# Patient Record
Sex: MALE | Race: WHITE | Employment: UNEMPLOYED | RURAL
[De-identification: names, ages, dates, MRNs, and addresses within clinical notes are randomized per-mention and may not be internally consistent; named-entity substitution may affect disease eponyms.]

---

## 2017-01-30 DIAGNOSIS — F32.A DEPRESSION DUE TO HEAD INJURY: ICD-10-CM

## 2017-01-30 DIAGNOSIS — S09.90XA DEPRESSION DUE TO HEAD INJURY: ICD-10-CM

## 2017-02-01 RX ORDER — FLUOXETINE 10 MG/1
10 CAPSULE ORAL DAILY
Qty: 30 CAP | Refills: 6 | Status: SHIPPED | OUTPATIENT
Start: 2017-02-01 | End: 2017-08-26 | Stop reason: SDUPTHER

## 2017-02-03 ENCOUNTER — OFFICE VISIT (OUTPATIENT)
Dept: FAMILY MEDICINE CLINIC | Age: 62
End: 2017-02-03

## 2017-02-03 VITALS
HEIGHT: 73 IN | SYSTOLIC BLOOD PRESSURE: 148 MMHG | OXYGEN SATURATION: 96 % | DIASTOLIC BLOOD PRESSURE: 96 MMHG | HEART RATE: 74 BPM | WEIGHT: 208 LBS | BODY MASS INDEX: 27.57 KG/M2 | RESPIRATION RATE: 20 BRPM | TEMPERATURE: 98 F
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
## 2017-02-03 DIAGNOSIS — Z87.820 HISTORY OF CLOSED HEAD INJURY: ICD-10-CM

## 2017-02-03 DIAGNOSIS — Z72.0 TOBACCO ABUSE: ICD-10-CM

## 2017-02-03 DIAGNOSIS — I10 ESSENTIAL HYPERTENSION: ICD-10-CM

## 2017-02-03 DIAGNOSIS — H93.13 TINNITUS, BILATERAL: ICD-10-CM

## 2017-02-03 DIAGNOSIS — T07.XXXA LACERATIONS OF MULTIPLE SITES WITHOUT COMPLICATION: ICD-10-CM

## 2017-02-03 DIAGNOSIS — R42 DIZZINESS: ICD-10-CM

## 2017-02-03 NOTE — PROGRESS NOTES
Progress Note    Patient: Bobbi . MRN: [id number]  SSN: [id number]    YOB: 1955  Age: 63 y.o. Sex: male        Chief Complaint   Patient presents with    Fall    Ringing in Ear         Subjective:   Fall:  Per the patient, he tripped and fell at the grocery store. States that he has hurt his hands from landing on the asphalt. Relates that he did not hit his head and did not lose conscious. States that he got dizzy and then fell. States that he is able to move all of his fingers and does not have any pain in his wrist.  Patient was found lying on the ground outside of [facility]. Syncope  Patient complains of near syncope. Onset was 10 years ago, with unchanged course since that time. Patient describes the episode as never actually lost consciousness, had precursor Sx only, including severe lightheadedness. Patient also has associated symptoms of ringing in his ears. The patient denies visual aura, headache, tachycardia/palpitations, melena, nausea, diarrhea, abdominal pain, excessive thirst. Taking culprit meds?: No suspect medications. Patient has a history of chronic dizziness with a hx of a traumatic head injury in the past.  Patient also has a history of ringing in his ears. Encounter Diagnoses   Name Primary?  Fall (on)(from) sidewalk curb, initial encounter Yes    Tinnitus, bilateral     Essential hypertension     Lacerations of multiple sites without complication     Tobacco abuse     History of closed head injury     Dizziness      Current and past medical information:    Current Medications after this visit[de-identified]     Current Outpatient Prescriptions   Medication Sig    FLUoxetine (PROZAC) 10 mg capsule Take 1 Cap by mouth daily.     carBAMazepine (TEGRETOL) 100 mg chewable tablet CHEW & SWALLOW 1 TABLET BY MOUTH 3 TIMES A DAY    metoprolol tartrate (LOPRESSOR) 25 mg tablet TAKE 1/2 TABLET BY MOUTH TWICE DAILY    pravastatin (PRAVACHOL) 10 mg tablet Take 1 Tab by mouth nightly.  losartan (COZAAR) 100 mg tablet TAKE ONE TABLET BY MOUTH DAILY    amitriptyline (ELAVIL) 25 mg tablet TAKE ONE TABLET BY MOUTH NIGHTLY    aspirin delayed-release 81 mg tablet Take  by mouth daily.  cetirizine (ZYRTEC) 10 mg tablet Take 1 Tab by mouth daily. No current facility-administered medications for this visit. Patient Active Problem List    Diagnosis Date Noted    Skull fracture ([facility] [address] 06/11/2014    Depression due to head injury 06/11/2014    Allergic arthritis involving hand 06/11/2014    Neurogenic pain 04/04/2014    Hearing loss of both ears 09/23/2013    Hyperlipidemia 02/19/2013    Back pain 07/19/2012    Chronic SI joint pain 07/19/2012    Chest pain, unspecified 04/17/2012    Hand pain 02/27/2012    Tobacco abuse 02/27/2012    HTN (hypertension) 02/27/2012    Tinnitus 02/27/2012       Past Medical History   Diagnosis Date    H/O seasonal allergies     Hypertension        No Known Allergies    No past surgical history on file. Social History     Social History    Marital status: SINGLE     Spouse name: N/A    Number of children: N/A    Years of education: N/A     Social History Main Topics    Smoking status: Current Every Day Smoker     Packs/day: 1.00     Types: Cigarettes    Smokeless tobacco: Never Used    Alcohol use No    Drug use: No    Sexual activity: Not Asked     Other Topics Concern    None     Social History Narrative       Review of Systems   Constitutional: Negative. Negative for chills, diaphoresis, fever, malaise/fatigue and weight loss. HENT: Positive for tinnitus (Chronic). Negative for congestion and sore throat. Eyes: Negative. Negative for blurred vision and double vision. Respiratory: Negative. Negative for cough, hemoptysis, sputum production, shortness of breath and wheezing. Cardiovascular: Negative. Negative for chest pain, palpitations, orthopnea, claudication, leg swelling and PND. Gastrointestinal: Negative. Negative for abdominal pain, blood in stool, constipation, diarrhea, heartburn, melena, nausea and vomiting. Genitourinary: Negative. Negative for dysuria, flank pain, frequency, hematuria and urgency. Musculoskeletal: Positive for falls. Negative for back pain, joint pain, myalgias and neck pain. Skin: Negative for itching and rash. Multiple skin tears on the bilateral hands. Neurological: Positive for dizziness (Chronic) and headaches (Chronic). Negative for tingling, tremors, sensory change, speech change, focal weakness, seizures, loss of consciousness and weakness. Endo/Heme/Allergies: Negative. Negative for environmental allergies and polydipsia. Does not bruise/bleed easily. Psychiatric/Behavioral: Negative. Negative for depression, hallucinations, memory loss, substance abuse and suicidal ideas. The patient is not nervous/anxious and does not have insomnia. Objective:     Vitals:    02/03/17 1234   BP: (!) 148/96   Pulse: 74   Resp: 20   Temp: 98 °F (36.7 °C)   TempSrc: Oral   SpO2: 96%   Weight: 208 lb (94.3 kg)   Height: 6' 1\" (1.854 m)      Body mass index is 27.44 kg/(m^2). Physical Exam   Constitutional: He is oriented to person, place, and time and well-developed, well-nourished, and in no distress. No distress. HENT:   Head: Normocephalic and atraumatic. Right Ear: External ear normal.   Left Ear: External ear normal.   Nose: Nose normal.   Mouth/Throat: Oropharynx is clear and moist. No oropharyngeal exudate. Eyes: Conjunctivae and EOM are normal. Pupils are equal, round, and reactive to light. Right eye exhibits no discharge. Left eye exhibits no discharge. No scleral icterus. Neck: Trachea normal, normal range of motion and full passive range of motion without pain. Neck supple. No JVD present. Carotid bruit is not present. No tracheal deviation present. No thyroid mass and no thyromegaly present.    Cardiovascular: Normal rate, regular rhythm, normal heart sounds and intact distal pulses. Exam reveals no gallop and no friction rub. No murmur heard. Pulmonary/Chest: Effort normal and breath sounds normal. No stridor. No respiratory distress. He has no wheezes. He has no rales. He exhibits no tenderness. Abdominal: Soft. Bowel sounds are normal. He exhibits no distension and no mass. There is no tenderness. There is no rebound and no guarding. Musculoskeletal: He exhibits no edema, tenderness or deformity. Right shoulder: Normal.        Left shoulder: Normal.        Right elbow: Normal.       Left elbow: Normal.        Right wrist: Normal.        Left wrist: Normal.        Right hip: Normal.        Left hip: Normal.        Right knee: Normal.        Left knee: Normal.        Right ankle: Normal.        Left ankle: Normal.        Cervical back: Normal.        Thoracic back: Normal.        Lumbar back: Normal.        Right forearm: Normal.        Left forearm: Normal.        Right hand: He exhibits decreased range of motion and laceration. He exhibits normal capillary refill. Normal sensation noted. Decreased sensation is not present in the ulnar distribution, is not present in the medial distribution and is not present in the radial distribution. Normal strength noted. Left hand: He exhibits decreased range of motion and laceration. He exhibits normal capillary refill, no deformity and no swelling. Normal sensation noted. Normal strength noted. Right upper leg: Normal.        Left upper leg: Normal.        Right lower leg: Normal.        Left lower leg: Normal.        Right foot: Normal.        Left foot: Normal.   Patient has multiple lacerations on the bilateral hands. On the palm there is noted to be a slightly deeper lac that dermabond was used to close. All lacerations were cleaned with 1/2 H2O2 and saline. Triple antibiotic ointment applied along with clean dressings.   Patient also needed a tetanus shot and that was given. Information on care and treatment given to the patient for review. Lymphadenopathy:     He has no cervical adenopathy. Neurological: He is alert and oriented to person, place, and time. He has normal reflexes. He displays normal reflexes. No cranial nerve deficit. He exhibits normal muscle tone. Gait normal. Coordination normal. GCS score is 15. Skin: Skin is warm and dry. No rash noted. He is not diaphoretic. No erythema. No pallor. Psychiatric: Memory and judgment normal. His mood appears anxious. He is agitated. He does not exhibit a depressed mood. He expresses no homicidal and no suicidal ideation. He expresses no suicidal plans and no homicidal plans. Patient is a very poor historian. Nursing note and vitals reviewed. Health Maintenance Due   Topic Date Due    Pneumococcal 19-64 Medium Risk (1 of 1 - PPSV23) 09/12/1976    FOBT Q 1 YEAR AGE 50-75  12/10/2015       Assessment and orders:       ICD-10-CM ICD-9-CM    1. Fall (on)(from) sidewalk curb, initial encounter W10. 1XXA E880.1 TETANUS, DIPHTHERIA TOXOIDS AND ACELLULAR PERTUSSIS VACCINE (TDAP), IN INDIVIDS. >=7, IM      CT IMMUNIZ ADMIN,1 SINGLE/COMB VAC/TOXOID   2. Tinnitus, bilateral H93.13 388.30 This is a chronic issue. Patient has had his hearing checked. Has been evaluated in the past. Mandy Joseph. tinnitus in the both ears is related to the hearing loss as documented on the audiogram.  The patient understands that there is no specific medical therapy for tinnitus, and that the best treatment is masking. Masking can be accomplished by environmental background noise such as a fan or radio. There are specific masking devices that can be prescribed and worn by the patient much like a hearing aid. Finally, a hearing aid itself can be quite effective in reducing the tinnitus and improving the associated loss of hearing. Tried to explain to the patient.   Unsure of how much that he is able to understand. 3. Essential hypertension I10 401.9 Self management goals of living with hypertension include:   A. Taking medicine as prescribed,  B. Monitoring blood pressure response to therapy  C. Adopting lifestyle recommendations--increasing exercise, decreasing salt intake, and increasing fruits and vegetable consumption. Our goal is to normalize the blood pressure to decrease the risks of strokes and heart attacks. The patient is in agreement with the plan. Information printed and given to the patient for review. 4. Lacerations of multiple sites without complication F54 110.3    5. Tobacco abuse Z72.0 305.1 Discussion with patient about the global effects of smoking on their health. Is not ready to stop at this visit. Information printed for patient to review. Urged strongly to quit. Verbalized understanding. 6. History of closed head injury Z87.820 V15.52 REFERRAL TO NEUROLOGY   7. Dizziness R42 780.4 AMB POC EKG ROUTINE W/ 12 LEADS, INTER & REP      REFERRAL TO NEUROLOGY      REFERRAL TO CARDIOLOGY         Plan of care:  Discussed diagnoses in detail with patient. Medication risks/benefits/side effects discussed with patient. All of the patient's questions were addressed. The patient understands and agrees with our plan of care. The patient knows to call back if they are unsure of or forget any changes we discussed today or if the symptoms change. The patient received an After-Visit Summary which contains VS, orders, medication list and allergy list. This can be used as a \"mini-medical record\" should they have to seek medical care while out of town.     Patient Care Team:  Holli Mobley MD as PCP - General (Family Practice)  Jules Mullins MD (Rheumatology)  Simona Gann MD (Otolaryngology)  Radha Rivers MD (Cardiology)  Darian Patino MD (Neurology)    Follow-up Disposition: Not on File    Future Appointments  Date Time Provider Vanessa Gutierrez   4/11/2017 9:00 AM Aftab Rainey MD [facility] SCHED       Signed By: Georgette Lozano NP     February 15, 2017

## 2017-02-08 ENCOUNTER — OFFICE VISIT (OUTPATIENT)
Dept: CARDIOLOGY CLINIC | Age: 62
End: 2017-02-08

## 2017-02-08 VITALS
RESPIRATION RATE: 20 BRPM | SYSTOLIC BLOOD PRESSURE: 116 MMHG | OXYGEN SATURATION: 96 % | HEART RATE: 62 BPM | TEMPERATURE: 97.9 F | HEIGHT: 73 IN | BODY MASS INDEX: 27.17 KG/M2 | WEIGHT: 205 LBS | DIASTOLIC BLOOD PRESSURE: 79 MMHG

## 2017-02-08 DIAGNOSIS — S02.91XS CLOSED FRACTURE OF SKULL, UNSPECIFIED BONE, SEQUELA (HCC): ICD-10-CM

## 2017-02-08 DIAGNOSIS — E78.5 HYPERLIPIDEMIA, UNSPECIFIED HYPERLIPIDEMIA TYPE: ICD-10-CM

## 2017-02-08 DIAGNOSIS — R55 SYNCOPE AND COLLAPSE: Primary | ICD-10-CM

## 2017-02-08 DIAGNOSIS — I10 ESSENTIAL HYPERTENSION: ICD-10-CM

## 2017-02-08 NOTE — PROGRESS NOTES
Twin Arora.      1955   Richie Dodd MD  Date of Visit-2/8/2017        [facility],  PCP=Keyon Tello MD     [facility] [address] [place] [address] [facility] [place]. HPI:   Merlin Lineman. is a 63 y.o. male   Subjective:  Mr. Vicenta Clark says that he suffered an injury in some sort of mechanical accident in 28 and since then has had occasional falls and dizziness, getting worse of late. He said he saw a neurologist and had an extensive workup, including a CT scan and was told there was nothing acute found. He says the last episode happened Friday, he fell while walking on a curb, lost his balance and fell. He also had an episode where he fell a month ago behind a building and does not recall. He feels dizzy kind of chronically for many, many years. He has no known cardiac disease. His history includes just essential hypertension and seasonal allergies along with his traumatic injury. He had an echocardiogram done in 2012 which was normal at 60%. He had a stress test 04/18/12 with no ischemia and EF of 65%. He had an EKG on 02/03/17. It was ordered, but I do not see an actual EKG report. Imaging wise he's had CT of the head 09/17/14, which showed no acute abnormality with a healed left temporal bone fracture and focal right temporal lobe encephalomalacia. He does not recall the name of the neurologist that he saw. He generally runs a normal blood pressure. Has kept a blood pressure diary at home, 111-138, with a pulse in the 50s. His blood pressure was elevated at his last visit after the fall, but generally runs pretty well. He was hospitalized in 2012 with chest pain. He has had abnormal troponins and  Metoprolol. Physical Examination:  He has a normal cardiac exam.      Assessment/Plans:  1. Syncope and collapse    2. Essential hypertension    3. Hyperlipidemia, unspecified hyperlipidemia type    4.  Closed fracture of skull, unspecified bone, sequela (HCC)         Impression/Plan:  I am not sure his dizziness relates to any cardiac arrhythmia or change in LV function. The question is whether this relates to his primary neurological injury or perhaps blood pressure. His blood pressure actually runs pretty normal to low. What I would do is cut back or even stop the Cozaar. Told him to cut it in half. I do not know that an echo or stress test is going to be helpful here or even a monitor. Let's have him reduce the Cozaar and see how he does with this. He has follow up as below. We can arrange for the follow up if there is no scheduledfollow up. If he's not improved, however, then pursue workup with echo, etc.  Future Appointments  Date Time Provider Vanessa Gutierrez   4/11/2017 9:00 AM Bren Berman MD [facility]       ROS:Cardiac complete  as above. Respiratory as above with no wheezing or hemoptysis. He denies  symptoms of unusual weight loss , fevers,  BRBPR, hematuria,  or recent stroke    Past Medical History   Diagnosis Date    H/O seasonal allergies     Hypertension       Exam and Labs:  Visit Vitals    /79 (BP 1 Location: Left arm, BP Patient Position: Sitting)    Pulse 62    Temp 97.9 °F (36.6 °C) (Oral)    Resp 20    Ht 6' 1\" (1.854 m)    Wt 205 lb (93 kg)    SpO2 96%    BMI 27.05 kg/m2     Constitutional:  NAD, comfortable , moist mucous membranesHENT: Head: NC,ATEyes: No scleral icterus. Neck:  Neck supple. No JVD present. No tracheal deviation,mass  Chest: Effort normal & normal respiratory excursion     Lungs:breath sounds normal. No stridor. distress, wheezes or  Rales. Heart:normal rate, regular rhythm, normal S1, S2, no murmurs, rubs, clicks or gallops , PMI non displaced. Edema: Edema is none. Extremities:  no clubbing or cyanosis. Abdominal:  no abnormal distension. Neurological: alert, conversant and oriented . Skin: Skin is not cold. No obvious systemic rash noted.  Not diaphoretic. No erythema. Psychiatric:  Grossly normal mood and affect. Behavior appears normal.     Lab Results   Component Value Date/Time    Cholesterol, total 180 08/25/2016 10:20 AM    HDL Cholesterol 56 08/25/2016 10:20 AM    LDL, calculated 102 08/25/2016 10:20 AM    Triglyceride 111 08/25/2016 10:20 AM     Lab Results   Component Value Date/Time    Sodium 140 08/25/2016 10:20 AM    Potassium 5.6 08/25/2016 10:20 AM    Chloride 100 08/25/2016 10:20 AM    CO2 24 08/25/2016 10:20 AM    Anion gap 6 04/18/2012 01:00 AM    Glucose 91 08/25/2016 10:20 AM    BUN 20 08/25/2016 10:20 AM    Creatinine 1.13 08/25/2016 10:20 AM    BUN/Creatinine ratio 18 08/25/2016 10:20 AM    GFR est AA 82 08/25/2016 10:20 AM    GFR est non-AA 71 08/25/2016 10:20 AM    Calcium 9.0 08/25/2016 10:20 AM      Wt Readings from Last 3 Encounters:   02/08/17 205 lb (93 kg)   02/03/17 208 lb (94.3 kg)   11/28/16 208 lb (94.3 kg)    BP Readings from Last 3 Encounters:   02/08/17 116/79   02/03/17 (!) 148/96   11/28/16 139/89        Current Outpatient Prescriptions   Medication Sig    FLUoxetine (PROZAC) 10 mg capsule Take 1 Cap by mouth daily.  carBAMazepine (TEGRETOL) 100 mg chewable tablet CHEW & SWALLOW 1 TABLET BY MOUTH 3 TIMES A DAY    metoprolol tartrate (LOPRESSOR) 25 mg tablet TAKE 1/2 TABLET BY MOUTH TWICE DAILY    pravastatin (PRAVACHOL) 10 mg tablet Take 1 Tab by mouth nightly.  losartan (COZAAR) 100 mg tablet TAKE ONE TABLET BY MOUTH DAILY    aspirin delayed-release 81 mg tablet Take  by mouth daily.  cetirizine (ZYRTEC) 10 mg tablet Take 1 Tab by mouth daily.  amitriptyline (ELAVIL) 25 mg tablet TAKE ONE TABLET BY MOUTH NIGHTLY     No current facility-administered medications for this visit. History reviewed. No pertinent past surgical history. Social Hx=  reports that he has been smoking Cigarettes. He has been smoking about 1.00 pack per day.  He has never used smokeless tobacco. He reports that he does not drink alcohol or use illicit drugs. Family Hx= Family history is unknown by patient. Impression see above.

## 2017-02-08 NOTE — PROGRESS NOTES
Reviewed record in preparation for visit and have necessary documentation  Pt did not bring medication to office visit for review  Information was given to pt on Advanced Directives, Living Will  opportunity was given for questions  Goals that were addressed and/or need to be completed during or after this appointment include   Health Maintenance Due   Topic Date Due    Pneumococcal 19-64 Medium Risk (1 of 1 - PPSV23) 09/12/1976    FOBT Q 1 YEAR AGE 50-75  12/10/2015

## 2017-02-08 NOTE — MR AVS SNAPSHOT
Visit Information Date & Time Provider Department Dept. Phone Encounter #  
 2/8/2017 10:40 AM Giovanni Desai MD [facility] [phone] [id number] Your Appointments 4/11/2017  9:00 AM  
New Patient with Eun Islas MD  
Neurology [address], [id number] [facility]) Appt Note: dizziness [address] [address]  
[phone]  
  
   
 [address] [address] Upcoming Health Maintenance Date Due Pneumococcal 19-64 Medium Risk (1 of 1 - PPSV23) 9/12/1976 FOBT Q 1 YEAR AGE 50-75 12/10/2015 DTaP/Tdap/Td series (2 - Td) 2/3/2027 Allergies as of 2/8/2017  Review Complete On: 2/8/2017 By: Renae Tao LPN No Known Allergies Current Immunizations  Reviewed on 7/19/2012 Name Date Influenza Vaccine 9/6/2013 Influenza Vaccine (Quad) PF 11/28/2016, 11/17/2015, 11/25/2014 Tdap 2/3/2017 Not reviewed this visit Vitals BP Pulse Temp Resp Height(growth percentile) Weight(growth percentile) 116/79 (BP 1 Location: Left arm, BP Patient Position: Sitting) 62 97.9 °F (36.6 °C) (Oral) 20 6' 1\" (1.854 m) 205 lb (93 kg) SpO2 BMI Smoking Status 96% 27.05 kg/m2 Current Every Day Smoker Vitals History BMI and BSA Data Body Mass Index Body Surface Area  
 27.05 kg/m 2 2.19 m 2 Preferred Pharmacy Pharm[facility] Name Phone [address] [address] [phone] Your Updated Medication List  
  
   
This list is accurate as of: 2/8/17 11:50 AM.  Always use your most recent med list.  
  
  
  
  
 amitriptyline 25 mg tablet Commonly known as:  ELAVIL TAKE ONE TABLET BY MOUTH NIGHTLY  
  
 aspirin delayed-release 81 mg tablet Take  by mouth daily. carBAMazepine 100 mg chewable tablet Commonly known as:  TEGretol CHEW & SWALLOW 1 TABLET BY MOUTH 3 TIMES A DAY  
  
 cetirizine 10 mg tablet Commonly known as:  ZYRTEC Take 1 Tab by mouth daily. FLUoxetine 10 mg capsule Commonly known as:  PROzac Take 1 Cap by mouth daily. losartan 100 mg tablet Commonly known as:  COZAAR  
TAKE ONE TABLET BY MOUTH DAILY  
  
 metoprolol tartrate 25 mg tablet Commonly known as:  LOPRESSOR  
TAKE 1/2 TABLET BY MOUTH TWICE DAILY pravastatin 10 mg tablet Commonly known as:  PRAVACHOL Take 1 Tab by mouth nightly. Patient Instructions Reduce your Cozaar to a half tablet daily Please provide this summary of care documentation to your next provider. Your primary care clinician is listed as Orpha Lines. If you have any questions after today's visit, please call [phone].

## 2017-02-15 NOTE — PATIENT INSTRUCTIONS
Scrapes (Abrasions) in Teens: Care Instructions  Your Care Instructions  Scrapes (abrasions) are wounds where your skin has been rubbed or torn off. Most scrapes do not go deep into the skin, but some may remove several layers of skin. Scrapes usually don't bleed much, but they may ooze pinkish fluid. Scrapes on the head or face may appear worse than they are. They may bleed a lot because of the good blood supply to this area. Most scrapes heal well and may not need a bandage. They usually heal within 3 to 7 days. A large, deep scrape may take 1 to 2 weeks or longer to heal. A scab may form on some scrapes. Follow-up care is a key part of your treatment and safety. Be sure to make and go to all appointments, and call your doctor if you are having problems. It's also a good idea to know your test results and keep a list of the medicines you take. How can you care for yourself at home? · If your doctor told you how to care for your wound, follow your doctor's instructions. If you did not get instructions, follow this general advice:  ¨ Wash the scrape with clean water 2 times a day. Don't use hydrogen peroxide or alcohol, which can slow healing. ¨ You may cover the scrape with a thin layer of petroleum jelly, such as Vaseline, and a nonstick bandage. ¨ Apply more petroleum jelly and replace the bandage as needed. · Prop up the injured area on a pillow anytime you sit or lie down during the next 3 days. Try to keep it above the level of your heart. This will help reduce swelling. · Be safe with medicines. Take pain medicines exactly as directed. ¨ If the doctor gave you a prescription medicine for pain, take it as prescribed. ¨ If you are not taking a prescription pain medicine, ask your doctor if you can take an over-the-counter medicine. When should you call for help?   Call your doctor now or seek immediate medical care if:  · You have signs of infection, such as:  ¨ Increased pain, swelling, warmth, or redness around the scrape. ¨ Red streaks leading from the scrape. ¨ Pus draining from the scrape. ¨ A fever. · The scrape starts to bleed, and blood soaks through the bandage. Oozing small amounts of blood is normal.  Watch closely for changes in your health, and be sure to contact your doctor if the scrape is not getting better each day. Where can you learn more? Go to http://jonatan-donna.info/. Enter U944 in the search box to learn more about \"Scrapes (Abrasions) in Teens: Care Instructions. \"  Current as of: May 27, 2016  Content Version: 11.1  © 1914-2550 [employer]. Care instructions adapted under license by [email or web address] (which disclaims liability or warranty for this information). If you have questions about a medical condition or this instruction, always ask your healthcare professional. [place] [address] any warranty or liability for your use of this information. Dizziness: Care Instructions  Your Care Instructions  Dizziness is the feeling of unsteadiness or fuzziness in your head. It is different than having vertigo, which is a feeling that the room is spinning or that you are moving or falling. It is also different from lightheadedness, which is the feeling that you are about to faint. It can be hard to know what causes dizziness. Some people feel dizzy when they have migraine headaches. Sometimes bouts of flu can make you feel dizzy. Some medical conditions, such as heart problems or high blood pressure, can make you feel dizzy. Many medicines can cause dizziness, including medicines for high blood pressure, pain, or anxiety. If a medicine causes your symptoms, your doctor may recommend that you stop or change the medicine. If it is a problem with your heart, you may need medicine to help your heart work better.  If there is no clear reason for your symptoms, your doctor may suggest watching and waiting for a while to see if the dizziness goes away on its own. Follow-up care is a key part of your treatment and safety. Be sure to make and go to all appointments, and call your doctor if you are having problems. It's also a good idea to know your test results and keep a list of the medicines you take. How can you care for yourself at home? · If your doctor recommends or prescribes medicine, take it exactly as directed. Call your doctor if you think you are having a problem with your medicine. · Do not drive while you feel dizzy. · Try to prevent falls. Steps you can take include:  ¨ Using nonskid mats, adding grab bars near the tub, and using night-lights. ¨ Clearing your home so that walkways are free of anything you might trip on. ¨ Letting family and friends know that you have been feeling dizzy. This will help them know how to help you. When should you call for help? Call 911 anytime you think you may need emergency care. For example, call if:  · You passed out (lost consciousness). · You have dizziness along with symptoms of a heart attack. These may include:  ¨ Chest pain or pressure, or a strange feeling in the chest.  ¨ Sweating. ¨ Shortness of breath. ¨ Nausea or vomiting. ¨ Pain, pressure, or a strange feeling in the back, neck, jaw, or upper belly or in one or both shoulders or arms. ¨ Lightheadedness or sudden weakness. ¨ A fast or irregular heartbeat. · You have symptoms of a stroke. These may include:  ¨ Sudden numbness, tingling, weakness, or loss of movement in your face, arm, or leg, especially on only one side of your body. ¨ Sudden vision changes. ¨ Sudden trouble speaking. ¨ Sudden confusion or trouble understanding simple statements. ¨ Sudden problems with walking or balance. ¨ A sudden, severe headache that is different from past headaches. Call your doctor now or seek immediate medical care if:  · You feel dizzy and have a fever, headache, or ringing in your ears.   · You have new or increased nausea and vomiting. · Your dizziness does not go away or comes back. Watch closely for changes in your health, and be sure to contact your doctor if:  · You do not get better as expected. Where can you learn more? Go to http://jonatan-donna.info/. Enter V205 in the search box to learn more about \"Dizziness: Care Instructions. \"  Current as of: May 27, 2016  Content Version: 11.1  © 5101-2751 [employer] Care instructions adapted under license by [employer] (which disclaims liability or warranty for this information). If you have questions about a medical condition or this instruction, always ask your healthcare professional. [address] any warranty or liability for your use of this information. Lightheadedness or Faintness: Care Instructions  Your Care Instructions  Lightheadedness is a feeling that you are about to faint or \"pass out. \" You do not feel as if you or your surroundings are moving. It is different from vertigo, which is the feeling that you or things around you are spinning or tilting. Lightheadedness usually goes away or gets better when you lie down. If lightheadedness gets worse, it can lead to a fainting spell. It is common to feel lightheaded from time to time. Lightheadedness usually is not caused by a serious problem. It often is caused by a short-lasting drop in blood pressure and blood flow to your head that occurs when you get up too quickly from a seated or lying position. Follow-up care is a key part of your treatment and safety. Be sure to make and go to all appointments, and call your doctor if you are having problems. It's also a good idea to know your test results and keep a list of the medicines you take. How can you care for yourself at home? · Lie down for 1 or 2 minutes when you feel lightheaded. After lying down, sit up slowly and remain sitting for 1 to 2 minutes before slowly standing up.   · Avoid movements, positions, or activities that have made you lightheaded in the past.  · Get plenty of rest, especially if you have a cold or flu, which can cause lightheadedness. · Make sure you drink plenty of fluids, especially if you have a fever or have been sweating. · Do not drive or put yourself and others in danger while you feel lightheaded. When should you call for help? Call 911 anytime you think you may need emergency care. For example, call if:  · You have symptoms of a stroke. These may include:  ¨ Sudden numbness, tingling, weakness, or loss of movement in your face, arm, or leg, especially on only one side of your body. ¨ Sudden vision changes. ¨ Sudden trouble speaking. ¨ Sudden confusion or trouble understanding simple statements. ¨ Sudden problems with walking or balance. ¨ A sudden, severe headache that is different from past headaches. · You have symptoms of a heart attack. These may include:  ¨ Chest pain or pressure, or a strange feeling in the chest.  ¨ Sweating. ¨ Shortness of breath. ¨ Nausea or vomiting. ¨ Pain, pressure, or a strange feeling in the back, neck, jaw, or upper belly or in one or both shoulders or arms. ¨ Lightheadedness or sudden weakness. ¨ A fast or irregular heartbeat. After you call 911, the  may tell you to chew 1 adult-strength or 2 to 4 low-dose aspirin. Wait for an ambulance. Do not try to drive yourself. Watch closely for changes in your health, and be sure to contact your doctor if:  · Your lightheadedness gets worse or does not get better with home care. Where can you learn more? Go to http://jonatan-donna.info/. Enter R873 in the search box to learn more about \"Lightheadedness or Faintness: Care Instructions. \"  Current as of: May 27, 2016  Content Version: 11.1  © 2361-3579 [employer].  Care instructions adapted under license by [employer] (which disclaims liability or warranty for this information). If you have questions about a medical condition or this instruction, always ask your healthcare professional. [address] any warranty or liability for your use of this information. Cuts Closed With Adhesives: Care Instructions  Your Care Instructions  A cut can happen anywhere on your body. The doctor used an adhesive to close the cut. When the adhesive dries, it forms a film that holds the edges of the cut together. Skin adhesives are sometimes called liquid stitches. If the cut went deep and through the skin, the doctor may have put in a layer of stitches below the adhesive. The deeper layer of stitches brings the deep part of the cut together. These stitches will dissolve and don't need to be removed. You don't see the stitches, only the adhesive. You may have a bandage. The doctor has checked you carefully, but problems can develop later. If you notice any problems or new symptoms, get medical treatment right away. Follow-up care is a key part of your treatment and safety. Be sure to make and go to all appointments, and call your doctor if you are having problems. It's also a good idea to know your test results and keep a list of the medicines you take. How can you care for yourself at home? · Keep the cut dry for the first 24 to 48 hours. After this, you can shower if your doctor okays it. Pat the cut dry. · Don't soak the cut, such as in a bathtub. Your doctor will tell you when it's safe to get the cut wet. · If your doctor told you how to care for your cut, follow your doctor's instructions. If you did not get instructions, follow this general advice:  ¨ Do not put any kind of ointment, cream, or lotion over the area. This can make the adhesive fall off too soon. ¨ After the first 24 to 48 hours, wash around the cut with clean water 2 times a day. Do not use hydrogen peroxide or alcohol, which can slow healing.   ¨ If the doctor told you to use a bandage, put on a new bandage after cleaning the cut or if the bandage gets wet or dirty. · Prop up the sore area on a pillow anytime you sit or lie down during the next 3 days. Try to keep it above the level of your heart. This will help reduce swelling. · Leave the skin adhesive on your skin until it falls off on its own. This may take 5 to 10 days. · Do not scratch, rub, or pick at the adhesive. · Do not put the sticky part of a bandage directly on the adhesive. · Avoid any activity that could cause your cut to reopen. · Be safe with medicines. Read and follow all instructions on the label. ¨ If the doctor gave you a prescription medicine for pain, take it as prescribed. ¨ If you are not taking a prescription pain medicine, ask your doctor if you can take an over-the-counter medicine. When should you call for help? Call your doctor now or seek immediate medical care if:  · You have new pain, or your pain gets worse. · The skin near the cut is cold or pale or changes color. · You have tingling, weakness, or numbness near the cut. · The cut starts to bleed. · You have trouble moving the area near the cut. · You have symptoms of infection, such as:  ¨ Increased pain, swelling, warmth, or redness around the cut. ¨ Red streaks leading from the cut. ¨ Pus draining from the cut. ¨ A fever. Watch closely for changes in your health, and be sure to contact your doctor if:  · The cut reopens. · You do not get better as expected. Where can you learn more? Go to http://jonatan-donna.info/. Enter P174 in the search box to learn more about \"Cuts Closed With Adhesives: Care Instructions. \"  Current as of: May 27, 2016  Content Version: 11.1  © 5108-6852 [employer]. Care instructions adapted under license by [employer] (which disclaims liability or warranty for this information).  If you have questions about a medical condition or this instruction, always ask your healthcare professional. [address] any warranty or liability for your use of this information. Wound Care: After Your Visit  Your Care Instructions  Taking good care of your wound at home will help it heal quickly and reduce your chance of infection. The doctor has checked you carefully, but problems can develop later. If you notice any problems or new symptoms, get medical treatment right away. Follow-up care is a key part of your treatment and safety. Be sure to make and go to all appointments, and call your doctor if you are having problems. It's also a good idea to know your test results and keep a list of the medicines you take. How can you care for yourself at home? · Clean the area with soap and water 2 times a day unless your doctor gives you different instructions. Don't use hydrogen peroxide or alcohol, which can slow healing. ¨ You may cover the wound with a thin layer of antibiotic ointment, such as bacitracin, and a nonstick bandage. ¨ Apply more ointment and replace the bandage as needed. · Take pain medicines exactly as directed. Some pain is normal with a wound, but do not ignore pain that is getting worse instead of better. You could have an infection. ¨ If the doctor gave you a prescription medicine for pain, take it as prescribed. ¨ If you are not taking a prescription pain medicine, ask your doctor if you can take an over-the-counter medicine. · Your doctor may have closed your wound with stitches (sutures), staples, or skin glue. ¨ If you have stitches, your doctor may remove them after several days to 2 weeks. Or you may have stitches that dissolve on their own. ¨ If you have staples, your doctor may remove them after 7 to 10 days. ¨ If your wound was closed with skin glue, the glue will wear off in a few days to 2 weeks. When should you call for help?   Call your doctor now or seek immediate medical care if:  · You have signs of infection, such as:  ¨ Increased pain, swelling, warmth, or redness near the wound. ¨ Red streaks leading from the wound. ¨ Pus draining from the wound. ¨ A fever. · You bleed so much from your incision that you soak one or more bandages over 2 to 4 hours. Watch closely for changes in your health, and be sure to contact your doctor if:  · The wound is not getting better each day. Where can you learn more? Go to [employer]  Enter M973 in the search box to learn more about \"Wound Care: After Your Visit. \"   © 4100-6247 [employer]. Care instructions adapted under license by [employer] (which disclaims liability or warranty for this information). This care instruction is for use with your licensed healthcare professional. If you have questions about a medical condition or this instruction, always ask your healthcare professional. [address] any warranty or liability for your use of this information. Content Version: 53.5.878356;  Last Revised: April 23, 2012

## 2017-04-10 DIAGNOSIS — R56.9 CONVULSIONS, UNSPECIFIED CONVULSION TYPE (HCC): ICD-10-CM

## 2017-04-11 ENCOUNTER — OFFICE VISIT (OUTPATIENT)
Dept: NEUROLOGY | Age: 62
End: 2017-04-11

## 2017-04-11 VITALS
BODY MASS INDEX: 27.17 KG/M2 | SYSTOLIC BLOOD PRESSURE: 128 MMHG | DIASTOLIC BLOOD PRESSURE: 88 MMHG | WEIGHT: 205 LBS | HEIGHT: 73 IN | HEART RATE: 55 BPM | OXYGEN SATURATION: 99 %

## 2017-04-11 DIAGNOSIS — G40.219 PARTIAL SYMPTOMATIC EPILEPSY WITH COMPLEX PARTIAL SEIZURES, INTRACTABLE, WITHOUT STATUS EPILEPTICUS (HCC): ICD-10-CM

## 2017-04-11 DIAGNOSIS — S06.9X9S TBI (TRAUMATIC BRAIN INJURY), WITH LOC OF UNSPECIFIED DURATION, SEQUELA: Primary | ICD-10-CM

## 2017-04-11 NOTE — MR AVS SNAPSHOT
Visit Information Date & Time Provider Department Dept. Phone Encounter #  
 4/11/2017  9:00 AM Ivan Chong MD Neurology [facility],  [id number] [phone] [id number] Follow-up Instructions Return in about 3 months (around 7/11/2017). Upcoming Health Maintenance Date Due Pneumococcal 19-64 Medium Risk (1 of 1 - PPSV23) 9/12/1976 FOBT Q 1 YEAR AGE 50-75 12/10/2015 DTaP/Tdap/Td series (2 - Td) 2/3/2027 Allergies as of 4/11/2017  Review Complete On: 4/11/2017 By: Ivan Chong MD  
 No Known Allergies Current Immunizations  Reviewed on 7/19/2012 Name Date Influenza Vaccine 9/6/2013 Influenza Vaccine (Quad) PF 11/28/2016, 11/17/2015, 11/25/2014 Tdap 2/3/2017 Not reviewed this visit You Were Diagnosed With   
  
 Codes Comments TBI (traumatic brain injury), with LOC of unspecified duration, sequela    -  Primary ICD-10-CM: Q51.4Q6O 
ICD-9-CM: 907.0 Partial symptomatic epilepsy with complex partial seizures, intractable, without status epilepticus ([facility]     ICD-10-CM: O26.987 ICD-9-CM: 345.41 Vitals BP Pulse Height(growth percentile) Weight(growth percentile) SpO2 BMI  
 128/88 (BP 1 Location: Left arm, BP Patient Position: Sitting) (!) 55 6' 1\" (1.854 m) 205 lb (93 kg) 99% 27.05 kg/m2 Smoking Status Current Every Day Smoker Vitals History BMI and BSA Data Body Mass Index Body Surface Area  
 27.05 kg/m 2 2.19 m 2 Preferred Pharmacy Pharmacy Name Phone [address] [place], [place] - [phone] Your Updated Medication List  
  
   
This list is accurate as of: 4/11/17 10:23 AM.  Always use your most recent med list.  
  
  
  
  
 aspirin delayed-release 81 mg tablet Take  by mouth daily. carBAMazepine 100 mg chewable tablet Commonly known as:  TEGretol CHEW & SWALLOW 1 TABLET BY MOUTH 3 TIMES A DAY  
  
 cetirizine 10 mg tablet Commonly known as:  ZYRTEC Take 1 Tab by mouth daily. FLUoxetine 10 mg capsule Commonly known as:  PROzac Take 1 Cap by mouth daily. losartan 100 mg tablet Commonly known as:  COZAAR  
TAKE ONE TABLET BY MOUTH DAILY  
  
 metoprolol tartrate 25 mg tablet Commonly known as:  LOPRESSOR  
TAKE 1/2 TABLET BY MOUTH TWICE DAILY pravastatin 10 mg tablet Commonly known as:  PRAVACHOL  
TAKE ONE TABLET BY MOUTH NIGHTLY We Performed the Following REFERRAL TO PHYSICAL THERAPY [EDQ55 Custom] Comments:  
 L sided heimplegia; TBI; Gait and balance training; L sided strengthening Follow-up Instructions Return in about 3 months (around 7/11/2017). Referral Information Referral ID Referred By Referred To  
  
 [id number] MJ Schwartz Not Available Visits Status Start Date End Date 1 New Request 4/11/17 4/11/18 If your referral has a status of pending review or denied, additional information will be sent to support the outcome of this decision. Patient Instructions Traumatic Brain Injury, Long-Term Healing: Care Instructions Your Care Instructions A traumatic brain injury (TBI) means the brain has been bruised, swollen, or torn. This can be caused by a blow to the head or body, a fall, or another injury that jars or shakes the brain. It will take time for you to get better. You may worry about how you are feeling. This is normal. TBIs often have long-term effects. These include: · Not thinking clearly, or having trouble remembering new information. · Having headaches, vision problems, or dizziness. · Feeling sad or nervous. · Getting angry easily. · Sleeping more or less than usual. 
No one will be able to tell you for sure how long the symptoms will last. But there are things you can do to help yourself get better.  
You may need another person to watch you closely to make sure that your symptoms aren't getting worse. Follow your doctor's instructions about how long you need someone to stay with you. Follow-up care is a key part of your treatment and safety. Be sure to make and go to all appointments, and call your doctor if you are having problems. It's also a good idea to know your test results and keep a list of the medicines you take. How can you care for yourself at home? What you and your doctors can do Different types of therapy and support may used to help you recover from a TBI. Follow the plan your doctor suggests. This may include: · Physical and occupational therapy. These help you return to daily activities and live as independently as possible. · Speech and language therapy. You may need help understanding and producing language. Speech and language therapists also help you organize daily tasks and develop problem-solving methods. · Counseling. This can help you understand your thoughts and learn ways to cope with your feelings. Counseling can help you feel more in control. It can help get you back to your life's activities. · Social support and support groups. It's important that you get the chance to talk with people who are going through the same things you are. Your family or friends may be able to help you get treatment and deal with your symptoms. · Medicines. These may help relieve symptoms like sleep problems, chronic pain, and headaches. Medicine can also help if you have anxiety, depression, or memory problems. Talk with your doctor about what medicines might be best for you. Also ask which medicines you should not take. What you can do Here are some ways you can help yourself: · Get plenty of sleep, and take it easy during the day. Rest is the best way to recover. · Don't drink alcohol or use illegal drugs. · Don't drive a car, ride a bike, or operate machinery until your doctor says it's okay. · Avoid activities that are physically or mentally demanding. These include housework, exercise, schoolwork, video games, text messaging, or using the computer. You may need to change your school or work schedule for a while. · If you feel grumpy or irritable, get away from whatever is bothering you. When should you call for help? Watch closely for changes in your health, and be sure to contact your doctor if: 
· Your symptoms get worse. These include headaches, trouble concentrating, or changes in your mood. · You have been feeling sad, depressed, or hopeless, or have lost interest in things you usually enjoy. · You do not get better as expected. Where can you learn more? Go to http://jonatan-donna.info/. Enter P943 in the search box to learn more about \"Traumatic Brain Injury, Long-Term Healing: Care Instructions. \" Current as of: October 14, 2016 Content Version: 11.2 © 2839-4793 [employer]aker. Care instructions adapted under license by [employer] (which disclaims liability or warranty for this information). If you have questions about a medical condition or this instruction, always ask your healthcare professional. [place] [address] any warranty or liability for your use of this information. Epilepsy: Care Instructions Your Care Instructions Epilepsy is a common condition that causes repeated seizures. The seizures are caused by bursts of electrical activity in the brain that aren't normal. Seizures may cause problems with muscle control, movement, speech, vision, or awareness. They can be scary. Epilepsy affects each person differently. Some people have only a few seizures. Others get them more often. If you know what triggers a seizure, you may be able to avoid having one. You can take medicines to control and reduce seizures. You and your doctor will need to find the right combination, schedule, and dose of medicine. This may take time and careful changes. Seizures may get worse and happen more often over time. Follow-up care is a key part of your treatment and safety. Be sure to make and go to all appointments, and call your doctor if you are having problems. It's also a good idea to know your test results and keep a list of the medicines you take. How can you care for yourself at home? · Be safe with medicines. Take your medicines exactly as prescribed. Call your doctor if you think you are having a problem with your medicine. · Make a treatment plan with your doctor. Be sure to follow your plan. · Try to identify and avoid things that may make you more likely to have a seizure. These may include: ¨ Not getting enough sleep. ¨ Using drugs or alcohol. ¨ Being emotionally stressed. ¨ Skipping meals. · Keep a record of any seizures you have. Note the date, time of day, and any details about the seizure that you can remember. Your doctor can use this information to plan or adjust your medicine or other treatment. · Be sure that any doctor treating you for another condition knows that you have epilepsy. Each doctor should know what medicines you are taking, if any. · Wear a medical ID bracelet. You can buy this at most [employer]. If you have a seizure that leaves you unconscious or unable to speak for yourself, this bracelet will let those who are treating you know that you have epilepsy. · Talk to your doctor about whether it is safe for you to do certain activities, such as drive or swim. When should you call for help? Call 911 anytime you think you may need emergency care. For example, call if: · A seizure does not stop as it normally does. · You have new symptoms such as: 
¨ Numbness, tingling, or weakness on one side of your body or face. ¨ Vision changes. ¨ Trouble speaking or thinking clearly. Call your doctor now or seek immediate medical care if: 
· You have a fever. · You have a severe headache. Watch closely for changes in your health, and be sure to contact your doctor if: · The normal pattern or features of your seizures change. Where can you learn more? Go to http://jonatan-donan.info/. Bridgett Velasquez in the search box to learn more about \"Epilepsy: Care Instructions. \" Current as of: October 14, 2016 Content Version: 11.2 © 3425-7702 [employer]ing[employer]est. Care instructions adapted under license by [employer] (which disclaims liability or warranty for this information). If you have questions about a medical condition or this instruction, always ask your healthcare professional. [address] any warranty or liability for your use of this information. Please provide this summary of care documentation to your next provider. Your primary care clinician is listed as Ozzie Dumont. If you have any questions after today's visit, please call [phone].

## 2017-04-11 NOTE — LETTER
4/11/2017 12:48 PM 
 
Patient:  Ever Kimball. YOB: 1955 Date of Visit: 4/11/2017 Dear Lydia Junior MD 
[address] [place] [id number] VIA In Basket 
 : Thank you for referring Mr. Vini Decker to me for evaluation/treatment. Below are the relevant portions of my assessment and plan of care. If you have questions, please do not hesitate to call me. I look forward to following Mr. Alphonse Vivar along with you. Sincerely, Tu Culver MD

## 2017-04-11 NOTE — PROGRESS NOTES
NEUROLOGY NEW PATIENT OFFICE CONSULTATION      4/11/2017    RE: Corby Mcnulty.         1955      REFERRED BY:  Tashia Puri MD        CHIEF COMPLAINT:  This is Corby Mcnulty. is a 63 y.o. male right handed on disability for lower back who had concerns including Memory Loss; Fall; and Dizziness. 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 (-) helmet (+) confused. Patient was brought to [facility] and found to have a skull fracture with note of weakness of the L arm. Patient was discharged on unknown seizure med. 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 No witnesses, (-) seat belt (-) alcohol. V suspended his license. Since then, patient will have episodes of \"blacking out\" every 2-3 months, described as confused with blank stares and mumbling (+) tongue bite   (-) incontinence. 3 yrs ago, patient was placed on Tegretol for presumed seizures with reduced events. Baseline dizziness all the time with gait disturbance and falls. Review of Systems   Constitutional: Negative for chills, fever and weight loss. All other systems reviewed and are negative. Past Medical Hx  Past Medical History:   Diagnosis Date    H/O seasonal allergies     Hypertension        Social Hx  Social History     Social History    Marital status: SINGLE     Spouse name: N/A    Number of children: N/A    Years of education: N/A     Social History Main Topics    Smoking status: Current Every Day Smoker     Packs/day: 1.00     Types: Cigarettes    Smokeless tobacco: Never Used    Alcohol use No    Drug use: No    Sexual activity: Not Asked     Other Topics Concern    None     Social History Narrative   quit alcohol 30 yrs ago     Family Hx  Family History   Problem Relation Age of Onset    Family history unknown:  Yes       ALLERGIES  No Known Allergies    CURRENT MED'S  Current Outpatient Prescriptions Medication Sig Dispense Refill    pravastatin (PRAVACHOL) 10 mg tablet TAKE ONE TABLET BY MOUTH NIGHTLY 30 Tab 5    FLUoxetine (PROZAC) 10 mg capsule Take 1 Cap by mouth daily. 30 Cap 6    carBAMazepine (TEGRETOL) 100 mg chewable tablet CHEW & SWALLOW 1 TABLET BY MOUTH 3 TIMES A DAY 90 Tab 3    metoprolol tartrate (LOPRESSOR) 25 mg tablet TAKE 1/2 TABLET BY MOUTH TWICE DAILY 30 Tab 5    losartan (COZAAR) 100 mg tablet TAKE ONE TABLET BY MOUTH DAILY 30 Tab 3    aspirin delayed-release 81 mg tablet Take  by mouth daily.  cetirizine (ZYRTEC) 10 mg tablet Take 1 Tab by mouth daily. 90 Tab 3           PREVIOUS WORKUP: (reviewed)  IMAGING:    EEG: (2013): IMPRESSION: Normal awake and drowsy study. No epileptiform discharges or any other paroxysmal activities or focal abnormalities seen. Clinical correlation is recommended. CT Results (recent):    Results from Hospital Encounter encounter on 09/17/14   CT HEAD W WO CONT   Narrative **Final Report**      ICD Codes / Adm. Diagnosis: 803.00  477.9 / Other closed skull fracture wi    Examination:  CT HEAD W AND WO CON  - [id number] - Sep 17 2014  1:54PM  Accession No:  [id number]  Reason:  sequelae from skull fracture      REPORT:  INDICATION:   sequelae from skull fracture    EXAM:  HEAD CT WITH AND WITHOUT CONTRAST    COMPARISON:  March 22, 2013    TECHNIQUE:  Axial head CT was performed both before and after the uneventful   administration of 100 CC Optiray 320. FINDINGS:    The ventricles are midline without hydrocephalus. There is no acute intra   or extra-axial fluid hemorrhage. There is a focal area of encephalomalacia   in the lateral aspect of the right temporal lobe. The basal cisterns are   patent. The paranasal sinuses are clear. There is no abnormal parenchymal or   meningeal enhancement. No acute skull fracture identified. There is a   corticated lucency in the left temporal bone anterior to the mastoid which   could represent healed fracture. Mastoid air cells and paranasal sinuses are   clear. There is no significant soft tissue swelling. IMPRESSION:    No acute abnormality. Suspect healed left temporal bone fracture and focal   right temporal lobe encephalomalacia. No abnormal enhancement. Signing/Reading Doctor: Ced Rebolledo ([id number])    Approved: Ced Rebolledo ([id number])  Sep 17 2014  2:55PM                                     MRI Results (recent):  No results found for this or any previous visit. IR Results (recent):  No results found for this or any previous visit. VAS/US Results (recent):  No results found for this or any previous visit. LABS (reviewed)  Results for orders placed or performed in visit on 08/25/16   CBC W/O DIFF   Result Value Ref Range    WBC 5.6 3.4 - 10.8 x10E3/uL    RBC 4.84 4.14 - 5.80 x10E6/uL    HGB 15.4 12.6 - 17.7 g/dL    HCT 46.1 37.5 - 51.0 %    MCV 95 79 - 97 fL    MCH 31.8 26.6 - 33.0 pg    MCHC 33.4 31.5 - 35.7 g/dL    RDW 13.2 12.3 - 15.4 %    PLATELET 512 012 - 896 O17L6/ID   METABOLIC PANEL, COMPREHENSIVE   Result Value Ref Range    Glucose 91 65 - 99 mg/dL    BUN 20 6 - 24 mg/dL    Creatinine 1.13 0.76 - 1.27 mg/dL    GFR est non-AA 71 >59 mL/min/1.73    GFR est AA 82 >59 mL/min/1.73    BUN/Creatinine ratio 18 9 - 20    Sodium 140 134 - 144 mmol/L    Potassium 5.6 (H) 3.5 - 5.2 mmol/L    Chloride 100 97 - 108 mmol/L    CO2 24 18 - 29 mmol/L    Calcium 9.0 8.7 - 10.2 mg/dL    Protein, total 6.6 6.0 - 8.5 g/dL    Albumin 4.6 3.5 - 5.5 g/dL    GLOBULIN, TOTAL 2.0 1.5 - 4.5 g/dL    A-G Ratio 2.3 1.1 - 2.5    Bilirubin, total 0.4 0.0 - 1.2 mg/dL    Alk.  phosphatase 105 39 - 117 IU/L    AST (SGOT) 11 0 - 40 IU/L    ALT (SGPT) 13 0 - 44 IU/L   LIPID PANEL   Result Value Ref Range    Cholesterol, total 180 100 - 199 mg/dL    Triglyceride 111 0 - 149 mg/dL    HDL Cholesterol 56 >39 mg/dL    VLDL, calculated 22 5 - 40 mg/dL    LDL, calculated 102 (H) 0 - 99 mg/dL   TSH 3RD GENERATION   Result Value Ref Range    TSH 1.170 0.450 - 4.500 uIU/mL       Physical Exam:     Visit Vitals    /88 (BP 1 Location: Left arm, BP Patient Position: Sitting)    Pulse (!) 55    Ht 6' 1\" (1.854 m)    Wt 93 kg (205 lb)    SpO2 99%    BMI 27.05 kg/m2     General:  Alert, cooperative, no distress. Head:  Normocephalic, without obvious abnormality, atraumatic. Eyes:  Conjunctivae/corneas clear. Lungs:  Heart:   Non labored breathing  Regular rate and rhythm, no carotid bruits   Abdomen:   Soft, non-distended   Extremities: Extremities normal, atraumatic, no cyanosis or edema. Pulses: 2+ and symmetric all extremities. Skin: Skin color, texture, turgor normal. No rashes or lesions. Neurologic Exam     Gen: Attention normal             Language: naming, repetition, fluency normal             Memory: intact recent and remote memory  Cranial Nerves:  I: smell Not tested   II: visual fields Full to confrontation   II: pupils Equal, round, reactive to light   II: optic disc No papilledema   III,VII: ptosis none   III,IV,VI: extraocular muscles  Full ROM   V: mastication normal   V: facial light touch sensation  normal   VII: facial muscle function   Mild L facial droop   VIII: hearing symmetric   IX: soft palate elevation  normal   XI: trapezius strength  5/5   XI: sternocleidomastoid strength 5/5   XI: neck flexion strength  5/5   XII: tongue  midline     Motor: (+) dec L finger and L foot tapping  Sensory: intact to LT, PP, vibration, and JPS  Reflexes: 2+ throughout; Down going toes  Coordination: Good FTN and HTS, Romberg negative  Gait: Drags his L side with spatic gait         Impression:     Fiordaliza Sky. is a 63 y.o. male who  has a past medical history of H/O seasonal allergies and Hypertension. who in [address] [place] [address],[facility], patient was at work running a [employer] and fell 12 feet into a concrete floor. (-) helmet (+) confused.   Patient was brought to [facility] and found to have a skull fracture with note of weakness of the L arm consistent with traumatic brain injury (TBI). CT head in 2014 did show healed left temporal bone fracture and focal   right temporal lobe encephalomalacia. Patient was discharged on unknown seizure meds. 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 Since then, patient will have episodes of \"blacking out\" every 2-3 months, described as confused with blank stares and mumbling concerning for complex partial seizure with secondary generalization. Neurologic exam revealed a spatic L gait which can explain his gait and balance issues. RECOMMENDATIONS  1. I had a long discussion with patient that he had reached his maximum medical improvement from the TBI in 1995. Certainly his current cognitive and gait/balance issue are now his baseline from it. 2. Since patient is doing well on Tegretol with no more seizure episode, patient can continue Tegretol. 3. Will refer to physical therapy for gait and balance training with leg strengthening. Patient will think about it. Mr. Sharmin Sanders has a reminder for a \"due or due soon\" health maintenance. I have asked that he contact his primary care provider for follow-up on this health maintenance. Follow-up Disposition:  Return in about 3 months (around 7/11/2017).         Thank you for the consultation      Divya Almonte MD  Diplomate, American Board of Psychiatry and Neurology  Diplomate, Neuromuscular Medicine  Diplomate, American Board of Electrodiagnostic Medicine    Greater than 50% of time spent counseling patient      CC: Monserrat He MD  Fax: [phone]

## 2017-04-11 NOTE — PATIENT INSTRUCTIONS
Traumatic Brain Injury, Long-Term Healing: Care Instructions  Your Care Instructions    A traumatic brain injury (TBI) means the brain has been bruised, swollen, or torn. This can be caused by a blow to the head or body, a fall, or another injury that jars or shakes the brain. It will take time for you to get better. You may worry about how you are feeling. This is normal. TBIs often have long-term effects. These include:  · Not thinking clearly, or having trouble remembering new information. · Having headaches, vision problems, or dizziness. · Feeling sad or nervous. · Getting angry easily. · Sleeping more or less than usual.  No one will be able to tell you for sure how long the symptoms will last. But there are things you can do to help yourself get better. You may need another person to watch you closely to make sure that your symptoms aren't getting worse. Follow your doctor's instructions about how long you need someone to stay with you. Follow-up care is a key part of your treatment and safety. Be sure to make and go to all appointments, and call your doctor if you are having problems. It's also a good idea to know your test results and keep a list of the medicines you take. How can you care for yourself at home? What you and your doctors can do  Different types of therapy and support may used to help you recover from a TBI. Follow the plan your doctor suggests. This may include:  · Physical and occupational therapy. These help you return to daily activities and live as independently as possible. · Speech and language therapy. You may need help understanding and producing language. Speech and language therapists also help you organize daily tasks and develop problem-solving methods. · Counseling. This can help you understand your thoughts and learn ways to cope with your feelings. Counseling can help you feel more in control. It can help get you back to your life's activities.   · Social support and support groups. It's important that you get the chance to talk with people who are going through the same things you are. Your family or friends may be able to help you get treatment and deal with your symptoms. · Medicines. These may help relieve symptoms like sleep problems, chronic pain, and headaches. Medicine can also help if you have anxiety, depression, or memory problems. Talk with your doctor about what medicines might be best for you. Also ask which medicines you should not take. What you can do  Here are some ways you can help yourself:  · Get plenty of sleep, and take it easy during the day. Rest is the best way to recover. · Don't drink alcohol or use illegal drugs. · Don't drive a car, ride a bike, or operate machinery until your doctor says it's okay. · Avoid activities that are physically or mentally demanding. These include housework, exercise, schoolwork, video games, text messaging, or using the computer. You may need to change your school or work schedule for a while. · If you feel grumpy or irritable, get away from whatever is bothering you. When should you call for help? Watch closely for changes in your health, and be sure to contact your doctor if:  · Your symptoms get worse. These include headaches, trouble concentrating, or changes in your mood. · You have been feeling sad, depressed, or hopeless, or have lost interest in things you usually enjoy. · You do not get better as expected. Where can you learn more? Go to http://jonatan-donna.info/. Enter P943 in the search box to learn more about \"Traumatic Brain Injury, Long-Term Healing: Care Instructions. \"  Current as of: October 14, 2016  Content Version: 11.2  © 9156-8159 [employer]. Care instructions adapted under license by [employer] (which disclaims liability or warranty for this information).  If you have questions about a medical condition or this instruction, always ask your healthcare professional. [place] [address] any warranty or liability for your use of this information. Epilepsy: Care Instructions  Your Care Instructions  Epilepsy is a common condition that causes repeated seizures. The seizures are caused by bursts of electrical activity in the brain that aren't normal. Seizures may cause problems with muscle control, movement, speech, vision, or awareness. They can be scary. Epilepsy affects each person differently. Some people have only a few seizures. Others get them more often. If you know what triggers a seizure, you may be able to avoid having one. You can take medicines to control and reduce seizures. You and your doctor will need to find the right combination, schedule, and dose of medicine. This may take time and careful changes. Seizures may get worse and happen more often over time. Follow-up care is a key part of your treatment and safety. Be sure to make and go to all appointments, and call your doctor if you are having problems. It's also a good idea to know your test results and keep a list of the medicines you take. How can you care for yourself at home? · Be safe with medicines. Take your medicines exactly as prescribed. Call your doctor if you think you are having a problem with your medicine. · Make a treatment plan with your doctor. Be sure to follow your plan. · Try to identify and avoid things that may make you more likely to have a seizure. These may include:  ¨ Not getting enough sleep. ¨ Using drugs or alcohol. ¨ Being emotionally stressed. ¨ Skipping meals. · Keep a record of any seizures you have. Note the date, time of day, and any details about the seizure that you can remember. Your doctor can use this information to plan or adjust your medicine or other treatment. · Be sure that any doctor treating you for another condition knows that you have epilepsy.  Each doctor should know what medicines you are taking, if any. · Wear a medical ID bracelet. You can buy this at most [employer]. If you have a seizure that leaves you unconscious or unable to speak for yourself, this bracelet will let those who are treating you know that you have epilepsy. · Talk to your doctor about whether it is safe for you to do certain activities, such as drive or swim. When should you call for help? Call 911 anytime you think you may need emergency care. For example, call if:  · A seizure does not stop as it normally does. · You have new symptoms such as:  ¨ Numbness, tingling, or weakness on one side of your body or face. ¨ Vision changes. ¨ Trouble speaking or thinking clearly. Call your doctor now or seek immediate medical care if:  · You have a fever. · You have a severe headache. Watch closely for changes in your health, and be sure to contact your doctor if:  · The normal pattern or features of your seizures change. Where can you learn more? Go to http://jonatan-donna.info/. Marco Antonio Ko in the search box to learn more about \"Epilepsy: Care Instructions. \"  Current as of: October 14, 2016  Content Version: 11.2  © 8691-9507 [employer]. Care instructions adapted under license by [employer] (which disclaims liability or warranty for this information). If you have questions about a medical condition or this instruction, always ask your healthcare professional. [place] [address] any warranty or liability for your use of this information.

## 2017-04-13 RX ORDER — CARBAMAZEPINE 100 MG/1
TABLET, CHEWABLE ORAL
Qty: 90 TAB | Refills: 3 | Status: SHIPPED | OUTPATIENT
Start: 2017-04-13 | End: 2017-08-15 | Stop reason: SDUPTHER

## 2017-07-11 ENCOUNTER — OFFICE VISIT (OUTPATIENT)
Dept: NEUROLOGY | Age: 62
End: 2017-07-11

## 2017-07-11 VITALS
SYSTOLIC BLOOD PRESSURE: 141 MMHG | HEART RATE: 66 BPM | RESPIRATION RATE: 20 BRPM | OXYGEN SATURATION: 96 % | WEIGHT: 209.8 LBS | TEMPERATURE: 98.9 F | BODY MASS INDEX: 27.8 KG/M2 | DIASTOLIC BLOOD PRESSURE: 95 MMHG | HEIGHT: 73 IN

## 2017-07-11 DIAGNOSIS — R56.9 SEIZURES (HCC): ICD-10-CM

## 2017-07-11 DIAGNOSIS — S06.9X9S TBI (TRAUMATIC BRAIN INJURY), WITH LOC OF UNSPECIFIED DURATION, SEQUELA: Primary | ICD-10-CM

## 2017-07-11 NOTE — PROGRESS NOTES
Neurology Consult      Subjective:      Yamilka Serrato is a 63 y.o. male who returns in revisit since seeing my colleague 3 months ago. Has previous documentation of remote traumatic brain injury 1995 with care at [facility]. Discharged home on seizure medicine and later would have loss of consciousness spells and placed on Tegretol and had better outcomes? Currently on 100 mg 3 times daily of the chewable and manages brief blank stares faulty memory episodes once in a while. Was described as having baseline dizziness and gait dysfunction occasional falls. Physical therapy option exercised on last visit but said he could not afford this. Today mentions his left hand and a finger injury in the context of his remote injury. What I see is a Deputyren's contracture of the left hand and could not positively link that with his remote 1995 injury or care. Complains about his memory speed of information processing that goes back to his remote injury. I offered him formal neuro-psychological testing but cannot get transportation to the test site. If this changes I would be more than glad to get it accomplished. 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 Could intervene with a neuralgic agent but is already on Tegretol and not committal to any medication changes today. Current Outpatient Prescriptions   Medication Sig Dispense Refill    metoprolol tartrate (LOPRESSOR) 25 mg tablet TAKE 1/2 TABLET BY MOUTH TWICE DAILY 30 Tab 5    carBAMazepine (TEGRETOL) 100 mg chewable tablet CHEW & SWALLOW 1 TABLET BY MOUTH 3 TIMES A DAY 90 Tab 3    pravastatin (PRAVACHOL) 10 mg tablet TAKE ONE TABLET BY MOUTH NIGHTLY 30 Tab 5    FLUoxetine (PROZAC) 10 mg capsule Take 1 Cap by mouth daily.  30 Cap 6    losartan (COZAAR) 100 mg tablet TAKE ONE TABLET BY MOUTH DAILY 30 Tab 3    aspirin delayed-release 81 mg tablet Take  by mouth daily.      cetirizine (ZYRTEC) 10 mg tablet Take 1 Tab by mouth daily. 80 Tab 3      No Known Allergies  Past Medical History:   Diagnosis Date    H/O seasonal allergies     Hypertension       No past surgical history on file. Social History     Social History    Marital status: SINGLE     Spouse name: N/A    Number of children: N/A    Years of education: N/A     Occupational History    Not on file. Social History Main Topics    Smoking status: Current Every Day Smoker     Packs/day: 1.00     Types: Cigarettes    Smokeless tobacco: Never Used    Alcohol use No    Drug use: No    Sexual activity: Not on file     Other Topics Concern    Not on file     Social History Narrative      Family History   Problem Relation Age of Onset    Family history unknown: Yes      Visit Vitals    BP (!) 141/95    Pulse 66    Temp 98.9 °F (37.2 °C) (Oral)    Resp 20    Ht 6' 1\" (1.854 m)    Wt 209 lb 12.8 oz (95.2 kg)    SpO2 96%    BMI 27.68 kg/m2        Review of Systems:   A comprehensive review of systems was negative except for that written in the HPI. Neuro Exam:     Appearance: The patient is well developed, well nourished, provides a coherent history and is in no acute distress. Mental Status: Oriented to time, place and person. Mood and affect appropriate. Cranial Nerves:   Intact visual fields. Fundi are benign. SANTIAGO, EOM's full, no nystagmus, no ptosis. Facial sensation is normal. Corneal reflexes are intact. Facial movement is symmetric. Hearing is normal bilaterally. Palate is midline with normal sternocleidomastoid and trapezius muscles are normal. Tongue is midline. Motor:  5/5 strength in upper and lower proximal and distal muscles of the right. On the left has left upper extremity strength 4 plus to 5- with a left fracture. In the left leg he is 4 to 4+. normal bulk and tone. No fasciculations. Reflexes:   Deep tendon reflexes 2+/4 and symmetrical except trace ankle jerks. Sensory:   Normal to touch, pinprick and vibration. Gait:   Has a left spastic hemiparetic gait    Tremor:   No tremor noted. Cerebellar:  No cerebellar signs present. Neurovascular:  Normal heart sounds and regular rhythm, peripheral pulses intact, and no carotid bruits. Assessment:   History of remote traumatic brain injury 1995 previously stipulated. Previously offered physical therapy cannot afford it. Has issues with permission processing and memory and offered psychological testing but cannot get transportation to the [facility] office. If this gets worked out would be more than glad implement these suggestions. Does appear to have a left Dupuytren's contracture which I cannot absolutely link to his TBI. Has point tenderness over the scalp and what appears to be a previous sharon hole and could represent scalp sensitivity. Plan:   Revisit 6 months.   Signed by :  Hugo Lucio MD

## 2017-07-11 NOTE — PATIENT INSTRUCTIONS
[facility]   Statement to Patients  April 1, 2014      In an effort to ensure the large volume of patient prescription refills is processed in the most efficient and expeditious manner, we are asking our patients to assist us by calling your Pharmacy for all prescription refills, this will include also your  Mail Order Pharmacy. The pharmacy will contact our office electronically to continue the refill process. Please do not wait until the last minute to call your pharmacy. We need at least 48 hours (2days) to fill prescriptions. We also encourage you to call your pharmacy before going to  your prescription to make sure it is ready. With regard to controlled substance prescription refill requests (narcotic refills) that need to be picked up at our office, we ask your cooperation by providing us with at least 72 hours (3days) notice that you will need a refill. We will not refill narcotic prescription refill requests after 4:00pm on any weekday, Monday through Thursday, or after 2:00pm on Fridays, or on the weekends. We encourage everyone to explore another way of getting your prescription refill request processed using [employer], our patient web portal through our electronic medical record system. [employer] is an efficient and effective way to communicate your medication request directly to the office and  downloadable as an pam on your smart phone . [employer] also features a review functionality that allows you to view your medication list as well as leave messages for your physician. Are you ready to get connected? If so please review the attatched instructions or speak to any of our staff to get you set up right away! Thank you so much for your cooperation. Should you have any questions please contact our Practice Administrator.     The Physicians and Staff,  [facility] [address]  What is a living will?  A living will is a legal form you use to write down the kind of care you want at the end of your life. It is used by the health professionals who will treat you if you aren't able to decide for yourself. If you put your wishes in writing, your loved ones and others will know what kind of care you want. They won't need to guess. This can ease your mind and be helpful to others. A living will is not the same as an estate or property will. An estate will explains what you want to happen with your money and property after you die. Is a living will a legal document? A living will is a legal document. Each state has its own laws about living oleary. If you move to another state, make sure that your living will is legal in the state where you now live. Or you might use a universal form that has been approved by many states. This kind of form can sometimes be completed and stored online. Your electronic copy will then be available wherever you have a connection to the Internet. In most cases, doctors will respect your wishes even if you have a form from a different state. · You don't need an  to complete a living will. But legal advice can be helpful if your state's laws are unclear, your health history is complicated, or your family can't agree on what should be in your living will. · You can change your living will at any time. Some people find that their wishes about end-of-life care change as their health changes. · In addition to making a living will, think about completing a medical power of  form. This form lets you name the person you want to make end-of-life treatment decisions for you (your \"health care agent\") if you're not able to. Many hospitals and nursing homes will give you the forms you need to complete a living will and a medical power of . · Your living will is used only if you can't make or communicate decisions for yourself anymore.  If you become able to make decisions again, you can accept or refuse any treatment, no matter what you wrote in your living will. · Your state may offer an online registry. This is a place where you can store your living will online so the doctors and nurses who need to treat you can find it right away. What should you think about when creating a living will? Talk about your end-of-life wishes with your family members and your doctor. Let them know what you want. That way the people making decisions for you won't be surprised by your choices. Think about these questions as you make your living will:  · Do you know enough about life support methods that might be used? If not, talk to your doctor so you know what might be done if you can't breathe on your own, your heart stops, or you're unable to swallow. · What things would you still want to be able to do after you receive life-support methods? Would you want to be able to walk? To speak? To eat on your own? To live without the help of machines? · If you have a choice, where do you want to be cared for? In your home? At a hospital or nursing home? · Do you want certain Scientology practices performed if you become very ill? · If you have a choice at the end of your life, where would you prefer to die? At home? In a hospital or nursing home? Somewhere else? · Would you prefer to be buried or cremated? · Do you want your organs to be donated after you die? What should you do with your living will? · Make sure that your family members and your health care agent have copies of your living will. · Give your doctor a copy of your living will to keep in your medical record. If you have more than one doctor, make sure that each one has a copy. · You may want to put a copy of your living will where it can be easily found. Where can you learn more? Go to http://jonatan-donna.info/. Enter D841 in the search box to learn more about \"Learning About Living Perdelio. \"  Current as of: August 8, 2016  Content Version: 11.3  © 7203-0180 [employer]. Care instructions adapted under license by [employer] (which disclaims liability or warranty for this information). If you have questions about a medical condition or this instruction, always ask your healthcare professional. [address] any warranty or liability for your use of this information. Could suggest that rehab through physical therapy and formal memory testing might help advanced case here . I cannot think of anything else to advance his care at this time. Continue carbamazepine as previously ordered. And revisit in 6 months.

## 2017-07-11 NOTE — MR AVS SNAPSHOT
Visit Information Date & Time Provider Department Dept. Phone Encounter #  
 7/11/2017  9:00 AM Farheen Han MD Neurology [facility], [id number] [phone] [id number] Follow-up Instructions Return in about 6 months (around 1/11/2018). Your Appointments 12/12/2017  9:00 AM  
Follow Up with Shwetha Hugo MD  
Neurology [address], [id number]) Appt Note: follow up TBI/head pain  $0CP  noel  7/11/17 [address] [address] [phone]  
  
   
 [address] [address] Upcoming Health Maintenance Date Due Pneumococcal 19-64 Medium Risk (1 of 1 - PPSV23) 9/12/1976 FOBT Q 1 YEAR AGE 50-75 12/10/2015 INFLUENZA AGE 9 TO ADULT 8/1/2017 DTaP/Tdap/Td series (2 - Td) 2/3/2027 Allergies as of 7/11/2017  Review Complete On: 7/11/2017 By: Farheen Han MD  
 No Known Allergies Current Immunizations  Reviewed on 7/19/2012 Name Date Influenza Vaccine 9/6/2013 Influenza Vaccine (Quad) PF 11/28/2016, 11/17/2015, 11/25/2014 Tdap 2/3/2017 Not reviewed this visit You Were Diagnosed With   
  
 Codes Comments TBI (traumatic brain injury), with LOC of unspecified duration, sequela    -  Primary ICD-10-CM: H98.1I3A 
ICD-9-CM: 907.0 Seizures ([facility] [address]     ICD-10-CM: R56.9 ICD-9-CM: 780.39 Vitals BP Pulse Temp Resp Height(growth percentile) Weight(growth percentile) (!) 141/95 66 98.9 °F (37.2 °C) (Oral) 20 6' 1\" (1.854 m) 209 lb 12.8 oz (95.2 kg) SpO2 BMI Smoking Status 96% 27.68 kg/m2 Current Every Day Smoker Vitals History BMI and BSA Data Body Mass Index Body Surface Area  
 27.68 kg/m 2 2.21 m 2 Preferred Pharmacy Pharmacy Name Phone [address] [place], [place] - [address] [facility] [phone] Your Updated Medication List  
  
   
 This list is accurate as of: 7/11/17  9:28 AM.  Always use your most recent med list.  
  
  
  
  
 aspirin delayed-release 81 mg tablet Take  by mouth daily. carBAMazepine 100 mg chewable tablet Commonly known as:  TEGretol CHEW & SWALLOW 1 TABLET BY MOUTH 3 TIMES A DAY  
  
 cetirizine 10 mg tablet Commonly known as:  ZYRTEC Take 1 Tab by mouth daily. FLUoxetine 10 mg capsule Commonly known as:  PROzac Take 1 Cap by mouth daily. losartan 100 mg tablet Commonly known as:  COZAAR  
TAKE ONE TABLET BY MOUTH DAILY  
  
 metoprolol tartrate 25 mg tablet Commonly known as:  LOPRESSOR  
TAKE 1/2 TABLET BY MOUTH TWICE DAILY pravastatin 10 mg tablet Commonly known as:  PRAVACHOL  
TAKE ONE TABLET BY MOUTH NIGHTLY Follow-up Instructions Return in about 6 months (around 1/11/2018). Patient Instructions PRESCRIPTION REFILL POLICY [facility] Statement to Patients April 1, 2014 In an effort to ensure the large volume of patient prescription refills is processed in the most efficient and expeditious manner, we are asking our patients to assist us by calling your Pharmacy for all prescription refills, this will include also your  Mail Order Pharmacy. The pharmacy will contact our office electronically to continue the refill process. Please do not wait until the last minute to call your pharmacy. We need at least 48 hours (2days) to fill prescriptions. We also encourage you to call your pharmacy before going to  your prescription to make sure it is ready. With regard to controlled substance prescription refill requests (narcotic refills) that need to be picked up at our office, we ask your cooperation by providing us with at least 72 hours (3days) notice that you will need a refill.  
 
We will not refill narcotic prescription refill requests after 4:00pm on any weekday, Monday through Thursday, or after 2:00pm on Fridays, or on the weekends. We encourage everyone to explore another way of getting your prescription refill request processed using [employer], our patient web portal through our electronic medical record system. [employer] is an efficient and effective way to communicate your medication request directly to the office and  downloadable as an pam on your smart phone . [employer] also features a review functionality that allows you to view your medication list as well as leave messages for your physician. Are you ready to get connected? If so please review the attatched instructions or speak to any of our staff to get you set up right away! Thank you so much for your cooperation. Should you have any questions please contact our Practice Administrator. The Physicians and Staff,  [facility] Tigre Castillo [address] What is a living will? A living will is a legal form you use to write down the kind of care you want at the end of your life. It is used by the health professionals who will treat you if you aren't able to decide for yourself. If you put your wishes in writing, your loved ones and others will know what kind of care you want. They won't need to guess. This can ease your mind and be helpful to others. A living will is not the same as an estate or property will. An estate will explains what you want to happen with your money and property after you die. Is a living will a legal document? A living will is a legal document. Each state has its own laws about living oleayr. If you move to another state, make sure that your living will is legal in the state where you now live. Or you might use a universal form that has been approved by many states. This kind of form can sometimes be completed and stored online. Your electronic copy will then be available wherever you have a connection to the Internet.  In most cases, doctors will respect your wishes even if you have a form from a different state. · You don't need an  to complete a living will. But legal advice can be helpful if your state's laws are unclear, your health history is complicated, or your family can't agree on what should be in your living will. · You can change your living will at any time. Some people find that their wishes about end-of-life care change as their health changes. · In addition to making a living will, think about completing a medical power of  form. This form lets you name the person you want to make end-of-life treatment decisions for you (your \"health care agent\") if you're not able to. Many hospitals and nursing homes will give you the forms you need to complete a living will and a medical power of . · Your living will is used only if you can't make or communicate decisions for yourself anymore. If you become able to make decisions again, you can accept or refuse any treatment, no matter what you wrote in your living will. · Your state may offer an online registry. This is a place where you can store your living will online so the doctors and nurses who need to treat you can find it right away. What should you think about when creating a living will? Talk about your end-of-life wishes with your family members and your doctor. Let them know what you want. That way the people making decisions for you won't be surprised by your choices. Think about these questions as you make your living will: · Do you know enough about life support methods that might be used? If not, talk to your doctor so you know what might be done if you can't breathe on your own, your heart stops, or you're unable to swallow. · What things would you still want to be able to do after you receive life-support methods? Would you want to be able to walk? To speak? To eat on your own? To live without the help of machines? · If you have a choice, where do you want to be cared for? In your home? At a hospital or nursing home? · Do you want certain Adventism practices performed if you become very ill? · If you have a choice at the end of your life, where would you prefer to die? At home? In a hospital or nursing home? Somewhere else? · Would you prefer to be buried or cremated? · Do you want your organs to be donated after you die? What should you do with your living will? · Make sure that your family members and your health care agent have copies of your living will. · Give your doctor a copy of your living will to keep in your medical record. If you have more than one doctor, make sure that each one has a copy. · You may want to put a copy of your living will where it can be easily found. Where can you learn more? Go to http://jonatan-donna.info/. Enter R340 in the search box to learn more about \"Learning About Living Callie. \" Current as of: August 8, 2016 Content Version: 11.3 © 4737-7425 [employer]. Care instructions adapted under license by [employer] (which disclaims liability or warranty for this information). If you have questions about a medical condition or this instruction, always ask your healthcare professional. [address] any warranty or liability for your use of this information. Could suggest that rehab through physical therapy and formal memory testing might help advanced case here . I cannot think of anything else to advance his care at this time. Continue carbamazepine as previously ordered. And revisit in 6 months. Please provide this summary of care documentation to your next provider. Your primary care clinician is listed as Paty Sapp. If you have any questions after today's visit, please call [phone].

## 2017-07-26 ENCOUNTER — OFFICE VISIT (OUTPATIENT)
Dept: FAMILY MEDICINE CLINIC | Age: 62
End: 2017-07-26

## 2017-07-26 VITALS
RESPIRATION RATE: 20 BRPM | SYSTOLIC BLOOD PRESSURE: 130 MMHG | HEART RATE: 69 BPM | TEMPERATURE: 97.5 F | OXYGEN SATURATION: 96 % | DIASTOLIC BLOOD PRESSURE: 79 MMHG

## 2017-07-26 DIAGNOSIS — S46.912A SHOULDER STRAIN, LEFT, INITIAL ENCOUNTER: ICD-10-CM

## 2017-07-26 DIAGNOSIS — S40.812A ABRASION OF LEFT ARM, INITIAL ENCOUNTER: Primary | ICD-10-CM
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
## 2017-07-26 RX ORDER — MUPIROCIN 20 MG/G
OINTMENT TOPICAL DAILY
Qty: 22 G | Refills: 0 | Status: SHIPPED | OUTPATIENT
Start: 2017-07-26 | End: 2018-02-26

## 2017-07-26 RX ORDER — DICLOFENAC SODIUM 75 MG/1
75 TABLET, DELAYED RELEASE ORAL 2 TIMES DAILY
Qty: 30 TAB | Refills: 0 | Status: SHIPPED | OUTPATIENT
Start: 2017-07-26 | End: 2018-02-26

## 2017-07-26 NOTE — PROGRESS NOTES
Reviewed record in preparation for visit and have necessary documentation  Pt did not bring medication to office visit for review  Information was given to pt on Advanced Directives, Living Will  Information was given on Shingles Vaccine  Opportunity was given for questions  Goals that were addressed and/or need to be completed after this appointment include   Health Maintenance Due   Topic Date Due    Pneumococcal 19-64 Medium Risk (1 of 1 - PPSV23) 09/12/1976    FOBT Q 1 YEAR AGE 50-75  12/10/2015    ZOSTER VACCINE AGE 60>  07/12/2017

## 2017-07-26 NOTE — PATIENT INSTRUCTIONS

## 2017-07-26 NOTE — MR AVS SNAPSHOT
Visit Information Date & Time Provider Department Dept. Phone Encounter #  
 7/26/2017  2:40 PM Paty Sapp MD [address] [place] [address] [id number] Your Appointments 12/12/2017  9:00 AM  
Follow Up with Shanice Castillo MD  
Neurology [address], [id number] [place] [address]) Appt Note: follow up TBI/head pain  $0CP  noel  7/11/17 [address] [address]  
[phone]  
  
   
 [address] [address] Upcoming Health Maintenance Date Due Pneumococcal 19-64 Medium Risk (1 of 1 - PPSV23) 9/12/1976 FOBT Q 1 YEAR AGE 50-75 12/10/2015 ZOSTER VACCINE AGE 60> 7/12/2017 INFLUENZA AGE 9 TO ADULT 8/1/2017 DTaP/Tdap/Td series (2 - Td) 2/3/2027 Allergies as of 7/26/2017  Review Complete On: 7/26/2017 By: Deb Murphy LPN No Known Allergies Current Immunizations  Reviewed on 7/19/2012 Name Date Influenza Vaccine 9/6/2013 Influenza Vaccine (Quad) PF 11/28/2016, 11/17/2015, 11/25/2014 Tdap 2/3/2017 Not reviewed this visit You Were Diagnosed With   
  
 Codes Comments Abrasion of left arm, initial encounter    -  Primary ICD-10-CM: Z80.231L ICD-9-CM: 913.0 Shoulder strain, left, initial encounter     ICD-10-CM: L99.594R ICD-9-CM: 840.9 Fall, initial encounter     ICD-10-CM: W19. Chun Barragan ICD-9-CM: E888.9 Vitals BP Pulse Temp Resp SpO2 Smoking Status 130/79 69 97.5 °F (36.4 °C) (Oral) 20 96% Current Every Day Smoker Preferred Pharmacy Pharmacy Name Phone [address] [address] [facility] [phone] Your Updated Medication List  
  
   
This list is accurate as of: 7/26/17  2:47 PM.  Always use your most recent med list.  
  
  
  
  
 aspirin delayed-release 81 mg tablet Take  by mouth daily. carBAMazepine 100 mg chewable tablet Commonly known as:  TEGretol CHEW & SWALLOW 1 TABLET BY MOUTH 3 TIMES A DAY  
  
 cetirizine 10 mg tablet Commonly known as:  ZYRTEC Take 1 Tab by mouth daily. diclofenac EC 75 mg EC tablet Commonly known as:  VOLTAREN Take 1 Tab by mouth two (2) times a day. FLUoxetine 10 mg capsule Commonly known as:  PROzac Take 1 Cap by mouth daily. losartan 100 mg tablet Commonly known as:  COZAAR  
TAKE ONE TABLET BY MOUTH DAILY  
  
 metoprolol tartrate 25 mg tablet Commonly known as:  LOPRESSOR  
TAKE 1/2 TABLET BY MOUTH TWICE DAILY  
  
 mupirocin 2 % ointment Commonly known as:  [employer] Apply  to affected area daily. pravastatin 10 mg tablet Commonly known as:  PRAVACHOL  
TAKE ONE TABLET BY MOUTH NIGHTLY Prescriptions Sent to Pharmacy Refills  
 mupirocin (BACTROBAN) 2 % ointment 0 Sig: Apply  to affected area daily. Class: Normal  
 Pharmacy: [address] [place] [address] #: [phone] Route: Topical  
 diclofenac EC (VOLTAREN) 75 mg EC tablet 0 Sig: Take 1 Tab by mouth two (2) times a day. Class: Normal  
 Pharmacy: [address] [place] [address] #: [phone] Route: Oral  
  
Patient Instructions Preventing Falls: Care Instructions Your Care Instructions Getting around your home safely can be a challenge if you have injuries or health problems that make it easy for you to fall. Loose rugs and furniture in walkways are among the dangers for many older people who have problems walking or who have poor eyesight. People who have conditions such as arthritis, osteoporosis, or dementia also have to be careful not to fall. You can make your home safer with a few simple measures. Follow-up care is a key part of your treatment and safety. Be sure to make and go to all appointments, and call your doctor if you are having problems.  It's also a good idea to know your test results and keep a list of the medicines you take. How can you care for yourself at home? Taking care of yourself · You may get dizzy if you do not drink enough water. To prevent dehydration, drink plenty of fluids, enough so that your urine is light yellow or clear like water. Choose water and other caffeine-free clear liquids. If you have kidney, heart, or liver disease and have to limit fluids, talk with your doctor before you increase the amount of fluids you drink. · Exercise regularly to improve your strength, muscle tone, and balance. Walk if you can. Swimming may be a good choice if you cannot walk easily. · Have your vision and hearing checked each year or any time you notice a change. If you have trouble seeing and hearing, you might not be able to avoid objects and could lose your balance. · Know the side effects of the medicines you take. Ask your doctor or pharmacist whether the medicines you take can affect your balance. Sleeping pills or sedatives can affect your balance. · Limit the amount of alcohol you drink. Alcohol can impair your balance and other senses. · Ask your doctor whether calluses or corns on your feet need to be removed. If you wear loose-fitting shoes because of calluses or corns, you can lose your balance and fall. · Talk to your doctor if you have numbness in your feet. Preventing falls at home · Remove raised doorway thresholds, throw rugs, and clutter. Repair loose carpet or raised areas in the floor. · Move furniture and electrical cords to keep them out of walking paths. · Use nonskid floor wax, and wipe up spills right away, especially on ceramic tile floors. · If you use a walker or cane, put rubber tips on it. If you use crutches, clean the bottoms of them regularly with an abrasive pad, such as steel wool. · Keep your house well lit, especially Karyna Tilly, and outside walkways. Use night-lights in areas such as hallways and bathrooms.  Add extra light switches or use remote switches (such as switches that go on or off when you clap your hands) to make it easier to turn lights on if you have to get up during the night. · Install sturdy handrails on stairways. · Move items in your cabinets so that the things you use a lot are on the lower shelves (about waist level). · Keep a cordless phone and a flashlight with new batteries by your bed. If possible, put a phone in each of the main rooms of your house, or carry a cell phone in case you fall and cannot reach a phone. Or, you can wear a device around your neck or wrist. You push a button that sends a signal for help. · Wear low-heeled shoes that fit well and give your feet good support. Use footwear with nonskid soles. Check the heels and soles of your shoes for wear. Repair or replace worn heels or soles. · Do not wear socks without shoes on wood floors. · Walk on the grass when the sidewalks are slippery. If you live in an area that gets snow and ice in the winter, sprinkle salt on slippery steps and sidewalks. Preventing falls in the bath · Install grab bars and nonskid mats inside and outside your shower or tub and near the toilet and sinks. · Use shower chairs and bath benches. · Use a hand-held shower head that will allow you to sit while showering. · Get into a tub or shower by putting the weaker leg in first. Get out of a tub or shower with your strong side first. 
· Repair loose toilet seats and consider installing a raised toilet seat to make getting on and off the toilet easier. · Keep your bathroom door unlocked while you are in the shower. Where can you learn more? Go to http://jonatan-donna.info/. Enter [phone] [id number] [phone] in the search box to learn more about \"Preventing Falls: Care Instructions. \" Current as of: August 4, 2016 Content Version: 11.3 © 0660-6855 [employer].  Care instructions adapted under license by [address] (which disclaims liability or warranty for this information). If you have questions about a medical condition or this instruction, always ask your healthcare professional. [address] any warranty or liability for your use of this information. Please provide this summary of care documentation to your next provider. Your primary care clinician is listed as Esau Oneal. If you have any questions after today's visit, please call [phone].

## 2017-09-13 DIAGNOSIS — R56.9 CONVULSIONS, UNSPECIFIED CONVULSION TYPE (HCC): ICD-10-CM

## 2017-09-13 NOTE — TELEPHONE ENCOUNTER
Patient Requesting a refill only has a few days left of this medication    Thanks    Alliosn Daniels

## 2017-09-14 RX ORDER — CARBAMAZEPINE 100 MG/1
TABLET, CHEWABLE ORAL
Qty: 90 TAB | Refills: 3 | Status: SHIPPED | OUTPATIENT
Start: 2017-09-14 | End: 2018-04-17 | Stop reason: SDUPTHER

## 2017-09-18 ENCOUNTER — OFFICE VISIT (OUTPATIENT)
Dept: FAMILY MEDICINE CLINIC | Age: 62
End: 2017-09-18

## 2017-09-18 VITALS
TEMPERATURE: 98.4 F | WEIGHT: 213.2 LBS | DIASTOLIC BLOOD PRESSURE: 90 MMHG | HEART RATE: 64 BPM | RESPIRATION RATE: 20 BRPM | SYSTOLIC BLOOD PRESSURE: 150 MMHG | OXYGEN SATURATION: 96 % | BODY MASS INDEX: 28.26 KG/M2 | HEIGHT: 73 IN

## 2017-09-18 DIAGNOSIS — H93.13 TINNITUS, BILATERAL: ICD-10-CM

## 2017-09-18 DIAGNOSIS — E78.5 HYPERLIPIDEMIA, UNSPECIFIED HYPERLIPIDEMIA TYPE: ICD-10-CM

## 2017-09-18 DIAGNOSIS — Z72.0 TOBACCO ABUSE: ICD-10-CM

## 2017-09-18 DIAGNOSIS — Z12.11 SCREEN FOR COLON CANCER: ICD-10-CM

## 2017-09-18 DIAGNOSIS — M25.512 LEFT SHOULDER PAIN, UNSPECIFIED CHRONICITY: ICD-10-CM

## 2017-09-18 DIAGNOSIS — I10 ESSENTIAL HYPERTENSION: Primary | ICD-10-CM

## 2017-09-18 DIAGNOSIS — S09.90XA DEPRESSION DUE TO HEAD INJURY: ICD-10-CM

## 2017-09-18 DIAGNOSIS — Z23 ENCOUNTER FOR IMMUNIZATION: ICD-10-CM

## 2017-09-18 DIAGNOSIS — Z13.39 SCREENING FOR ALCOHOLISM: ICD-10-CM

## 2017-09-18 DIAGNOSIS — Z12.5 SCREENING FOR PROSTATE CANCER: ICD-10-CM

## 2017-09-18 DIAGNOSIS — F32.A DEPRESSION DUE TO HEAD INJURY: ICD-10-CM

## 2017-09-18 DIAGNOSIS — Z00.00 INITIAL MEDICARE ANNUAL WELLNESS VISIT: ICD-10-CM

## 2017-09-18 NOTE — MR AVS SNAPSHOT
Visit Information Date & Time Provider Department Dept. Phone Encounter #  
 9/18/2017  9:20 AM Jossie Lambert MD [address] [place], [place] [address]arlene Laporte [id number] Follow-up Instructions Return in about 6 months (around 3/18/2018), or if symptoms worsen or fail to improve. Your Appointments 12/12/2017  9:00 AM  
Follow Up with Elda Mendes MD  
Neurology [address], [id number] [facility] Appt Note: follow up TBI/head pain  $0CP  noel  7/11/17 [address] [address] [phone]  
  
   
 [address] [address] Upcoming Health Maintenance Date Due FOBT Q 1 YEAR AGE 50-75 12/10/2015 ZOSTER VACCINE AGE 60> 7/12/2017 INFLUENZA AGE 9 TO ADULT 8/1/2017 Pneumococcal 19-64 Medium Risk (1 of 1 - PPSV23) 1/18/2018* DTaP/Tdap/Td series (2 - Td) 2/3/2027 *Topic was postponed. The date shown is not the original due date. Allergies as of 9/18/2017  Review Complete On: 9/18/2017 By: Jossie Lambert MD  
 No Known Allergies Current Immunizations  Reviewed on 7/19/2012 Name Date Influenza Vaccine 9/6/2013 Influenza Vaccine (Quad) PF 11/28/2016, 11/17/2015, 11/25/2014 Tdap 2/3/2017 Not reviewed this visit You Were Diagnosed With   
  
 Codes Comments Essential hypertension    -  Primary ICD-10-CM: I10 
ICD-9-CM: 401.9 Hyperlipidemia, unspecified hyperlipidemia type     ICD-10-CM: E78.5 ICD-9-CM: 272.4 Left shoulder pain, unspecified chronicity     ICD-10-CM: M25.512 ICD-9-CM: 719.41 Tinnitus, bilateral     ICD-10-CM: H93.13 
ICD-9-CM: 388.30 Depression due to head injury     ICD-10-CM: F32.9, S09.90XA ICD-9-CM: 691, 959.01 Tobacco abuse     ICD-10-CM: Z72.0 ICD-9-CM: 305.1 Initial Medicare annual wellness visit     ICD-10-CM: Z00.00 ICD-9-CM: V70.0 Screening for alcoholism     ICD-10-CM: Z13.89 ICD-9-CM: V79.1 Screening for prostate cancer     ICD-10-CM: Z12.5 ICD-9-CM: V76.44 Screen for colon cancer     ICD-10-CM: Z12.11 ICD-9-CM: V76.51 Vitals BP Pulse Temp Resp Height(growth percentile) Weight(growth percentile) 150/90 64 98.4 °F (36.9 °C) (Oral) 20 6' 1\" (1.854 m) 213 lb 3.2 oz (96.7 kg) SpO2 BMI Smoking Status 96% 28.13 kg/m2 Current Every Day Smoker Vitals History BMI and BSA Data Body Mass Index Body Surface Area  
 28.13 kg/m 2 2.23 m 2 Preferred Pharmacy Pharmacy Name Phone [address] [place], [place] - [address] [phone] Your Updated Medication List  
  
   
This list is accurate as of: 9/18/17 11:10 AM.  Always use your most recent med list.  
  
  
  
  
 aspirin delayed-release 81 mg tablet Take  by mouth daily. carBAMazepine 100 mg chewable tablet Commonly known as:  TEGretol CHEW & SWALLOW 1 TABLET BY MOUTH 3 TIMES A DAY  
  
 cetirizine 10 mg tablet Commonly known as:  ZYRTEC Take 1 Tab by mouth daily. diclofenac EC 75 mg EC tablet Commonly known as:  VOLTAREN Take 1 Tab by mouth two (2) times a day. FLUoxetine 10 mg capsule Commonly known as:  PROzac TAKE ONE CAPSULE BY MOUTH EVERY DAY  
  
 losartan 100 mg tablet Commonly known as:  COZAAR  
TAKE ONE TABLET BY MOUTH DAILY  
  
 metoprolol tartrate 25 mg tablet Commonly known as:  LOPRESSOR  
TAKE 1/2 TABLET BY MOUTH TWICE DAILY  
  
 mupirocin 2 % ointment Commonly known as:  [facility] Apply  to affected area daily. pravastatin 10 mg tablet Commonly known as:  PRAVACHOL  
TAKE ONE TABLET BY MOUTH NIGHTLY  
  
 varicella zoster vacine live 19,400 unit/0.65 mL Susr injection Commonly known as:  varicella-zoster vacine live 1 Vial by SubCUTAneous route once for 1 dose. Prescriptions Printed  Refills  
 varicella zoster vacine live (VARICELLA-ZOSTER VACINE LIVE) 19,400 unit/0.65 mL susr injection 0 Si Vial by SubCUTAneous route once for 1 dose. Class: Print Route: SubCUTAneous We Performed the Following CBC W/O DIFF [30695 CPT(R)] LIPID PANEL [43130 CPT(R)] METABOLIC PANEL, COMPREHENSIVE [45960 CPT(R)] OCCULT BLOOD, IMMUNOASSAY (FIT) [id number] CPT(R)] SC ANNUAL ALCOHOL SCREEN 15 MIN [id number] [facility]] PSA SCREENING (SCREENING) [ [facility]] REFERRAL TO AUDIOLOGY [REF7 Custom] Comments:  
 Please evaluate patient with hx of head injury and chronic tinnitus. Jose Rafael Verdugo TSH 3RD GENERATION [98597 CPT(R)] Follow-up Instructions Return in about 6 months (around 3/18/2018), or if symptoms worsen or fail to improve. To-Do List   
 2017 Imaging:  XR SHOULDER LT AP/LAT MIN 2 V Referral Information Referral ID Referred By Referred To  
  
 [id number] [facility], [address] [place] [address],[address], AUD   
   [address] [address] Phone: [phone] Fax: [phone] Visits Status Start Date End Date 1 New Request 17 If your referral has a status of pending review or denied, additional information will be sent to support the outcome of this decision. Patient Instructions Medicare Wellness Visit, Male The best way to live healthy is to have a healthy lifestyle by eating a well-balanced diet, exercising regularly, limiting alcohol and stopping smoking. Regular physical exams and screening tests are another way to keep healthy. Preventive exams provided by your health care provider can find health problems before they become diseases or illnesses. Preventive services including immunizations, screening tests, monitoring and exams can help you take care of your own health. All people over age 72 should have a pneumovax  and and a prevnar shot to prevent pneumonia. These are once in a lifetime unless you and your provider decide differently. All people over 65 should have a yearly flu shot and a tetanus vaccine every 10 years. Screening for diabetes mellitus with a blood sugar test should be done every year. Glaucoma is a disease of the eye due to increased ocular pressure that can lead to blindness and it should be done every year by an eye professional. 
 
Cardiovascular screening tests that check for elevated lipids (fatty part of blood) which can lead to heart disease and strokes should be done every 5 years. Colorectal screening that evaluates for blood or polyps in your colon should be done yearly as a stool test or every five years as a flexible sigmoidoscope or every 10 years as a colonoscopy up to age 76. Men up to age 76 may need a screening blood test for prostate cancer at certain intervals, depending on their personal and family history. This decision is between the patient and his provider. If you have been a smoker or had family history of abdominal aortic aneurysms, you and your provider may decide to schedule an ultrasound test of your aorta. Hepatitis C screening is also recommended for anyone born between [birth year] through [address]. A shingles vaccine is also recommended once in a lifetime after age 61. Your Medicare Wellness Exam is recommended annually. Here is a list of your current Health Maintenance items with a due date: 
 
Health Maintenance Due Topic Date Due  Stool testing for trace blood  12/10/2015  Shingles Vaccine  07/12/2017  Flu Vaccine  08/01/2017 Advance Directives: Care Instructions Your Care Instructions An advance directive is a legal way to state your wishes at the end of your life. It tells your family and your doctor what to do if you can no longer say what you want. There are two main types of advance directives. You can change them any time that your wishes change. · A living will tells your family and your doctor your wishes about life support and other treatment. · A durable power of  for health care lets you name a person to make treatment decisions for you when you can't speak for yourself. This person is called a health care agent. If you do not have an advance directive, decisions about your medical care may be made by a doctor or a  who doesn't know you. It may help to think of an advance directive as a gift to the people who care for you. If you have one, they won't have to make tough decisions by themselves. Follow-up care is a key part of your treatment and safety. Be sure to make and go to all appointments, and call your doctor if you are having problems. It's also a good idea to know your test results and keep a list of the medicines you take. How can you care for yourself at home? · Discuss your wishes with your loved ones and your doctor. This way, there are no surprises. · Many states have a unique form. Or you might use a universal form that has been approved by many states. This kind of form can sometimes be completed and stored online. Your electronic copy will then be available wherever you have a connection to the Internet. In most cases, doctors will respect your wishes even if you have a form from a different state. · You don't need a  to do an advance directive. But you may want to get legal advice. · Think about these questions when you prepare an advance directive: ¨ Who do you want to make decisions about your medical care if you are not able to? Many people choose a family member or close friend. ¨ Do you know enough about life support methods that might be used? If not, talk to your doctor so you understand. ¨ What are you most afraid of that might happen? You might be afraid of having pain, losing your independence, or being kept alive by machines. ¨ Where would you prefer to die? Choices include your home, a hospital, or a nursing home.  
¨ Would you like to have information about hospice care to support you and your family? ¨ Do you want to donate organs when you die? ¨ Do you want certain Mosque practices performed before you die? If so, put your wishes in the advance directive. · Read your advance directive every year, and make changes as needed. When should you call for help? Be sure to contact your doctor if you have any questions. Where can you learn more? Go to http://jonatan-donna.info/. Enter R264 in the search box to learn more about \"Advance Directives: Care Instructions. \" Current as of: November 17, 2016 Content Version: 11.3 © 2247-3962 [employer]. Care instructions adapted under license by [employer] (which disclaims liability or warranty for this information). If you have questions about a medical condition or this instruction, always ask your healthcare professional. [address] any warranty or liability for your use of this information. Introducing \[facility] [place] [facility]\"" & HEALTH SERVICES! [employer]: 
Thank you for requesting a [employer] account. Our records indicate that you have previously registered for a [employer] account but its currently inactive. Please call our [employer] support line at [phone]. Additional Information If you have questions, please visit the Frequently Asked Questions section of the [employer] website at [email or web address] Remember, [employer] is NOT to be used for urgent needs. For medical emergencies, dial 911. Now available from your iPhone and Android! Please provide this summary of care documentation to your next provider. Your primary care clinician is listed as Nish Ramírez. If you have any questions after today's visit, please call [phone].

## 2017-09-18 NOTE — PROGRESS NOTES
Health Maintenance Due   Topic Date Due    Pneumococcal 19-64 Medium Risk (1 of 1 - PPSV23) 09/12/1976    FOBT Q 1 YEAR AGE 50-75  12/10/2015    ZOSTER VACCINE AGE 60>  07/12/2017    INFLUENZA AGE 9 TO ADULT  08/01/2017

## 2017-09-18 NOTE — PROGRESS NOTES
Progress Note    Patient: Cyndi Henao. MRN: [id number]  SSN: [id number]    YOB: 1955  Age: 63 y.o. Sex: male        Subjective:     Chief Complaint   Patient presents with    Annual Wellness Visit    Immunization/Injection    Shoulder Pain       BP Readings from Last 3 Encounters:   09/18/17 150/90   07/26/17 130/79   07/11/17 (!) 141/95       Wt Readings from Last 3 Encounters:   09/18/17 213 lb 3.2 oz (96.7 kg)   07/11/17 209 lb 12.8 oz (95.2 kg)   04/11/17 205 lb (93 kg)     Body mass index is 28.13 kg/(m^2). HPI: he is a 63y.o. year old male who presents for follow up. Patient with dx of HTN and HLD. Patient with hx of skull fracture with resultant depression, neurogenic pain and hearing loss. He continues to smoke. Patient now with Medicare. He complains of continued left shoulder pain and decreased ROM s/p fall 2 months ago. Encounter Diagnoses   Name Primary?  Essential hypertension Yes    Hyperlipidemia, unspecified hyperlipidemia type     Left shoulder pain, unspecified chronicity     Tinnitus, bilateral     Depression due to head injury     Tobacco abuse      BP Readings from Last 3 Encounters:   09/18/17 150/90   07/26/17 130/79   07/11/17 (!) 141/95     Wt Readings from Last 3 Encounters:   09/18/17 213 lb 3.2 oz (96.7 kg)   07/11/17 209 lb 12.8 oz (95.2 kg)   04/11/17 205 lb (93 kg)     Body mass index is 28.13 kg/(m^2). Current and past medical information:    Current Medications after this visit[de-identified]     Current Outpatient Prescriptions   Medication Sig    pneumococcal 23-valent (PNEUMOVAX 23) 25 mcg/0.5 mL injection 0.5 mL by IntraMUSCular route once for 1 dose.  varicella zoster vacine live (VARICELLA-ZOSTER VACINE LIVE) 19,400 unit/0.65 mL susr injection 1 Vial by SubCUTAneous route once for 1 dose.     carBAMazepine (TEGRETOL) 100 mg chewable tablet CHEW & SWALLOW 1 TABLET BY MOUTH 3 TIMES A DAY    FLUoxetine (PROZAC) 10 mg capsule TAKE ONE CAPSULE BY MOUTH EVERY DAY    losartan (COZAAR) 100 mg tablet TAKE ONE TABLET BY MOUTH DAILY    mupirocin (BACTROBAN) 2 % ointment Apply  to affected area daily.  diclofenac EC (VOLTAREN) 75 mg EC tablet Take 1 Tab by mouth two (2) times a day.  metoprolol tartrate (LOPRESSOR) 25 mg tablet TAKE 1/2 TABLET BY MOUTH TWICE DAILY    pravastatin (PRAVACHOL) 10 mg tablet TAKE ONE TABLET BY MOUTH NIGHTLY    aspirin delayed-release 81 mg tablet Take  by mouth daily.  cetirizine (ZYRTEC) 10 mg tablet Take 1 Tab by mouth daily. No current facility-administered medications for this visit. Patient Active Problem List    Diagnosis Date Noted    Skull fracture ([facility] [address] 06/11/2014    Depression due to head injury 06/11/2014    Allergic arthritis involving hand 06/11/2014    Neurogenic pain 04/04/2014    Hearing loss of both ears 09/23/2013    Hyperlipidemia 02/19/2013    Back pain 07/19/2012    Chronic SI joint pain 07/19/2012    Chest pain, unspecified 04/17/2012    Hand pain 02/27/2012    Tobacco abuse 02/27/2012    HTN (hypertension) 02/27/2012    Tinnitus 02/27/2012       Past Medical History:   Diagnosis Date    H/O seasonal allergies     Hypertension        No Known Allergies    History reviewed. No pertinent surgical history.     Social History     Social History    Marital status: SINGLE     Spouse name: N/A    Number of children: N/A    Years of education: N/A     Social History Main Topics    Smoking status: Current Every Day Smoker     Packs/day: 1.00     Types: Cigarettes    Smokeless tobacco: Never Used    Alcohol use No    Drug use: No    Sexual activity: Not Asked     Other Topics Concern    None     Social History Narrative         Objective:     Review of Systems:  Constitutional: Negative for fatigue or malaise  Derm: Negative for rash or lesion  HEENT: Negative for acute hearing or vision changes  Cardiovascular: Negative for dizziness, chest pain or palpitations  Respiratory: Negative for cough, wheezing or SOB  Gastreintestinal: Negative for N/V/D/C  Genital/urinary: Negative for dysuria or voiding dysfunction  Muscoloskeletal: Negative for myalgias or arthralgias   Neurological: Negative for headache, weakness or paresthesia  Psychological: Negative for depression or anxiety      Vitals:    09/18/17 0919   BP: 150/90   Pulse: 64   Resp: 20   Temp: 98.4 °F (36.9 °C)   TempSrc: Oral   SpO2: 96%   Weight: 213 lb 3.2 oz (96.7 kg)   Height: 6' 1\" (1.854 m)      Body mass index is 28.13 kg/(m^2). Physical Exam:  Constitutional: well developed, well nourished, in no acute distress  Skin: warm and dry, normal tone and turgor  Head: normocephalic, atraumatic  Eyes: sclera clear, EOMI, PERRL  Neck: normal range of motion  CV: normal S1, S2, regular rate and rhythm  Respiratory: clear to auscultation bilaterally with symmetrical effort  Abdomen: soft, BS normal, no hernia appreciated  Extremities: full range of motion  Neurology: normal strength and sensation  Psych: active, alert and oriented, affect appropriate     Health Maintenance Due   Topic Date Due    FOBT Q 1 YEAR AGE 50-75  12/10/2015    ZOSTER VACCINE AGE 60>  07/12/2017    INFLUENZA AGE 9 TO ADULT  08/01/2017       Risk, benefits and potential costs of recommended health maintenance discussed. Patient expressed understanding and deferred at this time. Assessment and orders:       ICD-10-CM ICD-9-CM    1. Essential hypertension K77 174.8 METABOLIC PANEL, COMPREHENSIVE      CBC W/O DIFF      TSH 3RD GENERATION   2. Hyperlipidemia, unspecified hyperlipidemia type E78.5 272.4 LIPID PANEL      METABOLIC PANEL, COMPREHENSIVE   3. Left shoulder pain, unspecified chronicity M25.512 719.41 XR SHOULDER LT AP/LAT MIN 2 V   4. Tinnitus, bilateral H93.13 388.30 REFERRAL TO AUDIOLOGY   5. Depression due to head injury F32.9 311     S09.90XA 959.01    6.  Tobacco abuse Z72.0 305.1          Plan of care:  Discussed diagnoses in detail with patient. Strongly advised patient to stop all use of tobacco products. Medication risks/benefits/side effects discussed with patient. All of the patient's questions were addressed. The patient understands and agrees with our plan of care. The patient knows to call back if they are unsure of or forget any changes we discussed today or if the symptoms change. The patient received an After-Visit Summary which contains VS, orders, medication list and allergy list.     Patient Care Team:  Katrin Jernigan MD as PCP - General (Family Practice)  Selene Fulton MD (Rheumatology)  Birdie Baugh MD (Otolaryngology)  Samira Berry MD (Cardiology)  Pepito Allan MD (Neurology)    Follow-up Disposition:  Return in about 6 months (around 3/18/2018), or if symptoms worsen or fail to improve. Future Appointments  Date Time Provider Vanessa Gutierrez   12/12/2017 9:00 AM Pepito Allan MD [facility] [address]       Signed By: Katrin Jernigan MD     September 18, 2017        The following Inital Medicare Wellness Exam is distinct and separate from the medical evaluation and decision making. This is an Initial Medicare Annual Wellness Exam (AWV) (Performed 12 months after IPPE or effective date of Medicare Part B enrollment, Once in a lifetime)    I have reviewed the patient's medical history in detail and updated the computerized patient record. History     Past Medical History:   Diagnosis Date    H/O seasonal allergies     Hypertension       History reviewed. No pertinent surgical history. Current Outpatient Prescriptions   Medication Sig Dispense Refill    pneumococcal 23-valent (PNEUMOVAX 23) 25 mcg/0.5 mL injection 0.5 mL by IntraMUSCular route once for 1 dose. 0.5 mL 0    varicella zoster vacine live (VARICELLA-ZOSTER VACINE LIVE) 19,400 unit/0.65 mL susr injection 1 Vial by SubCUTAneous route once for 1 dose.  0.65 mL 0    carBAMazepine (TEGRETOL) 100 mg chewable tablet CHEW & SWALLOW 1 TABLET BY MOUTH 3 TIMES A DAY 90 Tab 3    FLUoxetine (PROZAC) 10 mg capsule TAKE ONE CAPSULE BY MOUTH EVERY DAY 30 Cap 3    losartan (COZAAR) 100 mg tablet TAKE ONE TABLET BY MOUTH DAILY 30 Tab 3    mupirocin (BACTROBAN) 2 % ointment Apply  to affected area daily. 22 g 0    diclofenac EC (VOLTAREN) 75 mg EC tablet Take 1 Tab by mouth two (2) times a day. 30 Tab 0    metoprolol tartrate (LOPRESSOR) 25 mg tablet TAKE 1/2 TABLET BY MOUTH TWICE DAILY 30 Tab 5    pravastatin (PRAVACHOL) 10 mg tablet TAKE ONE TABLET BY MOUTH NIGHTLY 30 Tab 5    aspirin delayed-release 81 mg tablet Take  by mouth daily.  cetirizine (ZYRTEC) 10 mg tablet Take 1 Tab by mouth daily. 90 Tab 3     No Known Allergies  Family History   Problem Relation Age of Onset    Family history unknown: Yes     Social History   Substance Use Topics    Smoking status: Current Every Day Smoker     Packs/day: 1.00     Types: Cigarettes    Smokeless tobacco: Never Used    Alcohol use No     Patient Active Problem List   Diagnosis Code    Hand pain M79.643    Tobacco abuse Z72.0    HTN (hypertension) I10    Tinnitus H93.19    Chest pain, unspecified R07.9    Back pain M54.9    Chronic SI joint pain M53.3, G89.29    Hyperlipidemia E78.5    Hearing loss of both ears H91.93    Neurogenic pain M79.2    Skull fracture (HCC) S02. 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       Depression Risk Factor Screening:     PHQ over the last two weeks 9/18/2017   Little interest or pleasure in doing things Not at all   Feeling down, depressed or hopeless Not at all   Total Score PHQ 2 0     Alcohol Risk Factor Screening: You do not drink alcohol or very rarely. Functional Ability and Level of Safety:     Hearing Loss  Hearing is good. The patient needs further evaluation.     Activities of Daily Living  The home contains: no safety equipment  Patient does total self care    Fall Risk  Fall Risk Assessment, last 12 mths 2/8/2017   Able to walk? Yes   Fall in past 12 months? Yes   Fall with injury? Yes   Number of falls in past 12 months 2   Fall Risk Score 3       Abuse Screen  Patient is not abused    Cognitive Screening   Evaluation of Cognitive Function:  Has your family/caregiver stated any concerns about your memory: yes  Patient followed by neurology for hx of TBI. Patient Care Team   Patient Care Team:  Woody Koroma MD as PCP - General (Family Practice)  Tashia Ferrer MD (Rheumatology)  Radha Aguilar MD (Otolaryngology)  Sanjeev Sutton MD (Cardiology)  Alonso Goldberg MD (Neurology)    Assessment/Plan   Education and counseling provided:  Are appropriate based on today's review and evaluation  End-of-Life planning (with patient's consent)  Pneumococcal Vaccine  Influenza Vaccine  Prostate cancer screening tests (PSA, covered annually)  Colorectal cancer screening tests    Diagnoses and all orders for this visit:    1. Initial Medicare annual wellness visit  -     pneumococcal 23-valent (PNEUMOVAX 23) 25 mcg/0.5 mL injection; 0.5 mL by IntraMUSCular route once for 1 dose. 2. Screening for alcoholism  -     Annual  Alcohol Screen 15 min ()    3. Screening for prostate cancer  -     PSA SCREENING (SCREENING) ()    4. Screen for colon cancer  -     OCCULT BLOOD, IMMUNOASSAY (FIT)    Other orders  -     varicella zoster vacine live (VARICELLA-ZOSTER VACINE LIVE) 19,400 unit/0.65 mL susr injection; 1 Vial by SubCUTAneous route once for 1 dose. Health Maintenance Due   Topic Date Due    FOBT Q 1 YEAR AGE 50-75  12/10/2015   Ordered today.     ZOSTER VACCINE AGE 60>  07/12/2017   Ordered today    INFLUENZA AGE 9 TO ADULT  08/01/2017   Ordered today

## 2017-09-18 NOTE — PATIENT INSTRUCTIONS
Medicare Wellness Visit, Male    The best way to live healthy is to have a healthy lifestyle by eating a well-balanced diet, exercising regularly, limiting alcohol and stopping smoking. Regular physical exams and screening tests are another way to keep healthy. Preventive exams provided by your health care provider can find health problems before they become diseases or illnesses. Preventive services including immunizations, screening tests, monitoring and exams can help you take care of your own health. All people over age 72 should have a pneumovax  and and a prevnar shot to prevent pneumonia. These are once in a lifetime unless you and your provider decide differently. All people over 65 should have a yearly flu shot and a tetanus vaccine every 10 years. Screening for diabetes mellitus with a blood sugar test should be done every year. Glaucoma is a disease of the eye due to increased ocular pressure that can lead to blindness and it should be done every year by an eye professional.    Cardiovascular screening tests that check for elevated lipids (fatty part of blood) which can lead to heart disease and strokes should be done every 5 years. Colorectal screening that evaluates for blood or polyps in your colon should be done yearly as a stool test or every five years as a flexible sigmoidoscope or every 10 years as a colonoscopy up to age 76. Men up to age 76 may need a screening blood test for prostate cancer at certain intervals, depending on their personal and family history. This decision is between the patient and his provider. If you have been a smoker or had family history of abdominal aortic aneurysms, you and your provider may decide to schedule an ultrasound test of your aorta. Hepatitis C screening is also recommended for anyone born between [birth year] through [address]. A shingles vaccine is also recommended once in a lifetime after age 61.     Your Medicare Wellness Exam is recommended annually. Here is a list of your current Health Maintenance items with a due date:    Health Maintenance Due   Topic Date Due    Stool testing for trace blood  12/10/2015    Shingles Vaccine  07/12/2017    Flu Vaccine  08/01/2017          Advance Directives: Care Instructions  Your Care Instructions  An advance directive is a legal way to state your wishes at the end of your life. It tells your family and your doctor what to do if you can no longer say what you want. There are two main types of advance directives. You can change them any time that your wishes change. · A living will tells your family and your doctor your wishes about life support and other treatment. · A durable power of  for health care lets you name a person to make treatment decisions for you when you can't speak for yourself. This person is called a health care agent. If you do not have an advance directive, decisions about your medical care may be made by a doctor or a  who doesn't know you. It may help to think of an advance directive as a gift to the people who care for you. If you have one, they won't have to make tough decisions by themselves. Follow-up care is a key part of your treatment and safety. Be sure to make and go to all appointments, and call your doctor if you are having problems. It's also a good idea to know your test results and keep a list of the medicines you take. How can you care for yourself at home? · Discuss your wishes with your loved ones and your doctor. This way, there are no surprises. · Many states have a unique form. Or you might use a universal form that has been approved by many states. This kind of form can sometimes be completed and stored online. Your electronic copy will then be available wherever you have a connection to the Internet. In most cases, doctors will respect your wishes even if you have a form from a different state.   · You don't need a  to do an advance directive. But you may want to get legal advice. · Think about these questions when you prepare an advance directive:  ¨ Who do you want to make decisions about your medical care if you are not able to? Many people choose a family member or close friend. ¨ Do you know enough about life support methods that might be used? If not, talk to your doctor so you understand. ¨ What are you most afraid of that might happen? You might be afraid of having pain, losing your independence, or being kept alive by machines. ¨ Where would you prefer to die? Choices include your home, a hospital, or a nursing home. ¨ Would you like to have information about hospice care to support you and your family? ¨ Do you want to donate organs when you die? ¨ Do you want certain [employer] practices performed before you die? If so, put your wishes in the advance directive. · Read your advance directive every year, and make changes as needed. When should you call for help? Be sure to contact your doctor if you have any questions. Where can you learn more? Go to http://jonatan-donna.info/. Enter R264 in the search box to learn more about \"Advance Directives: Care Instructions. \"  Current as of: November 17, 2016  Content Version: 11.3  © 9921-6247 [employer]. Care instructions adapted under license by [employer] (which disclaims liability or warranty for this information). If you have questions about a medical condition or this instruction, always ask your healthcare professional. [place] [address] any warranty or liability for your use of this information.

## 2017-09-19 LAB
ALBUMIN SERPL-MCNC: 4.2 G/DL (ref 3.6–4.8)
ALBUMIN/GLOB SERPL: 2 {RATIO} (ref 1.2–2.2)
ALP SERPL-CCNC: 108 IU/L (ref 39–117)
ALT SERPL-CCNC: 9 IU/L (ref 0–44)
AST SERPL-CCNC: 13 IU/L (ref 0–40)
BILIRUB SERPL-MCNC: 0.3 MG/DL (ref 0–1.2)
BUN SERPL-MCNC: 15 MG/DL (ref 8–27)
BUN/CREAT SERPL: 15 (ref 10–24)
CALCIUM SERPL-MCNC: 8.3 MG/DL (ref 8.6–10.2)
CHLORIDE SERPL-SCNC: 101 MMOL/L (ref 96–106)
CHOLEST SERPL-MCNC: 187 MG/DL (ref 100–199)
CO2 SERPL-SCNC: 25 MMOL/L (ref 18–29)
CREAT SERPL-MCNC: 0.98 MG/DL (ref 0.76–1.27)
ERYTHROCYTE [DISTWIDTH] IN BLOOD BY AUTOMATED COUNT: 13.1 % (ref 12.3–15.4)
GLOBULIN SER CALC-MCNC: 2.1 G/DL (ref 1.5–4.5)
GLUCOSE SERPL-MCNC: 93 MG/DL (ref 65–99)
HCT VFR BLD AUTO: 43.3 % (ref 37.5–51)
HDLC SERPL-MCNC: 50 MG/DL
HGB BLD-MCNC: 14.6 G/DL (ref 12.6–17.7)
LDLC SERPL CALC-MCNC: 109 MG/DL (ref 0–99)
MCH RBC QN AUTO: 31.7 PG (ref 26.6–33)
MCHC RBC AUTO-ENTMCNC: 33.7 G/DL (ref 31.5–35.7)
MCV RBC AUTO: 94 FL (ref 79–97)
PLATELET # BLD AUTO: 209 X10E3/UL (ref 150–379)
POTASSIUM SERPL-SCNC: 5 MMOL/L (ref 3.5–5.2)
PROT SERPL-MCNC: 6.3 G/DL (ref 6–8.5)
PSA SERPL-MCNC: 0.6 NG/ML (ref 0–4)
RBC # BLD AUTO: 4.61 X10E6/UL (ref 4.14–5.8)
SODIUM SERPL-SCNC: 140 MMOL/L (ref 134–144)
TRIGL SERPL-MCNC: 142 MG/DL (ref 0–149)
TSH SERPL DL<=0.005 MIU/L-ACNC: 1.9 UIU/ML (ref 0.45–4.5)
VLDLC SERPL CALC-MCNC: 28 MG/DL (ref 5–40)
WBC # BLD AUTO: 5.2 X10E3/UL (ref 3.4–10.8)

## 2017-09-22 DIAGNOSIS — S09.90XA DEPRESSION DUE TO HEAD INJURY: ICD-10-CM

## 2017-09-22 DIAGNOSIS — F32.A DEPRESSION DUE TO HEAD INJURY: ICD-10-CM

## 2017-09-22 RX ORDER — FLUOXETINE 10 MG/1
CAPSULE ORAL
Qty: 30 CAP | Refills: 3 | Status: SHIPPED | OUTPATIENT
Start: 2017-09-22 | End: 2018-01-23 | Stop reason: SDUPTHER

## 2017-09-29 DIAGNOSIS — I10 ESSENTIAL HYPERTENSION WITH GOAL BLOOD PRESSURE LESS THAN 140/90: ICD-10-CM

## 2017-10-01 RX ORDER — LOSARTAN POTASSIUM 100 MG/1
TABLET ORAL
Qty: 30 TAB | Refills: 3 | Status: SHIPPED | OUTPATIENT
Start: 2017-10-01 | End: 2018-05-02 | Stop reason: SDUPTHER

## 2017-11-24 RX ORDER — PRAVASTATIN SODIUM 10 MG/1
TABLET ORAL
Qty: 30 TAB | Refills: 5 | Status: SHIPPED | OUTPATIENT
Start: 2017-11-24 | End: 2018-11-20 | Stop reason: SDUPTHER

## 2017-12-12 ENCOUNTER — OFFICE VISIT (OUTPATIENT)
Dept: NEUROLOGY | Age: 62
End: 2017-12-12

## 2017-12-12 VITALS
HEART RATE: 56 BPM | SYSTOLIC BLOOD PRESSURE: 124 MMHG | DIASTOLIC BLOOD PRESSURE: 81 MMHG | WEIGHT: 212.2 LBS | BODY MASS INDEX: 28 KG/M2

## 2017-12-12 DIAGNOSIS — S06.9X9D TRAUMATIC BRAIN INJURY WITH LOSS OF CONSCIOUSNESS, SUBSEQUENT ENCOUNTER: ICD-10-CM

## 2017-12-12 DIAGNOSIS — R56.9 SEIZURE (HCC): Primary | ICD-10-CM

## 2017-12-12 NOTE — PATIENT INSTRUCTIONS
[facility]   Statement to Patients  April 1, 2014      In an effort to ensure the large volume of patient prescription refills is processed in the most efficient and expeditious manner, we are asking our patients to assist us by calling your Pharmacy for all prescription refills, this will include also your  Mail Order Pharmacy. The pharmacy will contact our office electronically to continue the refill process. Please do not wait until the last minute to call your pharmacy. We need at least 48 hours (2days) to fill prescriptions. We also encourage you to call your pharmacy before going to  your prescription to make sure it is ready. With regard to controlled substance prescription refill requests (narcotic refills) that need to be picked up at our office, we ask your cooperation by providing us with at least 72 hours (3days) notice that you will need a refill. We will not refill narcotic prescription refill requests after 4:00pm on any weekday, Monday through Thursday, or after 2:00pm on Fridays, or on the weekends. We encourage everyone to explore another way of getting your prescription refill request processed using [employer], our patient web portal through our electronic medical record system. [employer] is an efficient and effective way to communicate your medication request directly to the office and  downloadable as an pam on your smart phone . [employer] also features a review functionality that allows you to view your medication list as well as leave messages for your physician. Are you ready to get connected? If so please review the attatched instructions or speak to any of our staff to get you set up right away! Thank you so much for your cooperation. Should you have any questions please contact our Practice Administrator.     The Physicians and Staff,  [facility]

## 2017-12-12 NOTE — PROGRESS NOTES
Neurology Progress Note    Patient ID:  Vitor Winslow  [id number]  70 y.o.  1955      Subjective:   History:  Vitor Elena is a 63 y.o. male who  has a past medical history of H/O seasonal allergies and Hypertension. 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 (-) helmet (+) confused. Patient was brought to [facility] and found to have a skull fracture with note of weakness of the L arm consistent with traumatic brain injury (TBI). CT head in 2014 did show healed left temporal bone fracture and focal right temporal lobe encephalomalacia. Patient was discharged on unknown seizure meds. 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 Since then, patient will have episodes of \"blacking out\" every 2-3 months, described as confused with blank stares and mumbling concerning for complex partial seizure with secondary generalization. Neurologic exam revealed a spatic L gait which can explain his gait and balance issues.     Patient saw Dr John Pastor 3 months ago who recommended neuropsychological testing for his cognitive complaints (unable to tolerate crowded area like in [place], loss of interest), but patient did not go. Also did not go for physical therapy for his gait issue citing financial constraints.       ROS:  Per HPI-  Otherwise the remainder of ROS was negative    Social Hx  Social History     Social History    Marital status: SINGLE     Spouse name: N/A    Number of children: N/A    Years of education: N/A     Social History Main Topics    Smoking status: Current Every Day Smoker     Packs/day: 1.00     Types: Cigarettes    Smokeless tobacco: Never Used    Alcohol use No    Drug use: No    Sexual activity: Not Asked     Other Topics Concern    None     Social History Narrative       Meds:  Current Outpatient Prescriptions on File Prior to Visit   Medication Sig Dispense Refill    pravastatin (PRAVACHOL) 10 mg tablet TAKE ONE TABLET BY MOUTH NIGHTLY 30 Tab 5    losartan (COZAAR) 100 mg tablet TAKE ONE TABLET BY MOUTH DAILY 30 Tab 3    FLUoxetine (PROZAC) 10 mg capsule TAKE ONE CAPSULE BY MOUTH EVERY DAY 30 Cap 3    carBAMazepine (TEGRETOL) 100 mg chewable tablet CHEW & SWALLOW 1 TABLET BY MOUTH 3 TIMES A DAY 90 Tab 3    metoprolol tartrate (LOPRESSOR) 25 mg tablet TAKE 1/2 TABLET BY MOUTH TWICE DAILY 30 Tab 5    aspirin delayed-release 81 mg tablet Take  by mouth daily.  cetirizine (ZYRTEC) 10 mg tablet Take 1 Tab by mouth daily. 90 Tab 3    mupirocin (BACTROBAN) 2 % ointment Apply  to affected area daily. 22 g 0    diclofenac EC (VOLTAREN) 75 mg EC tablet Take 1 Tab by mouth two (2) times a day. 30 Tab 0     No current facility-administered medications on file prior to visit. Imaging:    CT Results (recent):    Results from Hospital Encounter encounter on 09/17/14   CT HEAD W WO CONT   Narrative **Final Report**      ICD Codes / Adm. Diagnosis: 803.00  477.9 / Other closed skull fracture wi    Examination:  CT HEAD W AND WO CON  - [id number] - Sep 17 2014  1:54PM  Accession No:  [id number]  Reason:  sequelae from skull fracture      REPORT:  INDICATION:   sequelae from skull fracture    EXAM:  HEAD CT WITH AND WITHOUT CONTRAST    COMPARISON:  March 22, 2013    TECHNIQUE:  Axial head CT was performed both before and after the uneventful   administration of 100 CC Optiray 320. FINDINGS:    The ventricles are midline without hydrocephalus. There is no acute intra   or extra-axial fluid hemorrhage. There is a focal area of encephalomalacia   in the lateral aspect of the right temporal lobe. The basal cisterns are   patent. The paranasal sinuses are clear. There is no abnormal parenchymal or   meningeal enhancement. No acute skull fracture identified. There is a   corticated lucency in the left temporal bone anterior to the mastoid which   could represent healed fracture. Mastoid air cells and paranasal sinuses are   clear. There is no significant soft tissue swelling. IMPRESSION:    No acute abnormality. Suspect healed left temporal bone fracture and focal   right temporal lobe encephalomalacia. No abnormal enhancement. Signing/Reading Doctor: Cristal Stephenson ([id number])    Approved: Cristal Stephenson ([id number])  Sep 17 2014  2:55PM                                     MRI Results (recent):  No results found for this or any previous visit. IR Results (recent):  No results found for this or any previous visit. VAS/US Results (recent):  No results found for this or any previous visit. Reviewed records in [facility] tab today    Lab Review     Office Visit on 09/18/2017   Component Date Value Ref Range Status    Cholesterol, total 09/18/2017 187  100 - 199 mg/dL Final    Triglyceride 09/18/2017 142  0 - 149 mg/dL Final    HDL Cholesterol 09/18/2017 50  >39 mg/dL Final    VLDL, calculated 09/18/2017 28  5 - 40 mg/dL Final    LDL, calculated 09/18/2017 109* 0 - 99 mg/dL Final    Glucose 09/18/2017 93  65 - 99 mg/dL Final    BUN 09/18/2017 15  8 - 27 mg/dL Final    Creatinine 09/18/2017 0.98  0.76 - 1.27 mg/dL Final    GFR est non-AA 09/18/2017 83  >59 mL/min/1.73 Final    GFR est AA 09/18/2017 96  >59 mL/min/1.73 Final    BUN/Creatinine ratio 09/18/2017 15  10 - 24 Final    Sodium 09/18/2017 140  134 - 144 mmol/L Final    Potassium 09/18/2017 5.0  3.5 - 5.2 mmol/L Final    Chloride 09/18/2017 101  96 - 106 mmol/L Final    CO2 09/18/2017 25  18 - 29 mmol/L Final    Calcium 09/18/2017 8.3* 8.6 - 10.2 mg/dL Final    Protein, total 09/18/2017 6.3  6.0 - 8.5 g/dL Final    Albumin 09/18/2017 4.2  3.6 - 4.8 g/dL Final    GLOBULIN, TOTAL 09/18/2017 2.1  1.5 - 4.5 g/dL Final    A-G Ratio 09/18/2017 2.0  1.2 - 2.2 Final    Bilirubin, total 09/18/2017 0.3  0.0 - 1.2 mg/dL Final    Alk.  phosphatase 09/18/2017 108  39 - 117 IU/L Final    AST (SGOT) 09/18/2017 13  0 - 40 IU/L Final    ALT (SGPT) 09/18/2017 9  0 - 44 IU/L Final    WBC 09/18/2017 5.2  3.4 - 10.8 x10E3/uL Final    RBC 09/18/2017 4.61  4.14 - 5.80 x10E6/uL Final    HGB 09/18/2017 14.6  12.6 - 17.7 g/dL Final    HCT 09/18/2017 43.3  37.5 - 51.0 % Final    MCV 09/18/2017 94  79 - 97 fL Final    MCH 09/18/2017 31.7  26.6 - 33.0 pg Final    MCHC 09/18/2017 33.7  31.5 - 35.7 g/dL Final    RDW 09/18/2017 13.1  12.3 - 15.4 % Final    PLATELET 36/91/0856 182  150 - 379 x10E3/uL Final    TSH 09/18/2017 1.900  0.450 - 4.500 uIU/mL Final    Prostate Specific Ag 09/18/2017 0.6  0.0 - 4.0 ng/mL Final    Comment: Roche ECLIA methodology. According to the American Urological Association, Serum PSA should  decrease and remain at undetectable levels after radical  prostatectomy. The AUA defines biochemical recurrence as an initial  PSA value 0.2 ng/mL or greater followed by a subsequent confirmatory  PSA value 0.2 ng/mL or greater. Values obtained with different assay methods or kits cannot be used  interchangeably. Results cannot be interpreted as absolute evidence  of the presence or absence of malignant disease. Objective:       Exam:  Visit Vitals    /81    Pulse (!) 56    Wt 212 lb 3.2 oz (96.3 kg)    BMI 28 kg/m2     Gen: Awake, alert, follows commands  Appropriate appearance, normal speech. Oriented to all spheres. No visual field defect on confrontation exam.  Full eyes movement, with no nystagmus, no diplopia, no ptosis. Normal gag and swallow. All remaining cranial nerves were normal  Motor: (+) dec L finger and L foot tapping  Sensory: intact to LT, PP, vibration, and JPS  Reflexes: 2+ throughout; Down going toes  Coordination: Good FTN and HTS, Romberg negative  Gait: Drags his L side with spatic gait    Assessment:       ICD-10-CM ICD-9-CM    1. Seizure (HCC) R56.9 780.39 REFERRAL TO NEUROPSYCHOLOGY      CARBAMAZEPINE   2.  Traumatic brain injury with loss of consciousness, subsequent encounter S06. 9X9D V58.89 REFERRAL TO NEUROPSYCHOLOGY     854.06 CARBAMAZEPINE           Plan:   1. Patient expressed frustration that people are ignoring his cognitive and gait issues. I had a long discussion with patient and pointed out that in the past, neuropsychological testing and physical therapy were ordered, but it was him who did not follow through with the recommendations  2. Will check Tegretol level. Patient to call for results. Will continue Tegretol 100 mg TID for now  3. Advise to talk to PCP to discuss adjustment of his fluoxetine for his mood issues      Follow-up Disposition:  Return in about 6 months (around 6/12/2018).           Toya Nunez MD  Diplomate, American Board of Psychiatry and Neurology  Diplomate, Neuromuscular Medicine  Diplomate, American Board of Electrodiagnostic Medicine

## 2017-12-12 NOTE — LETTER
12/12/2017 10:20 AM 
 
Patient:  Chantale Eric. YOB: 1955 Date of Visit: 12/12/2017 Dear Kaylynn Evans MD 
[address] [place] [phone] VIA In Basket 
 : Thank you for referring Mr. Yue Singletary to me for evaluation/treatment. Below are the relevant portions of my assessment and plan of care. If you have questions, please do not hesitate to call me. I look forward to following Mr. Wes Malhotra along with you. Sincerely, Bernice Arellano MD

## 2017-12-12 NOTE — MR AVS SNAPSHOT
Visit Information Date & Time Provider Department Dept. Phone Encounter #  
 12/12/2017  9:00 AM Renetta Virk MD Neurology [address] [facility],  [id number] [phone] [id number] Follow-up Instructions Return in about 6 months (around 6/12/2018). Upcoming Health Maintenance Date Due FOBT Q 1 YEAR AGE 50-75 12/10/2015 ZOSTER VACCINE AGE 60> 7/12/2017 DTaP/Tdap/Td series (2 - Td) 2/3/2027 Allergies as of 12/12/2017  Review Complete On: 12/12/2017 By: Eliodoro Gaucher, LPN No Known Allergies Current Immunizations  Reviewed on 10/26/2017 Name Date Influenza High Dose Vaccine PF 9/18/2017 Influenza Vaccine 9/6/2013 Influenza Vaccine (Quad) PF 11/28/2016, 11/17/2015, 11/25/2014 Pneumococcal Polysaccharide (PPSV-23) 10/26/2017, 9/18/2017 Tdap 2/3/2017 Not reviewed this visit You Were Diagnosed With   
  
 Codes Comments [facility]    -  Primary ICD-10-CM: R56.9 ICD-9-CM: 780.39 Traumatic brain injury with loss of consciousness, subsequent encounter     ICD-10-CM: S06.9X9D 
ICD-9-CM: V58.89, 854.06 Vitals BP Pulse Weight(growth percentile) BMI Smoking Status 124/81 (!) 56 212 lb 3.2 oz (96.3 kg) 28 kg/m2 Current Every Day Smoker BMI and BSA Data Body Mass Index Body Surface Area  
 28 kg/m 2 2.23 m 2 Preferred Pharmacy Pharmacy Name Phone [address] [place], [place] - [address] [phone] Your Updated Medication List  
  
   
This list is accurate as of: 12/12/17  9:49 AM.  Always use your most recent med list.  
  
  
  
  
 aspirin delayed-release 81 mg tablet Take  by mouth daily. carBAMazepine 100 mg chewable tablet Commonly known as:  TEGretol CHEW & SWALLOW 1 TABLET BY MOUTH 3 TIMES A DAY  
  
 cetirizine 10 mg tablet Commonly known as:  ZYRTEC Take 1 Tab by mouth daily. diclofenac EC 75 mg EC tablet Commonly known as:  VOLTAREN Take 1 Tab by mouth two (2) times a day. FLUoxetine 10 mg capsule Commonly known as:  PROzac TAKE ONE CAPSULE BY MOUTH EVERY DAY  
  
 losartan 100 mg tablet Commonly known as:  COZAAR  
TAKE ONE TABLET BY MOUTH DAILY  
  
 metoprolol tartrate 25 mg tablet Commonly known as:  LOPRESSOR  
TAKE 1/2 TABLET BY MOUTH TWICE DAILY  
  
 mupirocin 2 % ointment Commonly known as:  [facility] Apply  to affected area daily. pravastatin 10 mg tablet Commonly known as:  PRAVACHOL  
TAKE ONE TABLET BY MOUTH NIGHTLY We Performed the Following CARBAMAZEPINE [id number] CPT(R)] REFERRAL TO NEUROPSYCHOLOGY [KPD83 Custom] Follow-up Instructions Return in about 6 months (around 6/12/2018). Referral Information Referral ID Referred By Referred To  
  
 [id number] [facility], [address] [facility] [address] [facility] [address] [facility], [address] Phone: [phone] Fax: [phone] Visits Status Start Date End Date 1 New Request 12/12/17 12/12/18 If your referral has a status of pending review or denied, additional information will be sent to support the outcome of this decision. Patient Instructions PRESCRIPTION REFILL POLICY [facility] Statement to Patients April 1, 2014 In an effort to ensure the large volume of patient prescription refills is processed in the most efficient and expeditious manner, we are asking our patients to assist us by calling your Pharmacy for all prescription refills, this will include also your  Mail Order Pharmacy. The pharmacy will contact our office electronically to continue the refill process. Please do not wait until the last minute to call your pharmacy. We need at least 48 hours (2days) to fill prescriptions.  We also encourage you to call your pharmacy before going to  your prescription to make sure it is ready. With regard to controlled substance prescription refill requests (narcotic refills) that need to be picked up at our office, we ask your cooperation by providing us with at least 72 hours (3days) notice that you will need a refill. We will not refill narcotic prescription refill requests after 4:00pm on any weekday, Monday through Thursday, or after 2:00pm on Fridays, or on the weekends. We encourage everyone to explore another way of getting your prescription refill request processed using Billdesk, our patient web portal through our electronic medical record system. Bil[employer]esk is an efficient and effective way to communicate your medication request directly to the office and  downloadable as an pam on your smart phone . Bil[employer]esk also features a review functionality that allows you to view your medication list as well as leave messages for your physician. Are you ready to get connected? If so please review the attatched instructions or speak to any of our staff to get you set up right away! Thank you so much for your cooperation. Should you have any questions please contact our Practice Administrator. The Physicians and Staff,  [facility] Introducing [facility] SERVICES! Dear Toan Shin: 
Thank you for requesting a Billdesk account. Our records indicate that you have previously registered for a Billdesk account but its currently inactive. Please call our Billdesk support line at [phone]. Additional Information If you have questions, please visit the Frequently Asked Questions section of the Billdesk website at [email or web address] Remember, Billdesk is NOT to be used for urgent needs. For medical emergencies, dial 911. Now available from your iPhone and Android! Please provide this summary of care documentation to your next provider. Your primary care clinician is listed as Zheng Ambrosio. If you have any questions after today's visit, please call [phone].

## 2018-01-23 DIAGNOSIS — S09.90XA DEPRESSION DUE TO HEAD INJURY: ICD-10-CM

## 2018-01-23 DIAGNOSIS — F32.A DEPRESSION DUE TO HEAD INJURY: ICD-10-CM

## 2018-01-23 RX ORDER — FLUOXETINE 10 MG/1
CAPSULE ORAL
Qty: 30 CAP | Refills: 3 | Status: SHIPPED | OUTPATIENT
Start: 2018-01-23 | End: 2018-02-26 | Stop reason: SDUPTHER

## 2018-02-26 ENCOUNTER — OFFICE VISIT (OUTPATIENT)
Dept: FAMILY MEDICINE CLINIC | Age: 63
End: 2018-02-26

## 2018-02-26 VITALS
WEIGHT: 210.2 LBS | HEIGHT: 73 IN | DIASTOLIC BLOOD PRESSURE: 87 MMHG | BODY MASS INDEX: 27.86 KG/M2 | HEART RATE: 67 BPM | SYSTOLIC BLOOD PRESSURE: 124 MMHG | RESPIRATION RATE: 18 BRPM | TEMPERATURE: 97.5 F | OXYGEN SATURATION: 95 %

## 2018-02-26 DIAGNOSIS — I10 ESSENTIAL HYPERTENSION: ICD-10-CM

## 2018-02-26 DIAGNOSIS — Z72.0 TOBACCO ABUSE: ICD-10-CM

## 2018-02-26 DIAGNOSIS — S09.90XA DEPRESSION DUE TO HEAD INJURY: Primary | ICD-10-CM

## 2018-02-26 DIAGNOSIS — F32.A DEPRESSION DUE TO HEAD INJURY: Primary | ICD-10-CM

## 2018-02-26 PROBLEM — F33.9 RECURRENT DEPRESSION (HCC): Status: ACTIVE | Noted: 2018-02-26

## 2018-02-26 RX ORDER — FLUOXETINE HYDROCHLORIDE 20 MG/1
20 CAPSULE ORAL DAILY
Qty: 30 CAP | Refills: 3 | Status: SHIPPED | OUTPATIENT
Start: 2018-02-26 | End: 2018-07-25 | Stop reason: SDUPTHER

## 2018-02-26 NOTE — PATIENT INSTRUCTIONS

## 2018-02-26 NOTE — MR AVS SNAPSHOT
Lavaun Jeans 
 
 
 2005 A [facility] [address] [address] 
[phone] Patient: Rosa Correa. MRN: [id number] KRISTEN:[birth year] Visit Information Date & Time Provider Department Dept. Phone Encounter #  
 2/26/2018  1:00 PM Erika Epps, 7 Hernan Rozel [id number] Follow-up Instructions Return in about 3 months (around 5/26/2018). Your Appointments 6/12/2018  9:30 AM  
New Patient with Misael Hernández MD  
Neurology [address] [facility],  [id number] [facility]) Appt Note: 6 month f/u Memory 2005 A [facility] [address]  
[phone]  
  
   
 [facility] [address]  
  
    
 6/25/2018 11:20 AM  
New Patient with Eppie Section, PsyD [address] [facility] [address] ([facility]ly [facility]) Appt Note: new patient memory/ Arun Lino [place] [address] [address] [phone]  
  
   
 [place] [address] [address] [address] 6/25/2018  1:00 PM  
Any with Eppie Section, PsyD [address] ([facility]) Appt Note: 3 hour testing/ Arun Lino [place] [address] [address] [phone]  
  
   
 [place] [address] [address] [address] Upcoming Health Maintenance Date Due FOBT Q 1 YEAR AGE 50-75 12/10/2015 ZOSTER VACCINE AGE 60> 7/12/2017 DTaP/Tdap/Td series (2 - Td) 2/3/2027 Allergies as of 2/26/2018  Review Complete On: 2/26/2018 By: Erika Epps MD  
 No Known Allergies Current Immunizations  Reviewed on 10/26/2017 Name Date Influenza High Dose Vaccine PF 9/18/2017 Influenza Vaccine 9/6/2013 Influenza Vaccine (Quad) PF 11/28/2016, 11/17/2015, 11/25/2014 Pneumococcal Polysaccharide (PPSV-23) 10/26/2017, 9/18/2017 Tdap 2/3/2017 Not reviewed this visit You Were Diagnosed With   
  
 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 
ICD-9-CM: 401.9 Tobacco abuse     ICD-10-CM: Z72.0 ICD-9-CM: 305.1 Vitals BP Pulse Temp Resp Height(growth percentile) Weight(growth percentile) 124/87 (BP 1 Location: Left arm, BP Patient Position: Sitting) 67 97.5 °F (36.4 °C) (Oral) 18 6' 1\" (1.854 m) 210 lb 3.2 oz (95.3 kg) SpO2 BMI Smoking Status 95% 27.73 kg/m2 Current Every Day Smoker Vitals History BMI and BSA Data Body Mass Index Body Surface Area  
 27.73 kg/m 2 2.22 m 2 Preferred Pharmacy Pharmacy Name Phone [address] [place], [place] - [phone] Your Updated Medication List  
  
   
This list is accurate as of 2/26/18  1:34 PM.  Always use your most recent med list.  
  
  
  
  
 aspirin delayed-release 81 mg tablet Take  by mouth daily. carBAMazepine 100 mg chewable tablet Commonly known as:  TEGretol CHEW & SWALLOW 1 TABLET BY MOUTH 3 TIMES A DAY  
  
 cetirizine 10 mg tablet Commonly known as:  ZYRTEC Take 1 Tab by mouth daily. FLUoxetine 20 mg capsule Commonly known as:  PROzac Take 1 Cap by mouth daily. TAKE ONE CAPSULE BY MOUTH EVERY DAY  
  
 losartan 100 mg tablet Commonly known as:  COZAAR  
TAKE ONE TABLET BY MOUTH DAILY  
  
 metoprolol tartrate 25 mg tablet Commonly known as:  LOPRESSOR  
TAKE 1/2 TABLET BY MOUTH TWICE DAILY pravastatin 10 mg tablet Commonly known as:  PRAVACHOL  
TAKE ONE TABLET BY MOUTH NIGHTLY Prescriptions Sent to Pharmacy Refills FLUoxetine (PROZAC) 20 mg capsule 3 Sig: Take 1 Cap by mouth daily. TAKE ONE CAPSULE BY MOUTH EVERY DAY Class: Normal  
 Pharmacy: [facility] #: [phone] Route: Oral  
  
Follow-up Instructions Return in about 3 months (around 5/26/2018). Patient Instructions Learning About Benefits From Quitting Smoking How does quitting smoking make you healthier? If you're thinking about quitting smoking, you may have a few reasons to be smoke-free. Your health may be one of them. · When you quit smoking, you lower your risks for cancer, lung disease, heart attack, stroke, blood vessel disease, and blindness from macular degeneration. · When you're smoke-free, you get sick less often, and you heal faster. You are less likely to get colds, flu, bronchitis, and pneumonia. · As a nonsmoker, you may find that your mood is better and you are less stressed. When and how will you feel healthier? Quitting has real health benefits that start from day 1 of being smoke-free. And the longer you stay smoke-free, the healthier you get and the better you feel. The first hours · After just 20 minutes, your blood pressure and heart rate go down. That means there's less stress on your heart and blood vessels. · Within 12 hours, the level of carbon monoxide in your blood drops back to normal. That makes room for more oxygen. With more oxygen in your body, you may notice that you have more energy than when you smoked. After 2 weeks · Your lungs start to work better. · Your risk of heart attack starts to drop. After 1 month · When your lungs are clear, you cough less and breathe deeper, so it's easier to be active. · Your sense of taste and smell return. That means you can enjoy food more than you have since you started smoking. Over the years · After 1 year, your risk of heart disease is half what it would be if you kept smoking. · After 5 years, your risk of stroke starts to shrink. Within a few years after that, it's about the same as if you'd never smoked.  
· After 10 years, your risk of dying from lung cancer is cut by about half. And your risk for many other types of cancer is lower too. How would quitting help others in your life? When you quit smoking, you improve the health of everyone who now breathes in your smoke. · Their heart, lung, and cancer risks drop, much like yours. · They are sick less. For babies and small children, living smoke-free means they're less likely to have ear infections, pneumonia, and bronchitis. · If you're a woman who is or will be pregnant someday, quitting smoking means a healthier . · Children who are close to you are less likely to become adult smokers. Where can you learn more? Go to http://jonatan-donna.info/. Enter [id number] [id number] [id number] 11 in the search box to learn more about \"Learning About Benefits From Quitting Smoking. \" Current as of: 2017 Content Version: 11.4 © 6255-5381 HealthWarehouse.com. Care instructions adapted under license by Grass[employer] (which disclaims liability or warranty for this information). If you have questions about a medical condition or this instruction, always ask your healthcare professional. [place] [address] any warranty or liability for your use of this information. Introducing [facility] & HEALTH SERVICES! Dear Guerline Williamson: 
Thank you for requesting a Nezasa account. Our records indicate that you have previously registered for a Nezasa account but its currently inactive. Please call our Nezasa support line at [phone]. Additional Information If you have questions, please visit the Frequently Asked Questions section of the Nezasa website at [email or web address] Remember, Nezasa is NOT to be used for urgent needs. For medical emergencies, dial 911. Now available from your iPhone and Android! Please provide this summary of care documentation to your next provider. Your primary care clinician is listed as Khalif Butt.  If you have any questions after today's visit, please call [phone].

## 2018-02-26 NOTE — PROGRESS NOTES
Progress Note    Patient: Patricia Hare MRN: [id number]  SSN: [id number]    YOB: 1955  Age: 63 y.o. Sex: male        Subjective:     Chief Complaint   Patient presents with    Physical       HPI: he is a 63y.o. year old male who presents for follow up of HTN and HLD. Patient with hx of skull fracture with resultant depression, neurogenic pain and hearing loss. He now has a hearing aids. He has seen neurologist with scheduled follow up in June. Patient denies HA, dizziness, SOB, CP, abdominal pain, dysuria, myalgias or arthralgias. He complains of feeling stressed by people. He continues to smoke. BP Readings from Last 3 Encounters:   02/26/18 124/87   12/12/17 124/81   09/18/17 150/90     Wt Readings from Last 3 Encounters:   02/26/18 210 lb 3.2 oz (95.3 kg)   12/12/17 212 lb 3.2 oz (96.3 kg)   09/18/17 213 lb 3.2 oz (96.7 kg)     Body mass index is 27.73 kg/(m^2). Current and past medical information:    Current Medications after this visit[de-identified]     Current Outpatient Prescriptions   Medication Sig    FLUoxetine (PROZAC) 20 mg capsule Take 1 Cap by mouth daily. TAKE ONE CAPSULE BY MOUTH EVERY DAY    metoprolol tartrate (LOPRESSOR) 25 mg tablet TAKE 1/2 TABLET BY MOUTH TWICE DAILY    pravastatin (PRAVACHOL) 10 mg tablet TAKE ONE TABLET BY MOUTH NIGHTLY    losartan (COZAAR) 100 mg tablet TAKE ONE TABLET BY MOUTH DAILY    carBAMazepine (TEGRETOL) 100 mg chewable tablet CHEW & SWALLOW 1 TABLET BY MOUTH 3 TIMES A DAY    aspirin delayed-release 81 mg tablet Take  by mouth daily.  cetirizine (ZYRTEC) 10 mg tablet Take 1 Tab by mouth daily. No current facility-administered medications for this visit.         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 No pertinent surgical history. Social History     Social History    Marital status: SINGLE     Spouse name: N/A    Number of children: N/A    Years of education: N/A     Social History Main Topics    Smoking status: Current Every Day Smoker     Packs/day: 1.00     Types: Cigarettes    Smokeless tobacco: Never Used    Alcohol use No    Drug use: No    Sexual activity: Not Asked     Other Topics Concern    None     Social History Narrative         Objective:     Review of Systems:  Constitutional: Negative for fatigue or malaise  Derm: Negative for rash or lesion  HEENT: Negative for acute hearing or vision changes  Cardiovascular: Negative for dizziness, chest pain or palpitations  Respiratory: Negative for cough, wheezing or SOB  Gastreintestinal: Negative for N/V/D/C  Genital/urinary: Negative for dysuria or voiding dysfunction  Muscoloskeletal: Negative for myalgias or arthralgias   Neurological: Negative for headache, weakness or paresthesia  Psychological: see HPI      Vitals:    02/26/18 1302   BP: 124/87   Pulse: 67   Resp: 18   Temp: 97.5 °F (36.4 °C)   TempSrc: Oral   SpO2: 95%   Weight: 210 lb 3.2 oz (95.3 kg)   Height: 6' 1\" (1.854 m)      Body mass index is 27.73 kg/(m^2).     Physical Exam:  Constitutional: well developed, well nourished, in no acute distress  Skin: warm and dry, normal tone and turgor  Head: normocephalic, atraumatic  Eyes: sclera clear, EOMI, PERRL  Neck: normal range of motion  CV: normal S1, S2, regular rate and rhythm  Respiratory: clear to auscultation bilaterally with symmetrical effort  Abdomen: soft, BS normal  Extremities: full range of motion  Neurology: normal strength and sensation  Psych: active, alert and oriented, affect appropriate     Health Maintenance Due   Topic Date Due    FOBT Q 1 YEAR AGE 50-75  12/10/2015    ZOSTER VACCINE AGE 60>  07/12/2017       Risk, benefits and potential costs of recommended health maintenance discussed. Patient expressed understanding and declined at this time. Assessment and orders:     Diagnoses and all orders for this visit:    1. 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 TAKE ONE CAPSULE BY MOUTH EVERY DAY    2. Essential hypertension    3. Tobacco abuse      Plan of care:  Discussed diagnoses in detail with patient. Will increase dose of fluoxetine  Strongly advised patient to stop all use of tobacco products. Medication risks/benefits/side effects discussed with patient. All of the patient's questions were addressed. The patient understands and agrees with our plan of care. The patient knows to call back if they are unsure of or forget any changes we discussed today or if the symptoms change. The patient received an After-Visit Summary which contains VS, orders, medication list and allergy list.     Patient Care Team:  Karen Russell MD as PCP - General (Family Practice)  Prabhu Strickland MD (Rheumatology)  Ismael Burns MD (Otolaryngology)  Bhakti Gatica MD (Cardiology)  Lori Arndt MD (Neurology)    Follow-up Disposition:  Return in about 3 months (around 5/26/2018), or if symptoms worsen or fail to improve.     Future Appointments  Date Time Provider Vanessa Gutierrez   6/12/2018 9:30 AM Heath Lopez MD [facility]   6/25/2018 11:20 AM Kimberly Zaman   6/25/2018 1:00 PM Kimberly Zaman 27       Signed By: Karen Russell MD     February 26, 2018

## 2018-02-26 NOTE — PROGRESS NOTES
Chief Complaint   Patient presents with    Physical     1. Have you been to the ER, urgent care clinic since your last visit? Hospitalized since your last visit? No    2. Have you seen or consulted any other health care providers outside of the [address] [place] [address] since your last visit? Include any pap smears or colon screening.  No

## 2018-03-17 NOTE — PROGRESS NOTES
Reviewed record in preparation for visit and have necessary documentation  Pt did not bring medication to office visit for review    Goals that were addressed and/or need to be completed during or after this appointment include   Health Maintenance Due   Topic Date Due    Pneumococcal 19-64 Medium Risk (1 of 1 - PPSV23) 09/12/1976    DTaP/Tdap/Td series (1 - Tdap) 09/12/1978    FOBT Q 1 YEAR AGE 50-75  12/10/2015 UTI (urinary tract infection)

## 2018-05-02 DIAGNOSIS — I10 ESSENTIAL HYPERTENSION WITH GOAL BLOOD PRESSURE LESS THAN 140/90: ICD-10-CM

## 2018-05-03 RX ORDER — LOSARTAN POTASSIUM 100 MG/1
TABLET ORAL
Qty: 30 TAB | Refills: 3 | Status: SHIPPED | OUTPATIENT
Start: 2018-05-03 | End: 2018-08-31 | Stop reason: SDUPTHER

## 2018-05-09 DIAGNOSIS — I10 ESSENTIAL HYPERTENSION: ICD-10-CM

## 2018-05-09 NOTE — TELEPHONE ENCOUNTER
Patient requesting a refill  Patient is completely out  Patient uses [facility]    Thanks    Juliann Payan

## 2018-05-10 RX ORDER — METOPROLOL TARTRATE 25 MG/1
TABLET, FILM COATED ORAL
Qty: 30 TAB | Refills: 2 | Status: SHIPPED | OUTPATIENT
Start: 2018-05-10 | End: 2018-08-09 | Stop reason: SDUPTHER

## 2018-05-16 DIAGNOSIS — R56.9 CONVULSIONS, UNSPECIFIED CONVULSION TYPE (HCC): ICD-10-CM

## 2018-05-16 RX ORDER — CARBAMAZEPINE 100 MG/1
TABLET, CHEWABLE ORAL
Qty: 90 TAB | Refills: 2 | Status: SHIPPED | OUTPATIENT
Start: 2018-05-16 | End: 2018-08-15 | Stop reason: SDUPTHER

## 2018-05-24 ENCOUNTER — OFFICE VISIT (OUTPATIENT)
Dept: FAMILY MEDICINE CLINIC | Age: 63
End: 2018-05-24

## 2018-05-24 VITALS
HEIGHT: 73 IN | HEART RATE: 65 BPM | TEMPERATURE: 97.7 F | OXYGEN SATURATION: 97 % | WEIGHT: 212 LBS | DIASTOLIC BLOOD PRESSURE: 82 MMHG | SYSTOLIC BLOOD PRESSURE: 124 MMHG | BODY MASS INDEX: 28.1 KG/M2 | RESPIRATION RATE: 18 BRPM

## 2018-05-24 DIAGNOSIS — Z72.0 TOBACCO ABUSE: ICD-10-CM

## 2018-05-24 DIAGNOSIS — F33.9 RECURRENT DEPRESSION (HCC): ICD-10-CM

## 2018-05-24 DIAGNOSIS — I10 ESSENTIAL HYPERTENSION: Primary | ICD-10-CM

## 2018-05-24 NOTE — MR AVS SNAPSHOT
[address] [facility] 
 
 
 2003 [address] [place] [address] [address] 
[phone] Patient: Sandeep Arenas. MRN: [id number] CYNDEE:[birth year] Visit Information Date & Time Provider Department Dept. Phone Encounter #  
 5/24/2018  2:20 PM Jamal Azevedo MD [address] [place] [address] [id number] Follow-up Instructions Return in about 4 months (around 9/24/2018), or if symptoms worsen or fail to improve. Your Appointments 6/12/2018  9:30 AM  
New Patient with Gautam Dubois MD  
Neurology [address] [facility],  [address]) Appt Note: 6 month f/u Memory 2005 A [facility] [address]  
[phone]  
  
   
 [facility] [address]  
  
    
 6/25/2018 11:20 AM  
New Patient with Angel Thorpe PsyD [address] ([address]) Appt Note: new patient memory/ Arun [place] [address] [address] [phone]  
  
   
 [place] [address] [address] [address] 6/25/2018  1:00 PM  
Any with Angel Thorpe PsyD [address] Appt Note: 3 hour testing/ Arun Lino [place] [address] [address] [phone]  
  
   
 [place] [address] [address] [address] [place] Upcoming Health Maintenance Date Due FOBT Q 1 YEAR AGE 50-75 12/10/2015 ZOSTER VACCINE AGE 60> 7/12/2017 Influenza Age 5 to Adult 8/1/2018 MEDICARE YEARLY EXAM 9/19/2018 DTaP/Tdap/Td series (2 - Td) 2/3/2027 Allergies as of 5/24/2018  Review Complete On: 5/24/2018 By: Jamal Azevedo MD  
 No Known Allergies Current Immunizations  Reviewed on 5/24/2018 Name Date Influenza High Dose Vaccine PF 9/18/2017 Influenza Vaccine 9/6/2013 Influenza Vaccine (Quad) PF 11/28/2016, 11/17/2015, 11/25/2014 Pneumococcal Polysaccharide (PPSV-23) 10/26/2017, 9/18/2017 Tdap 2/3/2017 Reviewed by Praveena Barajas MD on 5/24/2018 at  2:53 PM  
 Reviewed by Praveena Barajas MD on 5/24/2018 at  2:53 PM  
You Were Diagnosed With   
  
 Codes Comments Essential hypertension    -  Primary ICD-10-CM: I10 
ICD-9-CM: 401.9 Recurrent depression ([facility] [address]     ICD-10-CM: F33.9 ICD-9-CM: 296.30 Tobacco abuse     ICD-10-CM: Z72.0 ICD-9-CM: 305.1 Vitals BP Pulse Temp Resp Height(growth percentile) Weight(growth percentile) 124/82 (BP 1 Location: Right arm, BP Patient Position: Sitting) 65 97.7 °F (36.5 °C) (Oral) 18 6' 1\" (1.854 m) 212 lb (96.2 kg) SpO2 BMI Smoking Status 97% 27.97 kg/m2 Current Every Day Smoker Vitals History BMI and BSA Data Body Mass Index Body Surface Area  
 27.97 kg/m 2 2.23 m 2 Preferred Pharmacy Pharmacy Name Phone [address] [place], [place] - [address][phone] Your Updated Medication List  
  
   
This list is accurate as of 5/24/18  3:08 PM.  Always use your most recent med list.  
  
  
  
  
 aspirin delayed-release 81 mg tablet Take  by mouth daily. carBAMazepine 100 mg chewable tablet Commonly known as:  TEGretol CHEW & SWALLOW 1 TABLET BY MOUTH 3 TIMES A DAY  
  
 cetirizine 10 mg tablet Commonly known as:  ZYRTEC Take 1 Tab by mouth daily. FLUoxetine 20 mg capsule Commonly known as:  PROzac Take 1 Cap by mouth daily. TAKE ONE CAPSULE BY MOUTH EVERY DAY  
  
 losartan 100 mg tablet Commonly known as:  COZAAR  
TAKE ONE TABLET BY MOUTH DAILY  
  
 metoprolol tartrate 25 mg tablet Commonly known as:  LOPRESSOR  
TAKE 1/2 TABLET BY MOUTH TWICE DAILY pravastatin 10 mg tablet Commonly known as:  PRAVACHOL  
TAKE ONE TABLET BY MOUTH NIGHTLY Follow-up Instructions Return in about 4 months (around 9/24/2018), or if symptoms worsen or fail to improve. Patient Instructions Learning About Benefits From Quitting Smoking How does quitting smoking make you healthier? If you're thinking about quitting smoking, you may have a few reasons to be smoke-free. Your health may be one of them. · When you quit smoking, you lower your risks for cancer, lung disease, heart attack, stroke, blood vessel disease, and blindness from macular degeneration. · When you're smoke-free, you get sick less often, and you heal faster. You are less likely to get colds, flu, bronchitis, and pneumonia. · As a nonsmoker, you may find that your mood is better and you are less stressed. When and how will you feel healthier? Quitting has real health benefits that start from day 1 of being smoke-free. And the longer you stay smoke-free, the healthier you get and the better you feel. The first hours · After just 20 minutes, your blood pressure and heart rate go down. That means there's less stress on your heart and blood vessels. · Within 12 hours, the level of carbon monoxide in your blood drops back to normal. That makes room for more oxygen. With more oxygen in your body, you may notice that you have more energy than when you smoked. After 2 weeks · Your lungs start to work better. · Your risk of heart attack starts to drop. After 1 month · When your lungs are clear, you cough less and breathe deeper, so it's easier to be active. · Your sense of taste and smell return. That means you can enjoy food more than you have since you started smoking. Over the years · After 1 year, your risk of heart disease is half what it would be if you kept smoking. · After 5 years, your risk of stroke starts to shrink. Within a few years after that, it's about the same as if you'd never smoked.  
· After 10 years, your risk of dying from lung cancer is cut by about half. And your risk for many other types of cancer is lower too. How would quitting help others in your life? When you quit smoking, you improve the health of everyone who now breathes in your smoke. · Their heart, lung, and cancer risks drop, much like yours. · They are sick less. For babies and small children, living smoke-free means they're less likely to have ear infections, pneumonia, and bronchitis. · If you're a woman who is or will be pregnant someday, quitting smoking means a healthier . · Children who are close to you are less likely to become adult smokers. Where can you learn more? Go to http://jonatanLifeStreet Mediadonna.info/. Enter [id number] [id number] [id number] 11 in the search box to learn more about \"Learning About Benefits From Quitting Smoking. \" Current as of: 2017 Content Version: 11.4 © 6051-8819 Meaningo. Care instructions adapted under license by Mtime (which disclaims liability or warranty for this information). If you have questions about a medical condition or this instruction, always ask your healthcare professional. [place] [address] any warranty or liability for your use of this information. Introducing [facility] & HEALTH SERVICES! [facility] introduces PostHelpers patient portal. Now you can access parts of your medical record, email your doctor's office, and request medication refills online. 1. In your internet browser, go to [email or web address] 2. Click on the First Time User? Click Here link in the Sign In box. You will see the New Member Sign Up page. 3. Enter your PostHelpers Access Code exactly as it appears below. You will not need to use this code after youve completed the sign-up process. If you do not sign up before the expiration date, you must request a new code. · PostHelpers Access Code: [email or web address] Expires: 2018  3:03 PM 
 
 4. Enter the last four digits of your Social Security Number (xxxx) and Date of Birth (mm/dd/yyyy) as indicated and click Submit. You will be taken to the next sign-up page. 5. Create a [employer] Legend [employer] ID. This will be your [employer] login ID and cannot be changed, so think of one that is secure and easy to remember. 6. Create a [employer] password. You can change your password at any time. 7. Enter your Password Reset Question and Answer. This can be used at a later time if you forget your password. 8. Enter your e-mail address. You will receive e-mail notification when new information is available in [address] 9. Click Sign Up. You can now view and download portions of your medical record. 10. Click the Download Summary menu link to download a portable copy of your medical information. If you have questions, please visit the Frequently Asked Questions section of the [employer] website. Remember, [employer] is NOT to be used for urgent needs. For medical emergencies, dial 911. Now available from your iPhone and Android! Please provide this summary of care documentation to your next provider. Your primary care clinician is listed as Sly Fallon. If you have any questions after today's visit, please call [phone].

## 2018-05-24 NOTE — PROGRESS NOTES
Progress Note    Patient: Radha Dawson. MRN: [id number]  SSN: [id number]    YOB: 1955  Age: 63 y.o. Sex: male        Subjective:     Chief Complaint   Patient presents with    Hypertension       HPI: he is a 63y.o. year old male who presents for follow up of HTN and HLD. Patient with hx of skull fracture with resultant depression, neurogenic pain and hearing loss. He now has a hearing aids and says this has made a significant improvement in his quality of life. Patient denies HA, dizziness, SOB, CP, abdominal pain, dysuria, myalgias or arthralgias. He says he mood is good with current dose of fluoxetine. BP and HLD well controlled. He continues to smoke. BP Readings from Last 3 Encounters:   05/24/18 124/82   02/26/18 124/87   12/12/17 124/81     Wt Readings from Last 3 Encounters:   05/24/18 212 lb (96.2 kg)   02/26/18 210 lb 3.2 oz (95.3 kg)   12/12/17 212 lb 3.2 oz (96.3 kg)     Body mass index is 27.97 kg/(m^2). Current and past medical information:    Current Medications after this visit[de-identified]     Current Outpatient Prescriptions   Medication Sig    carBAMazepine (TEGRETOL) 100 mg chewable tablet CHEW & SWALLOW 1 TABLET BY MOUTH 3 TIMES A DAY    metoprolol tartrate (LOPRESSOR) 25 mg tablet TAKE 1/2 TABLET BY MOUTH TWICE DAILY    losartan (COZAAR) 100 mg tablet TAKE ONE TABLET BY MOUTH DAILY    FLUoxetine (PROZAC) 20 mg capsule Take 1 Cap by mouth daily. TAKE ONE CAPSULE BY MOUTH EVERY DAY    pravastatin (PRAVACHOL) 10 mg tablet TAKE ONE TABLET BY MOUTH NIGHTLY    aspirin delayed-release 81 mg tablet Take  by mouth daily.  cetirizine (ZYRTEC) 10 mg tablet Take 1 Tab by mouth daily. No current facility-administered medications for this visit.         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 Social History     Social History    Marital status: SINGLE     Spouse name: N/A    Number of children: N/A    Years of education: N/A     Social History Main Topics    Smoking status: Current Every Day Smoker     Packs/day: 1.00     Types: Cigarettes    Smokeless tobacco: Never Used    Alcohol use No    Drug use: No    Sexual activity: Not Asked     Other Topics Concern    None     Social History Narrative         Objective:     Review of Systems:  Constitutional: Negative for fatigue or malaise  Derm: Negative for rash or lesion  HEENT: Negative for acute hearing or vision changes  Cardiovascular: Negative for dizziness, chest pain or palpitations  Respiratory: Negative for cough, wheezing or SOB  Gastreintestinal: Negative for N/V/D/C  Genital/urinary: Negative for dysuria or voiding dysfunction  Muscoloskeletal: Negative for myalgias or arthralgias   Neurological: Negative for headache, weakness or paresthesia  Psychological: see HPI      Vitals:    05/24/18 1404   BP: 124/82   Pulse: 65   Resp: 18   Temp: 97.7 °F (36.5 °C)   TempSrc: Oral   SpO2: 97%   Weight: 212 lb (96.2 kg)   Height: 6' 1\" (1.854 m)      Body mass index is 27.97 kg/(m^2).     Physical Exam:  Constitutional: well developed, well nourished, in no acute distress  Skin: warm and dry, normal tone and turgor  Head: normocephalic, atraumatic  Eyes: sclera clear, EOMI, PERRL  Neck: normal range of motion  CV: normal S1, S2, regular rate and rhythm  Respiratory: clear to auscultation bilaterally with symmetrical effort  Abdomen: soft, BS normal  Extremities: full range of motion  Neurology: normal strength and sensation  Psych: active, alert and oriented, affect appropriate     Health Maintenance Due   Topic Date Due    FOBT Q 1 YEAR AGE 50-75  12/10/2015    ZOSTER VACCINE AGE 60>  07/12/2017       Risk, benefits and potential costs of recommended health maintenance discussed. Patient expressed understanding and declined at this time. Assessment and orders:     Diagnoses and all orders for this visit:    1. Essential hypertension    2. Recurrent depression ([facility] [address]    3. Tobacco abuse      Plan of care:  Discussed diagnoses and most recent lab results in detail with patient. Strongly advised patient to stop all use of tobacco products. Medication risks/benefits/side effects discussed with patient. Will continue current of fluoxetine   Continue other current prescribed medications as written. All of the patient's questions were addressed. The patient understands and agrees with our plan of care. The patient knows to call back if they are unsure of or forget any changes we discussed today or if the symptoms change. The patient received an After-Visit Summary which contains VS, orders, medication list and allergy list.   Over half of the 25 minutes face to face with Anabel Naidu. consisted of counseling, lab review and discussing treatment plans in reference to his mood, smoking cessation and co-morbidities. Patient Care Team:  Tom Chandler MD as PCP - General (Family Practice)  Augusta Clayton MD (Rheumatology)  Tutu Silva MD (Otolaryngology)  Audrey Quiroz MD (Cardiology)  Sunil Quintanilla MD (Neurology)    Follow-up Disposition:  Return in about 4 months (around 9/24/2018), or if symptoms worsen or fail to improve.     Future Appointments  Date Time Provider Vanessa Gutierrez   6/12/2018 9:30 AM MD FRANCISCO Diaz   6/25/2018 11:20 AM Kimberly Blankenship 27   6/25/2018 1:00 PM Kimberly Blankenship 27       Signed By: García Mccoy MD     May 24, 2018

## 2018-05-24 NOTE — PROGRESS NOTES
1. Have you been to the ER, urgent care clinic since your last visit? Hospitalized since your last visit? No    2. Have you seen or consulted any other health care providers outside of the [address] [place] [address] since your last visit? Include any pap smears or colon screening.  No  Reviewed record in preparation for visit and have necessary documentation  Pt did not bring medication to office visit for review  opportunity was given for questions  Goals that were addressed and/or need to be completed during or after this appointment include    Health Maintenance Due   Topic Date Due    FOBT Q 1 YEAR AGE 50-75  12/10/2015    ZOSTER VACCINE AGE 60>  07/12/2017

## 2018-05-24 NOTE — PATIENT INSTRUCTIONS

## 2018-07-23 ENCOUNTER — TELEPHONE (OUTPATIENT)
Dept: FAMILY MEDICINE CLINIC | Age: 63
End: 2018-07-23

## 2018-07-23 NOTE — TELEPHONE ENCOUNTER
Per Dr. Harrington Lesch, gave the verbal OK for Shingrix to be administered in place of the old zoster vaccine.

## 2018-07-23 NOTE — TELEPHONE ENCOUNTER
Antonio Fields from [facility] called. She has an old prescription for the patient to get the old Shingles vaccine instead of the Shingrix. Do you want to write a prescription for the new one or does the patient need to be seen first by you?

## 2018-07-25 DIAGNOSIS — F32.A DEPRESSION DUE TO HEAD INJURY: ICD-10-CM

## 2018-07-25 DIAGNOSIS — S09.90XA DEPRESSION DUE TO HEAD INJURY: ICD-10-CM

## 2018-07-25 RX ORDER — FLUOXETINE HYDROCHLORIDE 20 MG/1
20 CAPSULE ORAL DAILY
Qty: 30 CAP | Refills: 3 | Status: SHIPPED | OUTPATIENT
Start: 2018-07-25 | End: 2018-10-23 | Stop reason: SDUPTHER

## 2018-07-25 NOTE — TELEPHONE ENCOUNTER
----- Message from Roma Dakin sent at 7/25/2018  8:40 AM EDT -----  Regarding: Dr. Shaheed Wesley H/[phone]    Pt stated, he took his last pill this morning. Pt is requesting refill for \"Fluoxetine HCL 20mg\" to be called in to [address] [place] [phone].

## 2018-08-09 DIAGNOSIS — I10 ESSENTIAL HYPERTENSION: ICD-10-CM

## 2018-08-10 RX ORDER — METOPROLOL TARTRATE 25 MG/1
TABLET, FILM COATED ORAL
Qty: 30 TAB | Refills: 2 | Status: SHIPPED | OUTPATIENT
Start: 2018-08-10 | End: 2018-11-07 | Stop reason: SDUPTHER

## 2018-08-15 DIAGNOSIS — R56.9 CONVULSIONS, UNSPECIFIED CONVULSION TYPE (HCC): ICD-10-CM

## 2018-08-16 RX ORDER — CARBAMAZEPINE 100 MG/1
TABLET, CHEWABLE ORAL
Qty: 90 TAB | Refills: 2 | Status: SHIPPED | OUTPATIENT
Start: 2018-08-16 | End: 2018-10-15 | Stop reason: SDUPTHER

## 2018-08-31 DIAGNOSIS — I10 ESSENTIAL HYPERTENSION WITH GOAL BLOOD PRESSURE LESS THAN 140/90: ICD-10-CM

## 2018-08-31 RX ORDER — LOSARTAN POTASSIUM 100 MG/1
TABLET ORAL
Qty: 30 TAB | Refills: 3 | Status: SHIPPED | OUTPATIENT
Start: 2018-08-31 | End: 2018-12-01 | Stop reason: SDUPTHER

## 2018-10-03 ENCOUNTER — OFFICE VISIT (OUTPATIENT)
Dept: FAMILY MEDICINE CLINIC | Age: 63
End: 2018-10-03

## 2018-10-03 VITALS
WEIGHT: 209 LBS | BODY MASS INDEX: 27.7 KG/M2 | TEMPERATURE: 98.7 F | OXYGEN SATURATION: 97 % | SYSTOLIC BLOOD PRESSURE: 147 MMHG | DIASTOLIC BLOOD PRESSURE: 90 MMHG | RESPIRATION RATE: 20 BRPM | HEIGHT: 73 IN | HEART RATE: 55 BPM

## 2018-10-03 DIAGNOSIS — Z72.0 TOBACCO ABUSE: ICD-10-CM

## 2018-10-03 DIAGNOSIS — Z23 ENCOUNTER FOR IMMUNIZATION: ICD-10-CM

## 2018-10-03 DIAGNOSIS — I10 ESSENTIAL HYPERTENSION: ICD-10-CM

## 2018-10-03 DIAGNOSIS — R06.09 DOE (DYSPNEA ON EXERTION): Primary | ICD-10-CM

## 2018-10-03 DIAGNOSIS — Z12.11 SCREEN FOR COLON CANCER: ICD-10-CM

## 2018-10-03 DIAGNOSIS — E78.5 HYPERLIPIDEMIA, UNSPECIFIED HYPERLIPIDEMIA TYPE: ICD-10-CM

## 2018-10-03 RX ORDER — ALBUTEROL SULFATE 5 MG/ML
2.5 SOLUTION RESPIRATORY (INHALATION) ONCE
Qty: 0.5 ML | Refills: 0
Start: 2018-10-03 | End: 2018-10-03

## 2018-10-03 RX ORDER — ALBUTEROL SULFATE 90 UG/1
2 AEROSOL, METERED RESPIRATORY (INHALATION)
Qty: 1 INHALER | Refills: 0 | Status: SHIPPED | OUTPATIENT
Start: 2018-10-03 | End: 2018-12-27 | Stop reason: SDUPTHER

## 2018-10-03 RX ORDER — ZOSTER VACCINE RECOMBINANT, ADJUVANTED 50 MCG/0.5
KIT INTRAMUSCULAR
COMMUNITY
Start: 2018-08-01 | End: 2018-10-03 | Stop reason: ALTCHOICE

## 2018-10-03 RX ORDER — SODIUM CHLORIDE FOR INHALATION 0.9 %
2.5 VIAL, NEBULIZER (ML) INHALATION ONCE
Qty: 3 ML | Refills: 0
Start: 2018-10-03 | End: 2018-10-03

## 2018-10-03 NOTE — PROGRESS NOTES
1. Have you been to the ER, urgent care clinic since your last visit? Hospitalized since your last visit? No 
 
2. Have you seen or consulted any other health care providers outside of the [address] [place] [address] since your last visit? Include any pap smears or colon screening. No 
Reviewed record in preparation for visit and have necessary documentation Pt did not bring medication to office visit for review Goals that were addressed and/or need to be completed during or after this appointment include Health Maintenance Due Topic Date Due  Shingrix Vaccine Age 50> (1 of 2) 09/12/2007  FOBT Q 1 YEAR AGE 50-75  12/10/2015  Influenza Age 5 to Adult  08/01/2018  MEDICARE YEARLY EXAM  09/19/2018 Jessie Desai. is a 66 y.o. male who presents for routine immunizations. He denies any symptoms , reactions or allergies that would exclude them from being immunized today. Risks and adverse reactions were discussed and the VIS was given to them. All questions were addressed.  
 
 
Chalo Magaña LPN

## 2018-10-03 NOTE — PROGRESS NOTES
Progress Note Patient: John Tom MRN: [id number]  SSN: [id number] YOB: 1955  Age: 66 y.o. Sex: male Subjective: Chief Complaint Patient presents with  Hypertension  Cholesterol Problem  Immunization/Injection HPI: he is a 66y.o. year old male who presents for follow up of HTN and HLD. Patient with hx of skull fracture with resultant depression, neurogenic pain and hearing loss. He now has a hearing aids. He says he mood is good with current dose of fluoxetine. Patient denies HA,  CP, abdominal pain, dysuria, myalgias or arthralgias. He complains of SOB and dizziness with activity. Says he has to stop to \"catch his breath. \" He continues to smoke. Hypertension: The patient reports:  taking medications as instructed, no medication side effects noted, no TIA's, no chest pain on exertion, notes new dyspnea on exertion, no swelling of ankles. BP Readings from Last 3 Encounters:  
10/03/18 147/90  
05/24/18 124/82  
02/26/18 124/87 Patient advised to log blood pressures at home 2-3 times weekly and bring to next visit. Call office as soon as possible if BP's over 140/90 on multiple occasions or with symptoms of dizziness, chest pain, shortness of breath, headache or ankle swelling. Our goal is to normalize the blood pressure to decrease the risks of strokes and heart attacks. The patient is in agreement with the plan. Hyperlipidemia Cardiovascular risks for him are: hypertension 
hyperlipidemia 
smoker 
obese. Currently he takes Pravachol (pravastatin) Our goal is to lower hyperlipidemia to decrease the risks of strokes and heart attacks Current and past medical information: 
 
Current Medications after this visit[de-identified]    
Current Outpatient Prescriptions Medication Sig  
 albuterol (PROVENTIL HFA, VENTOLIN HFA, PROAIR HFA) 90 mcg/actuation inhaler Take 2 Puffs by inhalation every four (4) hours as needed for Shortness of Breath.  sodium chloride 0.9 % nebu 2.5 mL by Nebulization route once for 1 dose.  albuterol (PROVENTIL) 5 mg/mL nebulizer solution 0.5 mL by Nebulization route once for 1 dose.  losartan (COZAAR) 100 mg tablet TAKE ONE TABLET BY MOUTH DAILY  carBAMazepine (TEGRETOL) 100 mg chewable tablet CHEW & SWALLOW 1 TABLET BY MOUTH 3 TIMES A DAY  metoprolol tartrate (LOPRESSOR) 25 mg tablet TAKE 1/2 TABLET BY MOUTH TWICE DAILY  FLUoxetine (PROZAC) 20 mg capsule Take 1 Cap by mouth daily. TAKE ONE CAPSULE BY MOUTH EVERY DAY  pravastatin (PRAVACHOL) 10 mg tablet TAKE ONE TABLET BY MOUTH NIGHTLY  aspirin delayed-release 81 mg tablet Take  by mouth daily.  cetirizine (ZYRTEC) 10 mg tablet Take 1 Tab by mouth daily. No current facility-administered medications for this visit. Patient Active Problem List  
 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  
 HTN (hypertension) 02/27/2012  Tinnitus 02/27/2012 Past Medical History:  
Diagnosis Date  H/O seasonal allergies  Hypertension No Known Allergies History reviewed. No pertinent surgical history. Social History Social History  Marital status: SINGLE Spouse name: N/A  
 Number of children: N/A  
 Years of education: N/A Social History Main Topics  Smoking status: Current Every Day Smoker Packs/day: 1.00 Types: Cigarettes  Smokeless tobacco: Never Used  Alcohol use No  
 Drug use: No  
 Sexual activity: Not Asked Other Topics Concern  None Social History Narrative Objective:  
 
Review of Systems: 
Constitutional: Negative for fatigue or malaise Derm: Negative for rash or lesion HEENT: Negative for acute hearing or vision changes Cardiovascular: Negative for dizziness, chest pain or palpitations Respiratory: Positive for TAN, Negative for cough or wheezing Gastreintestinal: Negative for N/V/D/C Genital/urinary: Negative for dysuria or voiding dysfunction Muscoloskeletal: Negative for myalgias or arthralgias Neurological: Negative for headache, weakness or paresthesia Psychological: see HPI Vitals:  
 10/03/18 0910 10/03/18 5851 BP: 145/89 147/90 Pulse: (!) 55 Resp: 20 Temp: 98.7 °F (37.1 °C) TempSrc: Oral   
SpO2: 97% Weight: 209 lb (94.8 kg) Height: 6' 1\" (1.854 m) Body mass index is 27.57 kg/(m^2). Physical Exam: 
Constitutional: well developed, well nourished, in no acute distress Skin: warm and dry, normal tone and turgor Head: normocephalic, atraumatic Eyes: sclera clear, EOMI, PERRL Neck: normal range of motion CV: normal S1, S2, regular rate and rhythm Respiratory: prolonged expiration with symmetrical effort Abdomen: soft, BS normal 
Extremities: full range of motion Neurology: normal strength and sensation Psych: active, alert and oriented, affect appropriate Health Maintenance Due Topic Date Due  Shingrix Vaccine Age 50> (1 of 2) 09/12/2007  FOBT Q 1 YEAR AGE 50-75  12/10/2015  MEDICARE YEARLY EXAM  09/19/2018 Risk, benefits and potential costs of recommended health maintenance discussed. Patient expressed understanding and declined at this time. Assessment and orders:  
 
Diagnoses and all orders for this visit: 
 
1. TAN (dyspnea on exertion) -     XR CHEST PA LAT; Future 
-     albuterol (PROVENTIL HFA, VENTOLIN HFA, PROAIR HFA) 90 mcg/actuation inhaler; Take 2 Puffs by inhalation every four (4) hours as needed for Shortness of Breath.  
-     CBC W/O DIFF 
 -     MT PRESSURIZED/NONPRESSURIZED INHALATION TREATMENT 
-     sodium chloride 0.9 % nebu; 2.5 mL by Nebulization route once for 1 dose. -     albuterol (PROVENTIL) 5 mg/mL nebulizer solution; 0.5 mL by Nebulization route once for 1 dose. -     ALBUTEROL, INHAL. SOL., FDA-APPROVED FINAL, NON-COMPOUND UNIT DOSE, 1 MG 
 
2. Essential hypertension -     METABOLIC PANEL, COMPREHENSIVE 
-     TSH 3RD GENERATION 3. Hyperlipidemia, unspecified hyperlipidemia type -     LIPID PANEL 
-     METABOLIC PANEL, COMPREHENSIVE 4. Tobacco abuse -     XR CHEST PA LAT; Future 5. Encounter for immunization -     Administration fee () for Medicare insured patients 
-     INFLUENZA VIRUS VACCINE QUADRIVALENT, [facility] [phone]) 6. Screen for colon cancer -     OCCULT BLOOD IMMUNOASSAY,DIAGNOSTIC Plan of care: 
Discussed diagnoses and most recent lab results in detail with patient. Discussed that patient respiratory symptoms consistent with COPD. Strongly advised patient to stop all use of tobacco products. Medication risks/benefits/side effects discussed with patient. Will continue current of fluoxetine Continue other current prescribed medications as written. All of the patient's questions were addressed. The patient understands and agrees with our plan of care. The patient knows to call back if they are unsure of or forget any changes we discussed today or if the symptoms change. The patient received an After-Visit Summary which contains VS, orders, medication list and allergy list.  
Over half of the 40 minutes face to face with Fiordaliza Thrasher. consisted of counseling, lab review and discussing treatment plans in reference to his TAN consistent with COPD and smoking cessation. Patient Care Team: 
Millie Grider MD as PCP - [facility]) Bhumika Ragsdale MD (Rheumatology) Darius Higgins MD (Otolaryngology) Kimberly Chapman MD (Cardiology) Norma Aaron MD (Neurology) Follow-up Disposition: Not on File No future appointments. Signed By: Rakel Cesar MD   
 October 3, 2018

## 2018-10-03 NOTE — MR AVS SNAPSHOT
[address] [address] 
[phone] Patient: Sumi Cameron. MRN: [id number] XII:[id number]/29/6450 Visit Information Date & Time Provider Department Dept. Phone Encounter #  
 10/3/2018  9:00 AM Ofelia Hanson MD [address] [id number] Upcoming Health Maintenance Date Due Shingrix Vaccine Age 50> (1 of 2) 9/12/2007 FOBT Q 1 YEAR AGE 50-75 12/10/2015 Influenza Age 5 to Adult 8/1/2018 MEDICARE YEARLY EXAM 9/19/2018 DTaP/Tdap/Td series (2 - Td) 2/3/2027 Allergies as of 10/3/2018  Review Complete On: 10/3/2018 By: Ofelia Hanson MD  
 No Known Allergies Current Immunizations  Reviewed on 5/24/2018 Name Date Influenza High Dose Vaccine PF 9/18/2017 Influenza Vaccine 9/6/2013 Influenza Vaccine (Quad) PF 11/28/2016, 11/17/2015, 11/25/2014 Influenza Vaccine (Tri) Adjuvanted  Incomplete Pneumococcal Polysaccharide (PPSV-23) 10/26/2017, 9/18/2017 Tdap 2/3/2017 Zoster Vaccine, Live 8/1/2018 Not reviewed this visit You Were Diagnosed With   
  
 Codes Comments Essential hypertension    -  Primary ICD-10-CM: I10 
ICD-9-CM: 401.9 Hyperlipidemia, unspecified hyperlipidemia type     ICD-10-CM: E78.5 ICD-9-CM: 272.4 TAN (dyspnea on exertion)     ICD-10-CM: R06.09 
ICD-9-CM: 786.09 Tobacco abuse     ICD-10-CM: Z72.0 ICD-9-CM: 305.1 Encounter for immunization     ICD-10-CM: Z52 ICD-9-CM: V03.89 Screen for colon cancer     ICD-10-CM: Z12.11 ICD-9-CM: V76.51 Vitals BP Pulse Temp Resp Height(growth percentile) Weight(growth percentile) 147/90 (BP 1 Location: Left arm, BP Patient Position: Sitting) (!) 55 98.7 °F (37.1 °C) (Oral) 20 6' 1\" (1.854 m) 209 lb (94.8 kg) SpO2 BMI Smoking Status 97% 27.57 kg/m2 Current Every Day Smoker Vitals History BMI and BSA Data Body Mass Index Body Surface Area  
 27.57 kg/m 2 2.21 m 2 Preferred Pharmacy Pharmacy Name Phone [facility] [address] [phone] Your Updated Medication List  
  
   
This list is accurate as of 10/3/18 10:24 AM.  Always use your most recent med list.  
  
  
  
  
 albuterol 90 mcg/actuation inhaler Commonly known as:  PROVENTIL HFA, VENTOLIN HFA, PROAIR HFA Take 2 Puffs by inhalation every four (4) hours as needed for Shortness of Breath. aspirin delayed-release 81 mg tablet Take  by mouth daily. carBAMazepine 100 mg chewable tablet Commonly known as:  TEGretol CHEW & SWALLOW 1 TABLET BY MOUTH 3 TIMES A DAY  
  
 cetirizine 10 mg tablet Commonly known as:  ZYRTEC Take 1 Tab by mouth daily. FLUoxetine 20 mg capsule Commonly known as:  PROzac Take 1 Cap by mouth daily. TAKE ONE CAPSULE BY MOUTH EVERY DAY  
  
 losartan 100 mg tablet Commonly known as:  COZAAR  
TAKE ONE TABLET BY MOUTH DAILY  
  
 metoprolol tartrate 25 mg tablet Commonly known as:  LOPRESSOR  
TAKE 1/2 TABLET BY MOUTH TWICE DAILY pravastatin 10 mg tablet Commonly known as:  PRAVACHOL  
TAKE ONE TABLET BY MOUTH NIGHTLY Prescriptions Sent to Pharmacy Refills  
 albuterol (PROVENTIL HFA, VENTOLIN HFA, PROAIR HFA) 90 mcg/actuation inhaler 0 Sig: Take 2 Puffs by inhalation every four (4) hours as needed for Shortness of Breath. Class: Normal  
 Pharmacy: [address] [place] [address] #: [phone] Route: Inhalation We Performed the Following ADMIN INFLUENZA VIRUS VAC [ HCP] CBC W/O DIFF [42926 CPT(R)] INFLUENZA VACCINE INACTIVATED (IIV), SUBUNIT, ADJUVANTED, IM [id number] CPT(R)] LIPID PANEL [92277 CPT(R)] METABOLIC PANEL, COMPREHENSIVE [71419 CPT(R)] OCCULT BLOOD IMMUNOASSAY,DIAGNOSTIC [46635 CPT(R)] TSH 3RD GENERATION [77865 CPT(R)] To-Do List   
 10/03/2018 Imaging:  XR CHEST PA LAT Patient Instructions Home Blood Pressure Test: About This Test 
What is it? A home blood pressure test allows you to keep track of your blood pressure at home. Blood pressure is a measure of the force of blood against the walls of your arteries. Blood pressure readings include two numbers, such as 130/80 (say \"130 over 80\"). The first number is the systolic pressure. The second number is the diastolic pressure. Why is this test done? You may do this test at home to: · Find out if you have high blood pressure. · Track your blood pressure if you have high blood pressure. · Track how well medicine is working to reduce high blood pressure. · Check how lifestyle changes, such as weight loss and exercise, are affecting blood pressure. How can you prepare for the test? 
· Do not use caffeine, tobacco, or medicines known to raise blood pressure (such as nasal decongestant sprays) for at least 30 minutes before taking your blood pressure. · Do not exercise for at least 30 minutes before taking your blood pressure. What happens before the test? 
Take your blood pressure while you feel comfortable and relaxed. Sit quietly with both feet on the floor for at least 5 minutes before the test. 
What happens during the test? 
· Sit with your arm slightly bent and resting on a table so that your upper arm is at the same level as your heart. · Roll up your sleeve or take off your shirt to expose your upper arm. · Wrap the blood pressure cuff around your upper arm so that the lower edge of the cuff is about 1 inch above the bend of your elbow. Proceed with the following steps depending on if you are using an automatic or manual pressure monitor. Automatic blood pressure monitors · Press the on/off button on the automatic monitor and wait until the ready-to-measure \"heart\" symbol appears next to zero in the display window. · Press the start button. The cuff will inflate and deflate by itself. · Your blood pressure numbers will appear on the screen. · Write your numbers in your log book, along with the date and time. Manual blood pressure monitors · Place the earpieces of a stethoscope in your ears, and place the bell of the stethoscope over the artery, just below the cuff. · Close the valve on the rubber inflating bulb. · Squeeze the bulb rapidly with your opposite hand to inflate the cuff until the dial or column of mercury reads about 30 mm Hg higher than your usual systolic pressure. If you do not know your usual pressure, inflate the cuff to 210 mm Hg or until the pulse at your wrist disappears. · Open the pressure valve just slightly by twisting or pressing the valve on the bulb. · As you watch the pressure slowly fall, note the level on the dial at which you first start to hear a pulsing or tapping sound through the stethoscope. This is your systolic blood pressure. · Continue letting the air out slowly. The sounds will become muffled and will finally disappear. Note the pressure when the sounds completely disappear. This is your diastolic blood pressure. Let out all the remaining air. · Write your numbers in your log book, along with the date and time. What else should you know about the test? 
Here are the categories of blood pressure for adults: 
Ideal blood pressure. Systolic is less than 976, and diastolic is less than 80. Elevated blood pressure. Systolic is 582 to 422, and diastolic is less than 80. High blood pressure (hypertension). Systolic is 946 or above. Diastolic is 80 or above. One or both numbers may be high. It is more accurate to take the average of several readings made throughout the day than to rely on a single reading. Follow-up care is a key part of your treatment and safety.  Be sure to make and go to all appointments, and call your doctor if you are having problems. It's also a good idea to keep a list of the medicines you take. Where can you learn more? Go to http://jonatan-donna.info/. Enter C427 in the search box to learn more about \"Home Blood Pressure Test: About This Test.\" Current as of: 2017 Content Version: 11.7 © 3465-6696 [employer]. Care instructions adapted under license by [employer] (which disclaims liability or warranty for this information). If you have questions about a medical condition or this instruction, always ask your healthcare professional. [address] [address] any warranty or liability for your use of this information. [address] Affiliated with [address] [place] [address], [facility] [address], [address] [place] [address] 
[phone] Monitor blood pressure outside the office several times weekly at different times during the day and evening. Bring the record to me in 3 weeks for review. Blood Pressure Record Patient Name:  ______________________ :  ______________________ Date/Time BP Reading Pulse Learning About Benefits From Quitting Smoking How does quitting smoking make you healthier? If you're thinking about quitting smoking, you may have a few reasons to be smoke-free. Your health may be one of them. · When you quit smoking, you lower your risks for cancer, lung disease, heart attack, stroke, blood vessel disease, and blindness from macular degeneration. · When you're smoke-free, you get sick less often, and you heal faster. You are less likely to get colds, flu, bronchitis, and pneumonia. · As a nonsmoker, you may find that your mood is better and you are less stressed. When and how will you feel healthier? Quitting has real health benefits that start from day 1 of being smoke-free. And the longer you stay smoke-free, the healthier you get and the better you feel. The first hours · After just 20 minutes, your blood pressure and heart rate go down. That means there's less stress on your heart and blood vessels. · Within 12 hours, the level of carbon monoxide in your blood drops back to normal. That makes room for more oxygen. With more oxygen in your body, you may notice that you have more energy than when you smoked. After 2 weeks · Your lungs start to work better. · Your risk of heart attack starts to drop. After 1 month · When your lungs are clear, you cough less and breathe deeper, so it's easier to be active. · Your sense of taste and smell return. That means you can enjoy food more than you have since you started smoking. Over the years · After 1 year, your risk of heart disease is half what it would be if you kept smoking. · After 5 years, your risk of stroke starts to shrink. Within a few years after that, it's about the same as if you'd never smoked. · After 10 years, your risk of dying from lung cancer is cut by about half. And your risk for many other types of cancer is lower too. How would quitting help others in your life? When you quit smoking, you improve the health of everyone who now breathes in your smoke. · Their heart, lung, and cancer risks drop, much like yours. · They are sick less. For babies and small children, living smoke-free means they're less likely to have ear infections, pneumonia, and bronchitis. · If you're a woman who is or will be pregnant someday, quitting smoking means a healthier . · Children who are close to you are less likely to become adult smokers. Where can you learn more? Go to http://jonatan-donna.info/. Enter [phone] 72 11 in the search box to learn more about \"Learning About Benefits From Quitting Smoking. \" 
 Current as of: November 29, 2017 Content Version: 11.7 © 5938-7632 [employer]. Care instructions adapted under license by [employer] (which disclaims liability or warranty for this information). If you have questions about a medical condition or this instruction, always ask your healthcare professional. [address] any warranty or liability for your use of this information. Please provide this summary of care documentation to your next provider. Your primary care clinician is listed as Gay Navarro. If you have any questions after today's visit, please call [phone].

## 2018-10-04 LAB
ALBUMIN SERPL-MCNC: 4.5 G/DL (ref 3.6–4.8)
ALBUMIN/GLOB SERPL: 1.9 {RATIO} (ref 1.2–2.2)
ALP SERPL-CCNC: 122 IU/L (ref 39–117)
ALT SERPL-CCNC: 12 IU/L (ref 0–44)
AST SERPL-CCNC: 18 IU/L (ref 0–40)
BILIRUB SERPL-MCNC: 0.3 MG/DL (ref 0–1.2)
BUN SERPL-MCNC: 14 MG/DL (ref 8–27)
BUN/CREAT SERPL: 14 (ref 10–24)
CALCIUM SERPL-MCNC: 8.8 MG/DL (ref 8.6–10.2)
CHLORIDE SERPL-SCNC: 102 MMOL/L (ref 96–106)
CHOLEST SERPL-MCNC: 189 MG/DL (ref 100–199)
CO2 SERPL-SCNC: 23 MMOL/L (ref 20–29)
CREAT SERPL-MCNC: 0.97 MG/DL (ref 0.76–1.27)
ERYTHROCYTE [DISTWIDTH] IN BLOOD BY AUTOMATED COUNT: 13.5 % (ref 12.3–15.4)
GLOBULIN SER CALC-MCNC: 2.4 G/DL (ref 1.5–4.5)
GLUCOSE SERPL-MCNC: 91 MG/DL (ref 65–99)
HCT VFR BLD AUTO: 44.2 % (ref 37.5–51)
HDLC SERPL-MCNC: 47 MG/DL
HGB BLD-MCNC: 14.8 G/DL (ref 13–17.7)
LDLC SERPL CALC-MCNC: 119 MG/DL (ref 0–99)
MCH RBC QN AUTO: 31.5 PG (ref 26.6–33)
MCHC RBC AUTO-ENTMCNC: 33.5 G/DL (ref 31.5–35.7)
MCV RBC AUTO: 94 FL (ref 79–97)
PLATELET # BLD AUTO: 216 X10E3/UL (ref 150–379)
POTASSIUM SERPL-SCNC: 4.9 MMOL/L (ref 3.5–5.2)
PROT SERPL-MCNC: 6.9 G/DL (ref 6–8.5)
RBC # BLD AUTO: 4.7 X10E6/UL (ref 4.14–5.8)
SODIUM SERPL-SCNC: 141 MMOL/L (ref 134–144)
TRIGL SERPL-MCNC: 116 MG/DL (ref 0–149)
TSH SERPL DL<=0.005 MIU/L-ACNC: 1.61 UIU/ML (ref 0.45–4.5)
VLDLC SERPL CALC-MCNC: 23 MG/DL (ref 5–40)
WBC # BLD AUTO: 4.8 X10E3/UL (ref 3.4–10.8)

## 2018-10-05 ENCOUNTER — TELEPHONE (OUTPATIENT)
Dept: FAMILY MEDICINE CLINIC | Age: 63
End: 2018-10-05

## 2018-10-05 RX ORDER — IBUPROFEN 200 MG
1 TABLET ORAL EVERY 24 HOURS
Qty: 30 PATCH | Refills: 0 | Status: SHIPPED | OUTPATIENT
Start: 2018-10-05 | End: 2018-11-04

## 2018-10-05 NOTE — TELEPHONE ENCOUNTER
Called patient to advise of results per DR. Calin Barnes, he expressed understanding, and is requesting medication to stop smoking. Requested Prescriptions     Signed Prescriptions Disp Refills    nicotine (NICODERM CQ) 21 mg/24 hr 30 Patch 0     Si Patch by TransDERmal route every twenty-four (24) hours for 30 days. Authorizing Provider: Neo Correa     Ordering User: Endy perdomo per Dr. Calin Barnes.

## 2018-10-05 NOTE — TELEPHONE ENCOUNTER
----- Message from Harjinder Chau MD sent at 10/5/2018  1:10 PM EDT -----  CXR shows: clear but hyperinflated lungs. This is consistent with COPD.

## 2018-10-17 ENCOUNTER — OFFICE VISIT (OUTPATIENT)
Dept: FAMILY MEDICINE CLINIC | Age: 63
End: 2018-10-17

## 2018-10-17 VITALS
OXYGEN SATURATION: 95 % | HEIGHT: 68 IN | SYSTOLIC BLOOD PRESSURE: 131 MMHG | RESPIRATION RATE: 20 BRPM | WEIGHT: 209 LBS | TEMPERATURE: 98.7 F | BODY MASS INDEX: 31.67 KG/M2 | HEART RATE: 57 BPM | DIASTOLIC BLOOD PRESSURE: 78 MMHG

## 2018-10-17 DIAGNOSIS — Z00.00 ROUTINE GENERAL MEDICAL EXAMINATION AT HEALTH CARE FACILITY: Primary | ICD-10-CM

## 2018-10-17 DIAGNOSIS — Z13.31 SCREENING FOR DEPRESSION: ICD-10-CM

## 2018-10-17 DIAGNOSIS — Z13.39 SCREENING FOR ALCOHOLISM: ICD-10-CM

## 2018-10-17 NOTE — ACP (ADVANCE CARE PLANNING)
Advance directive discussed at length with patient. Written information and form provided to patient. He says he will complete form and bring to office.

## 2018-10-17 NOTE — PATIENT INSTRUCTIONS

## 2018-10-17 NOTE — PROGRESS NOTES
1. Have you been to the ER, urgent care clinic since your last visit? Hospitalized since your last visit? No 
 
2. Have you seen or consulted any other health care providers outside of the [address] [place] [address] since your last visit? Include any pap smears or colon screening. No 
Reviewed record in preparation for visit and have necessary documentation Pt did not bring medication to office visit for review Goals that were addressed and/or need to be completed during or after this appointment include Health Maintenance Due Topic Date Due  Shingrix Vaccine Age 50> (1 of 2) 09/12/2007  FOBT Q 1 YEAR AGE 50-75  12/10/2015  MEDICARE YEARLY EXAM  09/19/2018

## 2018-11-07 DIAGNOSIS — I10 ESSENTIAL HYPERTENSION: ICD-10-CM

## 2018-11-07 RX ORDER — METOPROLOL TARTRATE 25 MG/1
TABLET, FILM COATED ORAL
Qty: 30 TAB | Refills: 2 | Status: SHIPPED | OUTPATIENT
Start: 2018-11-07 | End: 2019-01-30 | Stop reason: SDUPTHER

## 2018-11-07 NOTE — TELEPHONE ENCOUNTER
Pt is requesting a Rx refill on \"Metoprolol-Tart rte 25mg\" be called into [address] listed on file. Pt advised that he is completely out of medicine.      Best [phone]

## 2018-11-13 DIAGNOSIS — R56.9 CONVULSIONS, UNSPECIFIED CONVULSION TYPE (HCC): ICD-10-CM

## 2018-11-13 RX ORDER — CARBAMAZEPINE 100 MG/1
TABLET, CHEWABLE ORAL
Qty: 90 TAB | Refills: 3 | Status: SHIPPED | OUTPATIENT
Start: 2018-11-13 | End: 2019-02-13 | Stop reason: SDUPTHER

## 2018-11-19 DIAGNOSIS — S09.90XA DEPRESSION DUE TO HEAD INJURY: ICD-10-CM

## 2018-11-19 DIAGNOSIS — F32.A DEPRESSION DUE TO HEAD INJURY: ICD-10-CM

## 2018-11-19 RX ORDER — FLUOXETINE HYDROCHLORIDE 20 MG/1
CAPSULE ORAL
Qty: 30 CAP | Refills: 3 | Status: SHIPPED | OUTPATIENT
Start: 2018-11-19 | End: 2019-03-25 | Stop reason: SDUPTHER

## 2018-11-20 RX ORDER — PRAVASTATIN SODIUM 10 MG/1
TABLET ORAL
Qty: 30 TAB | Refills: 5 | Status: SHIPPED | OUTPATIENT
Start: 2018-11-20 | End: 2019-04-30 | Stop reason: SDUPTHER

## 2018-12-27 ENCOUNTER — OFFICE VISIT (OUTPATIENT)
Dept: FAMILY MEDICINE CLINIC | Age: 63
End: 2018-12-27

## 2018-12-27 VITALS
WEIGHT: 211 LBS | HEIGHT: 68 IN | HEART RATE: 58 BPM | RESPIRATION RATE: 18 BRPM | OXYGEN SATURATION: 96 % | TEMPERATURE: 98.8 F | DIASTOLIC BLOOD PRESSURE: 89 MMHG | SYSTOLIC BLOOD PRESSURE: 151 MMHG | BODY MASS INDEX: 31.98 KG/M2

## 2018-12-27 DIAGNOSIS — I10 ESSENTIAL HYPERTENSION: ICD-10-CM

## 2018-12-27 DIAGNOSIS — J31.0 CHRONIC RHINITIS: ICD-10-CM

## 2018-12-27 DIAGNOSIS — R06.02 SHORTNESS OF BREATH: Primary | ICD-10-CM

## 2018-12-27 DIAGNOSIS — Z87.820 HISTORY OF TRAUMATIC BRAIN INJURY: ICD-10-CM

## 2018-12-27 RX ORDER — GUAIFENESIN 600 MG/1
600 TABLET, EXTENDED RELEASE ORAL 2 TIMES DAILY
COMMUNITY
End: 2019-04-30

## 2018-12-27 RX ORDER — LORATADINE 10 MG/1
10 TABLET ORAL DAILY
Qty: 30 TAB | Refills: 3 | Status: SHIPPED | OUTPATIENT
Start: 2018-12-27 | End: 2019-06-18 | Stop reason: SDUPTHER

## 2018-12-27 RX ORDER — ONDANSETRON 4 MG/1
TABLET, ORALLY DISINTEGRATING ORAL
COMMUNITY
Start: 2018-12-24 | End: 2019-01-23 | Stop reason: SDUPTHER

## 2018-12-27 RX ORDER — ALBUTEROL SULFATE 90 UG/1
2 AEROSOL, METERED RESPIRATORY (INHALATION)
Qty: 1 INHALER | Refills: 0 | Status: SHIPPED | OUTPATIENT
Start: 2018-12-27 | End: 2021-03-29

## 2018-12-27 NOTE — PROGRESS NOTES
Progress Note    Patient: Fiordaliza Thrasher. MRN: [id number]  SSN: [id number]    YOB: 1955  Age: 66 y.o. Sex: male        Subjective:     Chief Complaint   Patient presents with   [facility] Follow Up       HPI: he is a 66y.o. year old male who presents for follow up of ED encounter for productive cough and nausea with vomiting. Prescribed zofran and guaifenesin. Patient with hx of skull fracture with resultant depression, neurogenic pain and hearing loss. He now has a hearing aids. He says he mood is good with current dose of fluoxetine. Patient denies HA,  CP, abdominal pain, dysuria, myalgias or arthralgias. He has stopped smoking/     Hypertension:  The patient reports:  taking medications as instructed, no medication side effects noted, no TIA's, no chest pain on exertion, notes new dyspnea on exertion, no swelling of ankles. BP Readings from Last 3 Encounters:   12/27/18 151/89   10/17/18 131/78   10/03/18 147/90     Patient advised to log blood pressures at home 2-3 times weekly and bring to next visit. Call office as soon as possible if BP's over 140/90 on multiple occasions or with symptoms of dizziness, chest pain, shortness of breath, headache or ankle swelling. Our goal is to normalize the blood pressure to decrease the risks of strokes and heart attacks. The patient is in agreement with the plan. Hyperlipidemia    Cardiovascular risks for him are: hypertension, hyperlipidemia, obesity. Former smoker    Currently he takes Pravachol (pravastatin)     Our goal is to lower hyperlipidemia to decrease the risks of strokes and heart attacks    Current and past medical information:    Current Medications after this visit[de-identified]     Current Outpatient Medications   Medication Sig    ondansetron (ZOFRAN ODT) 4 mg disintegrating tablet     guaiFENesin ER (MUCINEX) 600 mg ER tablet Take 600 mg by mouth two (2) times a day.  loratadine (CLARITIN) 10 mg tablet Take 1 Tab by mouth daily.  albuterol (PROVENTIL HFA, VENTOLIN HFA, PROAIR HFA) 90 mcg/actuation inhaler Take 2 Puffs by inhalation every four (4) hours as needed for Shortness of Breath.  budesonide (PULMICORT FLEXHALER) 90 mcg/actuation aepb inhaler Take 1 Puff by inhalation two (2) times a day.  losartan (COZAAR) 100 mg tablet TAKE ONE TABLET BY MOUTH DAILY    pravastatin (PRAVACHOL) 10 mg tablet TAKE ONE TABLET BY MOUTH NIGHTLY    FLUoxetine (PROZAC) 20 mg capsule TAKE ONE CAPSULE BY MOUTH EVERY DAY    carBAMazepine (TEGRETOL) 100 mg chewable tablet CHEW & SWALLOW 1 TABLET BY MOUTH 3 TIMES A DAY    metoprolol tartrate (LOPRESSOR) 25 mg tablet TAKE 1/2 TABLET BY MOUTH TWICE DAILY    pravastatin (PRAVACHOL) 10 mg tablet TAKE ONE TABLET BY MOUTH NIGHTLY    aspirin delayed-release 81 mg tablet Take  by mouth daily. No current facility-administered medications for this visit. Patient Active Problem List    Diagnosis Date Noted    Recurrent depression ([facility] 2018    Skull fracture ([facility] 2014    Depression due to head injury 2014    Allergic arthritis involving hand 2014    Neurogenic pain 2014    Hearing loss of both ears 2013    Hyperlipidemia 2013    Back pain 2012    Chronic SI joint pain 2012    Chest pain, unspecified 2012    Hand pain 2012    Tobacco abuse 2012    HTN (hypertension) 2012    Tinnitus 2012       Past Medical History:   Diagnosis Date    H/O seasonal allergies     Hypertension        No Known Allergies    No past surgical history on file.     Social History     Socioeconomic History    Marital status: SINGLE     Spouse name: Not on file    Number of children: Not on file    Years of education: Not on file    Highest education level: Not on file   Tobacco Use    Smoking status: Former Smoker     Packs/day: 1.00     Types: Cigarettes     Last attempt to quit: 10/6/2018     Years since quittin.2  Smokeless tobacco: Never Used   Substance and Sexual Activity    Alcohol use: No    Drug use: No         Objective:     Review of Systems:  Constitutional: Negative for fatigue or malaise  Derm: Negative for rash or lesion  HEENT: Negative for acute hearing or vision changes  Cardiovascular: Negative for dizziness, chest pain or palpitations  Respiratory: Positive for productive cough and wheezing  Gastreintestinal: Negative for N/V/D/C  Genital/urinary: Negative for dysuria or voiding dysfunction  Muscoloskeletal: Negative for myalgias or arthralgias   Neurological: Negative for headache, weakness or paresthesia  Psychological: see HPI      Vitals:    12/27/18 1136 12/27/18 1204   BP: (!) 165/105 151/89   Pulse: (!) 58    Resp: 18    Temp: 98.8 °F (37.1 °C)    TempSrc: Oral    SpO2: 96%    Weight: 211 lb (95.7 kg)    Height: 5' 8\" (1.727 m)       Body mass index is 32.08 kg/m². Physical Exam:  Constitutional: well developed, well nourished, in no acute distress  Skin: warm and dry, normal tone and turgor  Head: normocephalic, atraumatic  Eyes: sclera clear, EOMI  Neck: normal range of motion  CV: normal S1, S2, regular rate and rhythm  Respiratory: prolonged expiration with symmetrical effort  Abdomen: soft, BS normal  Extremities: full range of motion  Neurology: normal strength and sensation  Psych: active, alert and oriented, affect appropriate       Assessment and orders:     Diagnoses and all orders for this visit:    1. Shortness of breath  -     albuterol (PROVENTIL HFA, VENTOLIN HFA, PROAIR HFA) 90 mcg/actuation inhaler; Take 2 Puffs by inhalation every four (4) hours as needed for Shortness of Breath. -     budesonide (PULMICORT FLEXHALER) 90 mcg/actuation aepb inhaler; Take 1 Puff by inhalation two (2) times a day. 2. Chronic rhinitis  -     loratadine (CLARITIN) 10 mg tablet; Take 1 Tab by mouth daily.       Plan of care:  Discussed that patient respiratory symptoms consistent with COPD.  Medication risks/benefits/side effects discussed with patient. Will continue current of fluoxetine   Continue other current prescribed medications as written. All of the patient's questions were addressed. The patient understands and agrees with our plan of care. The patient knows to call back if they are unsure of or forget any changes we discussed today or if the symptoms change.   The patient received an After-Visit Summary which contains VS, orders, medication list and allergy list.     Patient Care Team:  Shireen Landeros MD as PCP - General (Family Practice)  Jay Houston MD (Rheumatology)  Ruchi Cedeño MD (Otolaryngology)  Wade Winchester MD (Cardiology)  Yemi Galaviz MD (Neurology)    Follow-up Disposition: Not on File    Future Appointments   Date Time Provider Vanessa Brenda   1/23/2019  9:40 AM Shireen Landeros MD [facility]       Signed By: Diana Lynn MD     December 27, 2018

## 2018-12-27 NOTE — PROGRESS NOTES
1. Have you been to the ER, urgent care clinic since your last visit? Hospitalized since your last visit? No    2. Have you seen or consulted any other health care providers outside of the [address] [place] [address] since your last visit? Include any pap smears or colon screening.  No  Reviewed record in preparation for visit and have necessary documentation  Pt did not bring medication to office visit for review  opportunity was given for questions  Goals that were addressed and/or need to be completed during or after this appointment include    Health Maintenance Due   Topic Date Due    Shingrix Vaccine Age 50> (1 of 2) 09/12/2007    FOBT Q 1 YEAR AGE 50-75  12/10/2015

## 2018-12-27 NOTE — PATIENT INSTRUCTIONS
Low Sodium Diet (2,000 Milligram): Care Instructions  Your Care Instructions    Too much sodium causes your body to hold on to extra water. This can raise your blood pressure and force your heart and kidneys to work harder. In very serious cases, this could cause you to be put in the hospital. It might even be life-threatening. By limiting sodium, you will feel better and lower your risk of serious problems. The most common source of sodium is salt. People get most of the salt in their diet from canned, prepared, and packaged foods. Fast food and restaurant meals also are very high in sodium. Your doctor will probably limit your sodium to less than 2,000 milligrams (mg) a day. This limit counts all the sodium in prepared and packaged foods and any salt you add to your food. Follow-up care is a key part of your treatment and safety. Be sure to make and go to all appointments, and call your doctor if you are having problems. It's also a good idea to know your test results and keep a list of the medicines you take. How can you care for yourself at home? Read food labels  · Read labels on cans and food packages. The labels tell you how much sodium is in each serving. Make sure that you look at the serving size. If you eat more than the serving size, you have eaten more sodium. · Food labels also tell you the Percent Daily Value for sodium. Choose products with low Percent Daily Values for sodium. · Be aware that sodium can come in forms other than salt, including monosodium glutamate (MSG), sodium citrate, and sodium bicarbonate (baking soda). MSG is often added to Asian food. When you eat out, you can sometimes ask for food without MSG or added salt. Buy low-sodium foods  · Buy foods that are labeled \"unsalted\" (no salt added), \"sodium-free\" (less than 5 mg of sodium per serving), or \"low-sodium\" (less than 140 mg of sodium per serving).  Foods labeled \"reduced-sodium\" and \"light sodium\" may still have too much sodium. Be sure to read the label to see how much sodium you are getting. · Buy fresh vegetables, or frozen vegetables without added sauces. Buy low-sodium versions of canned vegetables, soups, and other canned goods. Prepare low-sodium meals  · Cut back on the amount of salt you use in cooking. This will help you adjust to the taste. Do not add salt after cooking. One teaspoon of salt has about 2,300 mg of sodium. · Take the salt shaker off the table. · Flavor your food with garlic, lemon juice, onion, vinegar, herbs, and spices. Do not use soy sauce, lite soy sauce, steak sauce, onion salt, garlic salt, celery salt, mustard, or ketchup on your food. · Use low-sodium salad dressings, sauces, and ketchup. Or make your own salad dressings and sauces without adding salt. · Use less salt (or none) when recipes call for it. You can often use half the salt a recipe calls for without losing flavor. Other foods such as rice, pasta, and grains do not need added salt. · Rinse canned vegetables, and cook them in fresh water. This removes some--but not all--of the salt. · Avoid water that is naturally high in sodium or that has been treated with water softeners, which add sodium. Call your local water company to find out the sodium content of your water supply. If you buy bottled water, read the label and choose a sodium-free brand. Avoid high-sodium foods  · Avoid eating:  ? Smoked, cured, salted, and canned meat, fish, and poultry. ? Ham, sandoval, hot dogs, and luncheon meats. ? Regular, hard, and processed cheese and regular peanut butter. ? Crackers with salted tops, and other salted snack foods such as pretzels, chips, and salted popcorn. ? Frozen prepared meals, unless labeled low-sodium. ? Canned and dried soups, broths, and bouillon, unless labeled sodium-free or low-sodium. ? Canned vegetables, unless labeled sodium-free or low-sodium. ? [place] fries, pizza, tacos, and other fast foods.   ? Precilla Rose, olives, ketchup, and other condiments, especially soy sauce, unless labeled sodium-free or low-sodium. Where can you learn more? Go to http://jonatan-donna.info/. Enter W154 in the search box to learn more about \"Low Sodium Diet (2,000 Milligram): Care Instructions. \"  Current as of: March 29, 2018  Content Version: 11.8  © 8464-6409 [employer]. Care instructions adapted under license by [employer] (which disclaims liability or warranty for this information). If you have questions about a medical condition or this instruction, always ask your healthcare professional. [address] any warranty or liability for your use of this information.

## 2019-01-02 ENCOUNTER — TELEPHONE (OUTPATIENT)
Dept: FAMILY MEDICINE CLINIC | Age: 64
End: 2019-01-02

## 2019-01-02 DIAGNOSIS — I10 ESSENTIAL HYPERTENSION WITH GOAL BLOOD PRESSURE LESS THAN 140/90: ICD-10-CM

## 2019-01-02 NOTE — TELEPHONE ENCOUNTER
Pt's wife called and states Pt is vomiting again like when he went to the hospital. She gave him the medication for it. She believes he probably threw it up. Can she give him another or one half?  Gave message to Nurse Susan Barr

## 2019-01-02 NOTE — TELEPHONE ENCOUNTER
Spoke with patient's caretaker who states patient threw up this morning and she gave him a Zofran but she is afraid he threw this up. Caretaker gave him another Zofran and wanted to make sure this was ok. Dr Rafal Kline stated this was ok. Patient to proceed to ER if no improvement.

## 2019-01-03 RX ORDER — LOSARTAN POTASSIUM 100 MG/1
100 TABLET ORAL DAILY
Qty: 30 TAB | Refills: 3 | Status: SHIPPED | OUTPATIENT
Start: 2019-01-03 | End: 2019-05-10 | Stop reason: SDUPTHER

## 2019-01-23 ENCOUNTER — OFFICE VISIT (OUTPATIENT)
Dept: FAMILY MEDICINE CLINIC | Age: 64
End: 2019-01-23

## 2019-01-23 VITALS
RESPIRATION RATE: 20 BRPM | DIASTOLIC BLOOD PRESSURE: 88 MMHG | BODY MASS INDEX: 31.98 KG/M2 | SYSTOLIC BLOOD PRESSURE: 153 MMHG | TEMPERATURE: 99 F | HEIGHT: 68 IN | HEART RATE: 78 BPM | OXYGEN SATURATION: 99 % | WEIGHT: 211 LBS

## 2019-01-23 DIAGNOSIS — I10 ESSENTIAL HYPERTENSION: ICD-10-CM

## 2019-01-23 DIAGNOSIS — R11.2 NAUSEA AND VOMITING, INTRACTABILITY OF VOMITING NOT SPECIFIED, UNSPECIFIED VOMITING TYPE: Primary | ICD-10-CM

## 2019-01-23 DIAGNOSIS — R56.9 CONVULSIONS, UNSPECIFIED CONVULSION TYPE (HCC): ICD-10-CM

## 2019-01-23 LAB
BILIRUB UR QL STRIP: NEGATIVE
GLUCOSE UR-MCNC: NEGATIVE MG/DL
KETONES P FAST UR STRIP-MCNC: NEGATIVE MG/DL
PH UR STRIP: 7 [PH] (ref 4.6–8)
PROT UR QL STRIP: NEGATIVE
QUICKVUE INFLUENZA TEST: NEGATIVE
SP GR UR STRIP: 1.01 (ref 1–1.03)
UA UROBILINOGEN AMB POC: NORMAL (ref 0.2–1)
URINALYSIS CLARITY POC: CLEAR
URINALYSIS COLOR POC: YELLOW
URINE BLOOD POC: NEGATIVE
URINE LEUKOCYTES POC: NEGATIVE
URINE NITRITES POC: NEGATIVE
VALID INTERNAL CONTROL?: YES

## 2019-01-23 RX ORDER — OMEPRAZOLE 40 MG/1
40 CAPSULE, DELAYED RELEASE ORAL DAILY
Qty: 30 CAP | Refills: 3 | Status: SHIPPED | OUTPATIENT
Start: 2019-01-23 | End: 2019-04-25 | Stop reason: SDUPTHER

## 2019-01-23 RX ORDER — ONDANSETRON 8 MG/1
8 TABLET, ORALLY DISINTEGRATING ORAL
Qty: 1 TAB | Refills: 0
Start: 2019-01-23 | End: 2019-01-23 | Stop reason: SDUPTHER

## 2019-01-23 RX ORDER — ONDANSETRON 8 MG/1
8 TABLET, ORALLY DISINTEGRATING ORAL
Qty: 30 TAB | Refills: 1 | Status: SHIPPED | OUTPATIENT
Start: 2019-01-23 | End: 2019-02-21 | Stop reason: SDUPTHER

## 2019-01-23 NOTE — PROGRESS NOTES
1. Have you been to the ER, urgent care clinic since your last visit? Hospitalized since your last visit? yes [facility] ED 2. Have you seen or consulted any other health care providers outside of the [address] [place] [address] since your last visit? Include any pap smears or colon screening. No 
Reviewed record in preparation for visit and have necessary documentation Pt did not bring medication to office visit for review Goals that were addressed and/or need to be completed during or after this appointment include There are no preventive care reminders to display for this patient.

## 2019-01-25 NOTE — PROGRESS NOTES
Progress Note Patient: Cyndi Henao. MRN: [id number]  SSN: [id number] YOB: 1955  Age: 66 y.o. Sex: male Subjective: Chief Complaint Patient presents with  Nausea  
  vomiting, diarrhea, dizziness  Hypertension  Cholesterol Problem HPI: he is a 66y.o. year old male who presents with complaint of dizziness, vomiting and diarrhea. Recently seen in ED for same sans identifiable etiology. Patient with hx of skull fracture with resultant depression, neurogenic pain and hearing loss. He now has a hearing aids. He says he mood is good with current dose of fluoxetine. Patient denies HA,  CP,  dysuria, myalgias or arthralgias. He has stopped smoking. Hypertension: The patient reports:  taking medications as instructed, no medication side effects noted, no TIA's, no chest pain on exertion, notes new dyspnea on exertion, no swelling of ankles. BP Readings from Last 3 Encounters:  
01/23/19 153/88  
12/27/18 151/89  
10/17/18 131/78 Patient advised to log blood pressures at home 2-3 times weekly and bring to next visit. Call office as soon as possible if BP's over 140/90 on multiple occasions or with symptoms of dizziness, chest pain, shortness of breath, headache or ankle swelling. Our goal is to normalize the blood pressure to decrease the risks of strokes and heart attacks. The patient is in agreement with the plan. Current and past medical information: 
 
Current Medications after this visit[de-identified]    
Current Outpatient Medications Medication Sig  
 ondansetron (ZOFRAN ODT) 8 mg disintegrating tablet Take 1 Tab by mouth every eight (8) hours as needed for Nausea.  omeprazole (PRILOSEC) 40 mg capsule Take 1 Cap by mouth daily.  losartan (COZAAR) 100 mg tablet Take 1 Tab by mouth daily.  guaiFENesin ER (MUCINEX) 600 mg ER tablet Take 600 mg by mouth two (2) times a day.  loratadine (CLARITIN) 10 mg tablet Take 1 Tab by mouth daily.  albuterol (PROVENTIL HFA, VENTOLIN HFA, PROAIR HFA) 90 mcg/actuation inhaler Take 2 Puffs by inhalation every four (4) hours as needed for Shortness of Breath.  pravastatin (PRAVACHOL) 10 mg tablet TAKE ONE TABLET BY MOUTH NIGHTLY  FLUoxetine (PROZAC) 20 mg capsule TAKE ONE CAPSULE BY MOUTH EVERY DAY  carBAMazepine (TEGRETOL) 100 mg chewable tablet CHEW & SWALLOW 1 TABLET BY MOUTH 3 TIMES A DAY  metoprolol tartrate (LOPRESSOR) 25 mg tablet TAKE 1/2 TABLET BY MOUTH TWICE DAILY  aspirin delayed-release 81 mg tablet Take  by mouth daily.  budesonide (PULMICORT FLEXHALER) 90 mcg/actuation aepb inhaler Take 1 Puff by inhalation two (2) times a day. No current facility-administered medications for this visit. Patient Active Problem List  
 Diagnosis Date Noted  Recurrent depression ([facility] [address] 2018  Skull fracture ([facility] [address] 2014  Depression due to head injury 2014  Allergic arthritis involving hand 2014  Neurogenic pain 2014  Hearing loss of both ears 2013  Hyperlipidemia 2013  Back pain 2012  Chronic SI joint pain 2012  Chest pain, unspecified 2012  Hand pain 2012  Tobacco abuse 2012  
 HTN (hypertension) 2012  Tinnitus 2012 Past Medical History:  
Diagnosis Date  H/O seasonal allergies  Hypertension No Known Allergies No past surgical history on file. Social History Socioeconomic History  Marital status: SINGLE Spouse name: Not on file  Number of children: Not on file  Years of education: Not on file  Highest education level: Not on file Tobacco Use  Smoking status: Former Smoker Packs/day: 1.00 Types: Cigarettes Last attempt to quit: 10/6/2018 Years since quittin.3  Smokeless tobacco: Never Used Substance and Sexual Activity  Alcohol use: No  
 Drug use: No  
 
 
 
Objective: Review of Systems: 
Constitutional: Negative for fatigue or malaise Derm: Negative for rash or lesion HEENT: Negative for acute hearing or vision changes Cardiovascular: Negative for dizziness, chest pain or palpitations Respiratory: Positive for productive cough and wheezing Gastreintestinal: Negative for N/V/D/C Genital/urinary: Negative for dysuria or voiding dysfunction Muscoloskeletal: Negative for myalgias or arthralgias Neurological: Negative for headache, weakness or paresthesia Psychological: see HPI Vitals:  
 01/23/19 5990 BP: 153/88 Pulse: 78 Resp: 20 Temp: 99 °F (37.2 °C) TempSrc: Oral  
SpO2: 99% Weight: 211 lb (95.7 kg) Height: 5' 8\" (1.727 m) Body mass index is 32.08 kg/m². Physical Exam: 
Constitutional: well developed, well nourished, in no acute distress Skin: warm and dry, normal tone and turgor Head: normocephalic, atraumatic Eyes: sclera clear, EOMI Neck: normal range of motion CV: normal S1, S2, regular rate and rhythm Respiratory: prolonged expiration with symmetrical effort Abdomen: soft, BS normal 
Extremities: full range of motion Neurology: normal strength and sensation Psych: active, alert and oriented, affect appropriate Assessment and orders:  
 
Diagnoses and all orders for this visit: 
 
1. Nausea and vomiting, intractability of vomiting not specified, unspecified vomiting type -     AMB POC RAPID INFLUENZA TEST 
-     LIPASE 
-     AMYLASE 
-     METABOLIC PANEL, COMPREHENSIVE 
-     CBC W/O DIFF 
-     H. PYLORI ABS, IGG, IGA, IGM 
-     AMB POC URINALYSIS DIP STICK AUTO W/O MICRO 
-     ondansetron (ZOFRAN ODT) 8 mg disintegrating tablet; Take 1 Tab by mouth every eight (8) hours as needed for Nausea. -     omeprazole (PRILOSEC) 40 mg capsule; Take 1 Cap by mouth daily. 2. Essential hypertension -     METABOLIC PANEL, COMPREHENSIVE 
-     TSH 3RD GENERATION 3. Convulsions, unspecified convulsion type ([address] -     CARBAMAZEPINE Patient left clinic without getting ordered lab work. Plan of care: 
Discussed that patient respiratory symptoms consistent with COPD. Medication risks/benefits/side effects discussed with patient. Will continue current of fluoxetine Continue other current prescribed medications as written. All of the patient's questions were addressed. The patient understands and agrees with our plan of care. The patient knows to call back if they are unsure of or forget any changes we discussed today or if the symptoms change. The patient received an After-Visit Summary which contains VS, orders, medication list and allergy list.  
 
Patient Care Team: 
Josep Benjamin MD as PCP - [facility]) Petar Santos MD (Rheumatology) Bethanie Nesbitt MD (Otolaryngology) Odilon Jason MD (Cardiology) Julia Martin MD (Neurology) Follow-up Disposition: 
Return in about 1 week (around 1/30/2019), or if symptoms worsen or fail to improve. Future Appointments Date Time Provider Vanessa Gutierrez 1/30/2019  2:10 PM Josep Benjamin MD [facility] Signed By: Yuriy Ordonez MD   
 January 24, 2019

## 2019-01-30 ENCOUNTER — OFFICE VISIT (OUTPATIENT)
Dept: FAMILY MEDICINE CLINIC | Age: 64
End: 2019-01-30

## 2019-01-30 VITALS
TEMPERATURE: 98.1 F | HEIGHT: 68 IN | RESPIRATION RATE: 20 BRPM | DIASTOLIC BLOOD PRESSURE: 89 MMHG | WEIGHT: 208.8 LBS | BODY MASS INDEX: 31.64 KG/M2 | HEART RATE: 64 BPM | OXYGEN SATURATION: 97 % | SYSTOLIC BLOOD PRESSURE: 138 MMHG

## 2019-01-30 DIAGNOSIS — Z87.820 HISTORY OF TRAUMATIC BRAIN INJURY: ICD-10-CM

## 2019-01-30 DIAGNOSIS — F33.9 RECURRENT DEPRESSION (HCC): ICD-10-CM

## 2019-01-30 DIAGNOSIS — I10 ESSENTIAL HYPERTENSION: Primary | ICD-10-CM

## 2019-01-30 RX ORDER — METOPROLOL TARTRATE 25 MG/1
TABLET, FILM COATED ORAL
Qty: 30 TAB | Refills: 2 | Status: SHIPPED | OUTPATIENT
Start: 2019-01-30 | End: 2019-04-08 | Stop reason: SDUPTHER

## 2019-01-30 NOTE — PROGRESS NOTES
Progress Note    Patient: Oren Case. MRN: [id number]  SSN: [id number]    YOB: 1955  Age: 66 y.o. Sex: male        Subjective:     Chief Complaint   Patient presents with    Nausea     1 week follow up        HPI: he is a 66y.o. year old male who presents for follow up of dizziness, vomiting and diarrhea. Seen in ED for same sans identifiable etiology. He says symptoms much improved. Patient with hx of skull fracture with resultant depression, neurogenic pain and hearing loss. He has a hearing aids. He says he mood is stable with current dose of fluoxetine. Patient denies HA, CP,  dysuria, acute myalgias or arthralgias. He has stopped smoking. Hypertension:  The patient reports he is taking medications as instructed, no medication side effects noted, no TIA's, no chest pain on exertion, notes new dyspnea on exertion, no swelling of ankles. BP Readings from Last 3 Encounters:   01/30/19 138/89   01/23/19 153/88   12/27/18 151/89     Patient advised to log blood pressures at home 2-3 times weekly and bring to next visit. Call office as soon as possible if BP's over 140/90 on multiple occasions or with symptoms of dizziness, chest pain, shortness of breath, headache or ankle swelling. Our goal is to normalize the blood pressure to decrease the risks of strokes and heart attacks. The patient is in agreement with the plan. Current and past medical information:    Current Medications after this visit[de-identified]     Current Outpatient Medications   Medication Sig    metoprolol tartrate (LOPRESSOR) 25 mg tablet TAKE 1/2 TABLET BY MOUTH TWICE DAILY    ondansetron (ZOFRAN ODT) 8 mg disintegrating tablet Take 1 Tab by mouth every eight (8) hours as needed for Nausea.  omeprazole (PRILOSEC) 40 mg capsule Take 1 Cap by mouth daily.  losartan (COZAAR) 100 mg tablet Take 1 Tab by mouth daily.  guaiFENesin ER (MUCINEX) 600 mg ER tablet Take 600 mg by mouth two (2) times a day.     loratadine (CLARITIN) 10 mg tablet Take 1 Tab by mouth daily.  albuterol (PROVENTIL HFA, VENTOLIN HFA, PROAIR HFA) 90 mcg/actuation inhaler Take 2 Puffs by inhalation every four (4) hours as needed for Shortness of Breath.  budesonide (PULMICORT FLEXHALER) 90 mcg/actuation aepb inhaler Take 1 Puff by inhalation two (2) times a day.  pravastatin (PRAVACHOL) 10 mg tablet TAKE ONE TABLET BY MOUTH NIGHTLY    FLUoxetine (PROZAC) 20 mg capsule TAKE ONE CAPSULE BY MOUTH EVERY DAY    carBAMazepine (TEGRETOL) 100 mg chewable tablet CHEW & SWALLOW 1 TABLET BY MOUTH 3 TIMES A DAY    aspirin delayed-release 81 mg tablet Take  by mouth daily. No current facility-administered medications for this visit. Patient Active Problem List    Diagnosis Date Noted    Recurrent depression ([facility] [address] 2018    Skull fracture ([facility] [address] 2014    Depression due to head injury 2014    Allergic arthritis involving hand 2014    Neurogenic pain 2014    Hearing loss of both ears 2013    Hyperlipidemia 2013    Back pain 2012    Chronic SI joint pain 2012    Chest pain, unspecified 2012    Hand pain 2012    Tobacco abuse 2012    HTN (hypertension) 2012    Tinnitus 2012       Past Medical History:   Diagnosis Date    H/O seasonal allergies     Hypertension        No Known Allergies    History reviewed. No pertinent surgical history.     Social History     Socioeconomic History    Marital status: SINGLE     Spouse name: Not on file    Number of children: Not on file    Years of education: Not on file    Highest education level: Not on file   Tobacco Use    Smoking status: Former Smoker     Packs/day: 1.00     Types: Cigarettes     Last attempt to quit: 10/6/2018     Years since quittin.3    Smokeless tobacco: Never Used   Substance and Sexual Activity    Alcohol use: No    Drug use: No         Objective:     Review of Systems:  Constitutional: Negative for fatigue or malaise  Derm: Negative for rash or lesion  HEENT: Negative for acute hearing or vision changes  Cardiovascular: Negative for dizziness, chest pain or palpitations  Respiratory: Negative for productive cough and wheezing  Gastreintestinal: Negative for N/V/D/C  Genital/urinary: Negative for dysuria or voiding dysfunction  Muscoloskeletal: Negative for myalgias or arthralgias   Neurological: see HPI, Negative for weakness or paresthesia  Psychological: see HPI      Vitals:    01/30/19 1348   BP: 138/89   Pulse: 64   Resp: 20   Temp: 98.1 °F (36.7 °C)   TempSrc: Oral   SpO2: 97%   Weight: 208 lb 12.8 oz (94.7 kg)   Height: 5' 8\" (1.727 m)      Body mass index is 31.75 kg/m². Physical Exam:  Constitutional: well developed, well nourished, in no acute distress  Skin: warm and dry, normal tone and turgor  Head: normocephalic, atraumatic  Eyes: sclera clear, EOMI  Neck: normal range of motion  CV: normal S1, S2, regular rate and rhythm  Respiratory: CTAB with symmetrical effort  Extremities: full range of motion  Neurology: normal strength and sensation  Psych: active, alert and oriented, affect appropriate       Assessment and orders:     Diagnoses and all orders for this visit:    1. Essential hypertension  -     metoprolol tartrate (LOPRESSOR) 25 mg tablet; TAKE 1/2 TABLET BY MOUTH TWICE DAILY    2. History of traumatic brain injury    3. Recurrent depression ([facility] [address]        Plan of care:  Diagnoses were discussed in detail with patient. Medication risks/benefits/side effects discussed with patient. All of the patient's questions were addressed and answered to apparent satisfaction. The patient understands and agrees with our plan of care. The patient knows to call back if they have questions about the plan of care or if symptoms change. The patient received an After-Visit Summary which contains VS, diagnoses, orders, allergy and medication lists.       Future Appointments   Date Time Provider Department Center   4/30/2019  2:10 PM Fior Lewis MD [facility]       Patient Care Team:  Fior Lewis MD as PCP - [facility])  Sunny Truong MD (Rheumatology)  Rupal Montoya MD (Otolaryngology)  Abdifatah Hanson MD (Cardiology)  Taylor Gudino MD (Neurology)    Follow-up Disposition:  Return in about 3 months (around 4/30/2019).     Future Appointments   Date Time Provider Department Center   4/30/2019  2:10 PM Fior Lewis MD [facility] SCHED       Signed By: Jossie Lambert MD     January 30, 2019

## 2019-01-30 NOTE — PATIENT INSTRUCTIONS
DASH Diet: Care Instructions  Your Care Instructions    The DASH diet is an eating plan that can help lower your blood pressure. DASH stands for Dietary Approaches to Stop Hypertension. Hypertension is high blood pressure. The DASH diet focuses on eating foods that are high in calcium, potassium, and magnesium. These nutrients can lower blood pressure. The foods that are highest in these nutrients are fruits, vegetables, low-fat dairy products, nuts, seeds, and legumes. But taking calcium, potassium, and magnesium supplements instead of eating foods that are high in those nutrients does not have the same effect. The DASH diet also includes whole grains, fish, and poultry. The DASH diet is one of several lifestyle changes your doctor may recommend to lower your high blood pressure. Your doctor may also want you to decrease the amount of sodium in your diet. Lowering sodium while following the DASH diet can lower blood pressure even further than just the DASH diet alone. Follow-up care is a key part of your treatment and safety. Be sure to make and go to all appointments, and call your doctor if you are having problems. It's also a good idea to know your test results and keep a list of the medicines you take. How can you care for yourself at home? Following the DASH diet  · Eat 4 to 5 servings of fruit each day. A serving is 1 medium-sized piece of fruit, ½ cup chopped or canned fruit, 1/4 cup dried fruit, or 4 ounces (½ cup) of fruit juice. Choose fruit more often than fruit juice. · Eat 4 to 5 servings of vegetables each day. A serving is 1 cup of lettuce or raw leafy vegetables, ½ cup of chopped or cooked vegetables, or 4 ounces (½ cup) of vegetable juice. Choose vegetables more often than vegetable juice. · Get 2 to 3 servings of low-fat and fat-free dairy each day. A serving is 8 ounces of milk, 1 cup of yogurt, or 1 ½ ounces of cheese. · Eat 6 to 8 servings of grains each day.  A serving is 1 slice of bread, 1 ounce of dry cereal, or ½ cup of cooked rice, pasta, or cooked cereal. Try to choose whole-grain products as much as possible. · Limit lean meat, poultry, and fish to 2 servings each day. A serving is 3 ounces, about the size of a deck of cards. · Eat 4 to 5 servings of nuts, seeds, and legumes (cooked dried beans, lentils, and split peas) each week. A serving is 1/3 cup of nuts, 2 tablespoons of seeds, or ½ cup of cooked beans or peas. · Limit fats and oils to 2 to 3 servings each day. A serving is 1 teaspoon of vegetable oil or 2 tablespoons of salad dressing. · Limit sweets and added sugars to 5 servings or less a week. A serving is 1 tablespoon jelly or jam, ½ cup sorbet, or 1 cup of lemonade. · Eat less than 2,300 milligrams (mg) of sodium a day. If you limit your sodium to 1,500 mg a day, you can lower your blood pressure even more. Tips for success  · Start small. Do not try to make dramatic changes to your diet all at once. You might feel that you are missing out on your favorite foods and then be more likely to not follow the plan. Make small changes, and stick with them. Once those changes become habit, add a few more changes. · Try some of the following:  ? Make it a goal to eat a fruit or vegetable at every meal and at snacks. This will make it easy to get the recommended amount of fruits and vegetables each day. ? Try yogurt topped with fruit and nuts for a snack or healthy dessert. ? Add lettuce, tomato, cucumber, and onion to sandwiches. ? Combine a ready-made pizza crust with low-fat mozzarella cheese and lots of vegetable toppings. Try using tomatoes, squash, spinach, broccoli, carrots, cauliflower, and onions. ? Have a variety of cut-up vegetables with a low-fat dip as an appetizer instead of chips and dip. ? Sprinkle sunflower seeds or chopped almonds over salads. Or try adding chopped walnuts or almonds to cooked vegetables.   ? Try some vegetarian meals using beans and peas. Add garbanzo or kidney beans to salads. Make burritos and tacos with mashed yeung beans or black beans. Where can you learn more? Go to http://jonatan-donna.info/. Enter D521 in the search box to learn more about \"DASH Diet: Care Instructions. \"  Current as of: July 22, 2018  Content Version: 11.9  © 4744-8884 [facility]. Care instructions adapted under license by [employer] (which disclaims liability or warranty for this information). If you have questions about a medical condition or this instruction, always ask your healthcare professional. [address] any warranty or liability for your use of this information.

## 2019-01-30 NOTE — PROGRESS NOTES
1. Have you been to the ER, urgent care clinic since your last visit? Hospitalized since your last visit? No    2. Have you seen or consulted any other health care providers outside of the [address] [place] [address] since your last visit? Include any pap smears or colon screening. No  Reviewed record in preparation for visit and have necessary documentation  Pt did not bring medication to office visit for review  Information was given to pt on Advanced Directives, Living Will  Information was given on Shingles Vaccine  opportunity was given for questions  Goals that were addressed and/or need to be completed during or after this appointment include     There are no preventive care reminders to display for this patient.

## 2019-02-15 ENCOUNTER — TELEPHONE (OUTPATIENT)
Dept: FAMILY MEDICINE CLINIC | Age: 64
End: 2019-02-15

## 2019-02-15 RX ORDER — POLYETHYLENE GLYCOL 3350 17 G/17G
17 POWDER, FOR SOLUTION ORAL DAILY
Qty: 10 PACKET | Refills: 0 | Status: SHIPPED | OUTPATIENT
Start: 2019-02-15 | End: 2019-04-30

## 2019-02-15 NOTE — TELEPHONE ENCOUNTER
Need a medication for constipation. He has been like this for three days. Will come in if have too. Pt is asking for a call back. Does not want to go the whole weekend.

## 2019-02-15 NOTE — TELEPHONE ENCOUNTER
Spoke with patient and advised Dr. Peter Griggs called RX to pharmacy. Patient states he is better now.

## 2019-03-15 DIAGNOSIS — R56.9 CONVULSIONS, UNSPECIFIED CONVULSION TYPE (HCC): ICD-10-CM

## 2019-03-17 RX ORDER — CARBAMAZEPINE 100 MG/1
100 TABLET, CHEWABLE ORAL 3 TIMES DAILY
Qty: 90 TAB | Refills: 2 | Status: SHIPPED | OUTPATIENT
Start: 2019-03-17 | End: 2020-02-05 | Stop reason: SDUPTHER

## 2019-03-25 DIAGNOSIS — S09.90XA DEPRESSION DUE TO HEAD INJURY: ICD-10-CM

## 2019-03-25 DIAGNOSIS — F32.A DEPRESSION DUE TO HEAD INJURY: ICD-10-CM

## 2019-03-28 RX ORDER — FLUOXETINE HYDROCHLORIDE 20 MG/1
CAPSULE ORAL
Qty: 30 CAP | Refills: 3 | Status: SHIPPED | OUTPATIENT
Start: 2019-03-28 | End: 2019-07-25 | Stop reason: SDUPTHER

## 2019-04-30 ENCOUNTER — OFFICE VISIT (OUTPATIENT)
Dept: FAMILY MEDICINE CLINIC | Age: 64
End: 2019-04-30

## 2019-04-30 VITALS
DIASTOLIC BLOOD PRESSURE: 90 MMHG | HEIGHT: 68 IN | SYSTOLIC BLOOD PRESSURE: 144 MMHG | WEIGHT: 207.8 LBS | HEART RATE: 57 BPM | OXYGEN SATURATION: 98 % | TEMPERATURE: 98.4 F | RESPIRATION RATE: 20 BRPM | BODY MASS INDEX: 31.49 KG/M2

## 2019-04-30 DIAGNOSIS — R56.9 CONVULSIONS, UNSPECIFIED CONVULSION TYPE (HCC): ICD-10-CM

## 2019-04-30 DIAGNOSIS — F32.A DEPRESSION DUE TO HEAD INJURY: ICD-10-CM

## 2019-04-30 DIAGNOSIS — Z87.820 HISTORY OF TRAUMATIC BRAIN INJURY: ICD-10-CM

## 2019-04-30 DIAGNOSIS — E78.5 HYPERLIPIDEMIA, UNSPECIFIED HYPERLIPIDEMIA TYPE: ICD-10-CM

## 2019-04-30 DIAGNOSIS — Z72.0 TOBACCO ABUSE: ICD-10-CM

## 2019-04-30 DIAGNOSIS — I10 ESSENTIAL HYPERTENSION: Primary | ICD-10-CM

## 2019-04-30 DIAGNOSIS — J31.0 CHRONIC RHINITIS: ICD-10-CM

## 2019-04-30 DIAGNOSIS — S09.90XA DEPRESSION DUE TO HEAD INJURY: ICD-10-CM

## 2019-04-30 RX ORDER — PRAVASTATIN SODIUM 10 MG/1
TABLET ORAL
Qty: 30 TAB | Refills: 5 | Status: SHIPPED | OUTPATIENT
Start: 2019-04-30 | End: 2019-05-24 | Stop reason: SDUPTHER

## 2019-04-30 NOTE — PROGRESS NOTES
Chief Complaint Patient presents with  Hypertension 3 month follow up Visit Vitals BP (!) 161/102 (BP 1 Location: Right arm, BP Patient Position: Sitting) Pulse (!) 57 Temp 98.4 °F (36.9 °C) (Oral) Resp 20 Ht 5' 8\" (1.727 m) Wt 207 lb 12.8 oz (94.3 kg) SpO2 98% BMI 31.60 kg/m² 1. Have you been to the ER, urgent care clinic since your last visit? Hospitalized since your last visit? No 
 
2. Have you seen or consulted any other health care providers outside of the [address] [place] [address] since your last visit? Include any pap smears or colon screening. No 
 
Reviewed record in preparation for visit and have necessary documentation Pt did not bring medication to office visit for review 
opportunity was given for questions Goals that were addressed and/or need to be completed during or after this appointment include Health Maintenance Due Topic Date Due  
 FOBT Q 1 YEAR AGE 50-75  12/10/2015

## 2019-04-30 NOTE — PATIENT INSTRUCTIONS

## 2019-05-01 NOTE — PROGRESS NOTES
Progress Note Patient: Priya De. MRN: [id number]  SSN: [id number] YOB: 1955  Age: 66 y.o. Sex: male Subjective: Chief Complaint Patient presents with  Hypertension 3 month follow up HPI: he is a 66y.o. year old male who presents for follow up of chronic health conditions. Patient with hx of skull fracture with resultant depression, neurogenic pain and hearing loss. He has a hearing aids. He says he mood is stable with current dose of fluoxetine. Patient denies HA, CP,  dysuria, acute myalgias or arthralgias. He has restarted smoking. Hypertension: The patient reports he is taking medications as instructed, no medication side effects noted, no TIA's, no chest pain on exertion, notes new dyspnea on exertion, no swelling of ankles. BP Readings from Last 3 Encounters:  
04/30/19 144/90  
01/30/19 138/89  
01/23/19 153/88 Patient advised to log blood pressures at home 2-3 times weekly and bring to next visit. Call office as soon as possible if BP's over 140/90 on multiple occasions or with symptoms of dizziness, chest pain, shortness of breath, headache or ankle swelling. Our goal is to normalize the blood pressure to decrease the risks of strokes and heart attacks. The patient is in agreement with the plan. Current and past medical information: 
 
Current Medications after this visit[de-identified]    
Current Outpatient Medications Medication Sig  pravastatin (PRAVACHOL) 10 mg tablet TAKE ONE TABLET BY MOUTH NIGHTLY  metoprolol tartrate (LOPRESSOR) 25 mg tablet TAKE 1/2 TABLET BY MOUTH TWICE DAILY  FLUoxetine (PROZAC) 20 mg capsule TAKE ONE CAPSULE BY MOUTH EVERY DAY  carBAMazepine (TEGRETOL) 100 mg chewable tablet Take 1 Tab by mouth three (3) times daily.  loratadine (CLARITIN) 10 mg tablet Take 1 Tab by mouth daily.   
 albuterol (PROVENTIL HFA, VENTOLIN HFA, PROAIR HFA) 90 mcg/actuation inhaler Take 2 Puffs by inhalation every four (4) hours as needed for Shortness of Breath.  aspirin delayed-release 81 mg tablet Take  by mouth daily.  omeprazole (PRILOSEC) 40 mg capsule TAKE ONE CAPSULE BY MOUTH EVERY DAY  losartan (COZAAR) 100 mg tablet Take 1 Tab by mouth daily.  budesonide (PULMICORT FLEXHALER) 90 mcg/actuation aepb inhaler Take 1 Puff by inhalation two (2) times a day. No current facility-administered medications for this visit. Patient Active Problem List  
 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  
 HTN (hypertension) 2012  Tinnitus 2012 Past Medical History:  
Diagnosis Date  H/O seasonal allergies  Hypertension No Known Allergies No past surgical history on file. Social History Socioeconomic History  Marital status: SINGLE Spouse name: Not on file  Number of children: Not on file  Years of education: Not on file  Highest education level: Not on file Tobacco Use  Smoking status: Current Every Day Smoker Packs/day: 1.00 Types: Cigars Last attempt to quit: 10/6/2018 Years since quittin.5  Smokeless tobacco: Never Used Substance and Sexual Activity  Alcohol use: No  
 Drug use: No  
 
 
 
Objective:  
 
Review of Systems: 
Constitutional: Negative for fatigue or malaise Derm: Negative for rash or lesion HEENT: Negative for acute hearing or vision changes Cardiovascular: Negative for dizziness, chest pain or palpitations Respiratory: Negative for productive cough and wheezing Gastreintestinal: Negative for N/V/D/C Genital/urinary: Negative for dysuria or voiding dysfunction Muscoloskeletal: Negative for myalgias or arthralgias Neurological: see HPI, Negative for weakness or paresthesia Psychological: see HPI Vitals:  
 04/30/19 1436 04/30/19 1506 BP: (!) 161/102 144/90 Pulse: (!) 57 Resp: 20 Temp: 98.4 °F (36.9 °C) TempSrc: Oral   
SpO2: 98% Weight: 207 lb 12.8 oz (94.3 kg) Height: 5' 8\" (1.727 m) Body mass index is 31.6 kg/m². Physical Exam: 
Constitutional: well developed, well nourished, in no acute distress Skin: warm and dry, normal tone and turgor Head: normocephalic, atraumatic Eyes: sclera clear, EOMI Neck: normal range of motion CV: normal S1, S2, regular rate and rhythm Respiratory: CTAB with symmetrical effort Extremities: full range of motion Neurology: normal strength and sensation Psych: active, alert and oriented, affect appropriate Assessment and orders:  
 
Diagnoses and all orders for this visit: 1. Essential hypertension -     METABOLIC PANEL, COMPREHENSIVE 
-     TSH 3RD GENERATION 2. 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 Hyperlipidemia, unspecified hyperlipidemia type -     pravastatin (PRAVACHOL) 10 mg tablet; TAKE ONE TABLET BY MOUTH NIGHTLY 
-     LIPID PANEL 
-     METABOLIC PANEL, COMPREHENSIVE 4. Convulsions, unspecified convulsion type ([address] -     CARBAMAZEPINE 5. History of traumatic brain injury 6. Chronic rhinitis 7. Tobacco abuse Plan of care: 
Diagnoses were discussed in detail with patient. Strongly advised patient to stop all use of tobacco products. Medication risks/benefits/side effects discussed with patient. All of the patient's questions were addressed and answered to apparent satisfaction. The patient understands and agrees with our plan of care. The patient knows to call back if they have questions about the plan of care or if symptoms change. The patient received an After-Visit Summary which contains VS, diagnoses, orders, allergy and medication lists. Future Appointments Date Time Provider Vanessa Gutierrez 10/30/2019 10:00 AM MD Clem Alva Patient Care Team: 
Guillermina Onofre MD as PCP - [facility]) Perry Segura MD (Rheumatology) Kathie Tran MD (Otolaryngology) Gilmer Caal MD (Cardiology) Julissa Santos MD (Neurology) Follow-up and Dispositions · Return in about 6 months (around 10/30/2019), or if symptoms worsen or fail to improve. Future Appointments Date Time Provider Vanessa Gutierrez 10/30/2019 10:00 AM MD Clem Alva Signed By: Shannon Torres MD   
 April 30, 2019

## 2019-05-04 LAB
ALBUMIN SERPL-MCNC: 4.2 G/DL (ref 3.6–4.8)
ALBUMIN/GLOB SERPL: 2 {RATIO} (ref 1.2–2.2)
ALP SERPL-CCNC: 112 IU/L (ref 39–117)
ALT SERPL-CCNC: 16 IU/L (ref 0–44)
AMYLASE SERPL-CCNC: 31 U/L (ref 31–124)
AST SERPL-CCNC: 14 IU/L (ref 0–40)
BILIRUB SERPL-MCNC: 0.4 MG/DL (ref 0–1.2)
BUN SERPL-MCNC: 15 MG/DL (ref 8–27)
BUN/CREAT SERPL: 16 (ref 10–24)
CALCIUM SERPL-MCNC: 8.8 MG/DL (ref 8.6–10.2)
CARBAMAZEPINE SERPL-MCNC: 6.4 UG/ML (ref 4–12)
CHLORIDE SERPL-SCNC: 102 MMOL/L (ref 96–106)
CHOLEST SERPL-MCNC: 160 MG/DL (ref 100–199)
CO2 SERPL-SCNC: 26 MMOL/L (ref 20–29)
CREAT SERPL-MCNC: 0.93 MG/DL (ref 0.76–1.27)
ERYTHROCYTE [DISTWIDTH] IN BLOOD BY AUTOMATED COUNT: 13.3 % (ref 12.3–15.4)
GLOBULIN SER CALC-MCNC: 2.1 G/DL (ref 1.5–4.5)
GLUCOSE SERPL-MCNC: 91 MG/DL (ref 65–99)
H PYLORI IGA SER-ACNC: <9 UNITS (ref 0–8.9)
H PYLORI IGG SER IA-ACNC: <0.8 INDEX VALUE (ref 0–0.79)
H PYLORI IGM SER-ACNC: <9 UNITS (ref 0–8.9)
HCT VFR BLD AUTO: 44.4 % (ref 37.5–51)
HDLC SERPL-MCNC: 44 MG/DL
HGB BLD-MCNC: 14.5 G/DL (ref 13–17.7)
LDLC SERPL CALC-MCNC: 100 MG/DL (ref 0–99)
LIPASE SERPL-CCNC: 34 U/L (ref 13–78)
MCH RBC QN AUTO: 31.5 PG (ref 26.6–33)
MCHC RBC AUTO-ENTMCNC: 32.7 G/DL (ref 31.5–35.7)
MCV RBC AUTO: 97 FL (ref 79–97)
PLATELET # BLD AUTO: 211 X10E3/UL (ref 150–379)
POTASSIUM SERPL-SCNC: 5.4 MMOL/L (ref 3.5–5.2)
PROT SERPL-MCNC: 6.3 G/DL (ref 6–8.5)
RBC # BLD AUTO: 4.6 X10E6/UL (ref 4.14–5.8)
SODIUM SERPL-SCNC: 141 MMOL/L (ref 134–144)
TRIGL SERPL-MCNC: 80 MG/DL (ref 0–149)
TSH SERPL DL<=0.005 MIU/L-ACNC: 1.28 UIU/ML (ref 0.45–4.5)
VLDLC SERPL CALC-MCNC: 16 MG/DL (ref 5–40)
WBC # BLD AUTO: 4.3 X10E3/UL (ref 3.4–10.8)

## 2019-05-10 DIAGNOSIS — I10 ESSENTIAL HYPERTENSION WITH GOAL BLOOD PRESSURE LESS THAN 140/90: ICD-10-CM

## 2019-05-10 RX ORDER — LOSARTAN POTASSIUM 100 MG/1
100 TABLET ORAL DAILY
Qty: 30 TAB | Refills: 3 | Status: SHIPPED | OUTPATIENT
Start: 2019-05-10 | End: 2019-08-08 | Stop reason: SDUPTHER

## 2019-05-24 DIAGNOSIS — E78.5 HYPERLIPIDEMIA, UNSPECIFIED HYPERLIPIDEMIA TYPE: ICD-10-CM

## 2019-05-24 RX ORDER — PRAVASTATIN SODIUM 10 MG/1
TABLET ORAL
Qty: 30 TAB | Refills: 5 | Status: SHIPPED | OUTPATIENT
Start: 2019-05-24 | End: 2019-10-30 | Stop reason: SDUPTHER

## 2019-06-13 ENCOUNTER — OFFICE VISIT (OUTPATIENT)
Dept: FAMILY MEDICINE CLINIC | Age: 64
End: 2019-06-13

## 2019-06-13 VITALS
HEIGHT: 68 IN | OXYGEN SATURATION: 94 % | WEIGHT: 203.4 LBS | SYSTOLIC BLOOD PRESSURE: 145 MMHG | TEMPERATURE: 98.4 F | DIASTOLIC BLOOD PRESSURE: 90 MMHG | HEART RATE: 80 BPM | BODY MASS INDEX: 30.83 KG/M2 | RESPIRATION RATE: 20 BRPM
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
## 2019-06-13 DIAGNOSIS — M79.641 RIGHT HAND PAIN: ICD-10-CM

## 2019-06-13 DIAGNOSIS — S62.91XA CLOSED FRACTURE OF RIGHT HAND, INITIAL ENCOUNTER: Primary | ICD-10-CM

## 2019-06-13 NOTE — PROGRESS NOTES
1. Have you been to the ER, urgent care clinic since your last visit? Hospitalized since your last visit? No    2. Have you seen or consulted any other health care providers outside of the [address] [place] [address] since your last visit? Include any pap smears or colon screening. No  Reviewed record in preparation for visit and have necessary documentation  Pt did not bring medication to office visit for review  Information was given to pt on Advanced Directives, Living Will  Information was given on Shingles Vaccine  opportunity was given for questions  Goals that were addressed and/or need to be completed during or after this appointment include   .   Health Maintenance Due   Topic Date Due    FOBT Q 1 YEAR AGE 50-75  12/10/2015

## 2019-06-13 NOTE — PROGRESS NOTES
[facility]    Subjective:   Paddy Castro is a 66 y.o. male with history of HTN, Seizure disorder, Depression, TBI  CC: Right Hand Pain  History provided by patient and Records    HPI:  Patient reports that he was walking yesterday and remembers waking up after falling with severe right hand swelling and pain. Patient reports that he has episodes of dizziness and \"feeling hot\" when walking, particularly when going from sitting to standing. History of seizures but is compliant with medications and denies any apparent seizure symptoms involved with fall. Transport is a problem, but patient believes he can setup transport for follow up. States pain is currently bearable with OTC meds. PFSH:     Current Outpatient Medications on File Prior to Visit   Medication Sig Dispense Refill    pravastatin (PRAVACHOL) 10 mg tablet TAKE ONE TABLET BY MOUTH NIGHTLY 30 Tab 5    losartan (COZAAR) 100 mg tablet Take 1 Tab by mouth daily. 30 Tab 3    omeprazole (PRILOSEC) 40 mg capsule TAKE ONE CAPSULE BY MOUTH EVERY DAY 30 Cap 1    metoprolol tartrate (LOPRESSOR) 25 mg tablet TAKE 1/2 TABLET BY MOUTH TWICE DAILY 30 Tab 2    FLUoxetine (PROZAC) 20 mg capsule TAKE ONE CAPSULE BY MOUTH EVERY DAY 30 Cap 3    carBAMazepine (TEGRETOL) 100 mg chewable tablet Take 1 Tab by mouth three (3) times daily. 90 Tab 2    loratadine (CLARITIN) 10 mg tablet Take 1 Tab by mouth daily. 30 Tab 3    albuterol (PROVENTIL HFA, VENTOLIN HFA, PROAIR HFA) 90 mcg/actuation inhaler Take 2 Puffs by inhalation every four (4) hours as needed for Shortness of Breath. 1 Inhaler 0    budesonide (PULMICORT FLEXHALER) 90 mcg/actuation aepb inhaler Take 1 Puff by inhalation two (2) times a day. 1 Inhaler 3    aspirin delayed-release 81 mg tablet Take  by mouth daily. No current facility-administered medications on file prior to visit.         Patient Active Problem List   Diagnosis Code    Hand pain M79.643    Tobacco abuse Z72.0    HTN (hypertension) I10    Tinnitus H93.19    Chest pain, unspecified R07.9    Back pain M54.9    Chronic SI joint pain M53.3, G89.29    Hyperlipidemia E78.5    Hearing loss of both ears H91.93    Neurogenic pain M79.2    Skull fracture (HCC) S02. 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    Recurrent depression (HCC) F33.9       Social History     Socioeconomic History    Marital status: SINGLE     Spouse name: Not on file    Number of children: Not on file    Years of education: Not on file    Highest education level: Not on file   Occupational History    Not on file   Social Needs    Financial resource strain: Not on file    Food insecurity:     Worry: Not on file     Inability: Not on file    Transportation needs:     Medical: Not on file     Non-medical: Not on file   Tobacco Use    Smoking status: Current Every Day Smoker     Packs/day: 1.00     Types: Cigars     Last attempt to quit: 10/6/2018     Years since quittin.6    Smokeless tobacco: Never Used   Substance and Sexual Activity    Alcohol use: No    Drug use: No    Sexual activity: Not on file   Lifestyle    Physical activity:     Days per week: Not on file     Minutes per session: Not on file    Stress: Not on file   Relationships    Social connections:     Talks on phone: Not on file     Gets together: Not on file     Attends [employer] service: Not on file     Active member of club or organization: Not on file     Attends meetings of clubs or organizations: Not on file     Relationship status: Not on file    Intimate partner violence:     Fear of current or ex partner: Not on file     Emotionally abused: Not on file     Physically abused: Not on file     Forced sexual activity: Not on file   Other Topics Concern    Not on file   Social History Narrative    Not on file       Review of Systems   Cardiovascular: Negative for chest pain.    Gastrointestinal: Negative for nausea and vomiting. Musculoskeletal:        Hand pain   Neurological: Positive for loss of consciousness. Negative for dizziness, seizures and headaches. Objective:     Visit Vitals  /90 (BP 1 Location: Left arm, BP Patient Position: Standing)   Pulse 80   Temp 98.4 °F (36.9 °C)   Resp 20   Ht 5' 8\" (1.727 m)   Wt 203 lb 6.4 oz (92.3 kg)   SpO2 94%   BMI 30.93 kg/m²          Physical Exam   Constitutional: He is oriented to person, place, and time. He appears well-developed and well-nourished. Cardiovascular: Normal rate, regular rhythm, normal heart sounds and intact distal pulses. Exam reveals no gallop and no friction rub. No murmur heard. Orthostatics negative   Pulmonary/Chest: Effort normal and breath sounds normal.   Abdominal: Soft. Bowel sounds are normal.   Musculoskeletal:   Severe swelling of the right hand. Dopplerable pulses intact. Neurological: He is alert and oriented to person, place, and time. Skin:   Swelling of the right hand   Nursing note and vitals reviewed. Pertinent Labs/Studies:  XR Right hand: Fracture fourth and fifth metacarpals. Assessment and orders:       ICD-10-CM ICD-9-CM    1. Closed fracture of right hand, initial encounter S62. 91XA 815.00 REFERRAL TO ORTHOPEDIC SURGERY   2. Right hand pain M79.641 729.5 XR HAND RT MIN 3 V   3. Fall, initial encounter [address] [place] L786.7      Diagnoses and all orders for this visit:    1. Closed fracture of right hand, initial encounter: Patient with acute comminuted fractures of fourth and fifth metacarpals. Placed in ace wrap with gauze padding to prevent making fist and placed in sling to assist with elevation. Urgent Ortho referral for eventual casting. Pulses intact. Discussed ER precautions. Will also need to consider Dexa scan given fracture with GLF.  -     REFERRAL TO ORTHOPEDIC SURGERY    2. Right hand pain  -     XR HAND RT MIN 3 V    3.  Falls: Unclear etiology, multifactorial.  Negative for orthostatic hypotension. Though history of seizures, no apparent seizure symptoms with fall. Previous EKGs consistently with Sinus bradycardia though normal rate here. After evaluation of hand by ortho, consider cardiac monitoring given falls of unknown etiology. Follow-up and Dispositions    · Return in about 2 weeks (around 6/27/2019). I have discussed the diagnosis with the patient and the intended plan as seen in the above orders. Social history, medical history, and labs were reviewed. The patient has received an after-visit summary and questions were answered concerning future plans. I have discussed medication side effects and warnings with the patient as well.     Deidre Heck MD  Resident [facility]  06/13/19    Patient discussed and seen with Dr. Adelso Rice, Attending Physician

## 2019-06-13 NOTE — PROGRESS NOTES
I saw and evaluated the patient with the resident, performing the key elements of the exam and service. I discussed the findings, assessment and plan with the resident and agree with the resident's findings and plan as documented in the resident's note. Xray of R hand:Oblique somewhat comminuted fracture of  the a fourth and fifth metacarpal subacute to chronic. To Ortho ASAP. Needs hand elevation. Unfortunately he has a tendency to fall, Hx of seizures. Juvenal Baron M.D.

## 2019-06-18 ENCOUNTER — OFFICE VISIT (OUTPATIENT)
Dept: FAMILY MEDICINE CLINIC | Age: 64
End: 2019-06-18

## 2019-06-18 VITALS
DIASTOLIC BLOOD PRESSURE: 84 MMHG | OXYGEN SATURATION: 95 % | HEIGHT: 68 IN | BODY MASS INDEX: 30.77 KG/M2 | WEIGHT: 203 LBS | TEMPERATURE: 99.1 F | HEART RATE: 56 BPM | SYSTOLIC BLOOD PRESSURE: 138 MMHG | RESPIRATION RATE: 20 BRPM

## 2019-06-18 DIAGNOSIS — Z91.199 NON-COMPLIANCE: ICD-10-CM

## 2019-06-18 DIAGNOSIS — J31.0 CHRONIC RHINITIS: Primary | ICD-10-CM

## 2019-06-18 DIAGNOSIS — R05.9 COUGH: ICD-10-CM

## 2019-06-18 PROBLEM — J41.0 SIMPLE CHRONIC BRONCHITIS (HCC): Chronic | Status: ACTIVE | Noted: 2019-06-18

## 2019-06-18 RX ORDER — FLUTICASONE PROPIONATE 50 MCG
2 SPRAY, SUSPENSION (ML) NASAL DAILY
Qty: 1 BOTTLE | Refills: 2 | Status: SHIPPED | OUTPATIENT
Start: 2019-06-18 | End: 2019-10-30 | Stop reason: SDUPTHER

## 2019-06-18 RX ORDER — LORATADINE 10 MG/1
10 TABLET ORAL DAILY
Qty: 30 TAB | Refills: 3 | Status: SHIPPED | OUTPATIENT
Start: 2019-06-18 | End: 2019-10-30 | Stop reason: SDUPTHER

## 2019-06-18 NOTE — PATIENT INSTRUCTIONS
Seasonal Allergies: Care Instructions  Your Care Instructions  Allergies occur when your body's defense system (immune system) overreacts to certain substances. The immune system treats a harmless substance as if it were a harmful germ or virus. Many things can cause this to happen. Examples include pollens, medicine, food, dust, animal dander, and mold. Your allergies are seasonal if you have symptoms just at certain times of the year. In that case, you are probably allergic to pollens from certain trees, grasses, or weeds. Allergies can be mild or severe. Over-the-counter allergy medicine may help with some symptoms. Read and follow all instructions on the label. Managing your allergies is an important part of staying healthy. Your doctor may suggest that you have tests to help find the cause of your allergies. When you know what things trigger your symptoms, you can avoid them. This can prevent allergy symptoms and other health problems. In some cases, immunotherapy might help. For this treatment, you get shots or use pills that have a small amount of certain allergens in them. Your body \"gets used to\" the allergen, so you react less to it over time. This kind of treatment may help prevent or reduce some allergy symptoms. Follow-up care is a key part of your treatment and safety. Be sure to make and go to all appointments, and call your doctor if you are having problems. It's also a good idea to know your test results and keep a list of the medicines you take. How can you care for yourself at home? · Be safe with medicines. Take your medicines exactly as prescribed. Call your doctor if you think you are having a problem with your medicine. · During your allergy season, keep windows closed. If you need to use air-conditioning, change or clean all filters every month. Take a shower and change your clothes after you have been outside. · Stay inside when pollen counts are high. Vacuum once or twice a week. Use a vacuum  with a HEPA filter or a double-thickness filter. When should you call for help? Give an epinephrine shot if:    · You think you are having a severe allergic reaction.    After giving an epinephrine shot, call 911, even if you feel better.   Call 911 if:    · You have symptoms of a severe allergic reaction. These may include:  ? Sudden raised, red areas (hives) all over your body. ? Swelling of the throat, mouth, lips, or tongue. ? Trouble breathing. ? Passing out (losing consciousness). Or you may feel very lightheaded or suddenly feel weak, confused, or restless.     · You have been given an epinephrine shot, even if you feel better.    Call your doctor now or seek immediate medical care if:    · You have symptoms of an allergic reaction, such as:  ? A rash or hives (raised, red areas on the skin). ? Itching. ? Swelling. ? Belly pain, nausea, or vomiting.    Watch closely for changes in your health, and be sure to contact your doctor if:    · You do not get better as expected. Where can you learn more? Go to http://jonatan-donna.info/. Enter J912 in the search box to learn more about \"Seasonal Allergies: Care Instructions. \"  Current as of: June 27, 2018  Content Version: 11.9  © 8495-2164 [employer]. Care instructions adapted under license by [employer] (which disclaims liability or warranty for this information). If you have questions about a medical condition or this instruction, always ask your healthcare professional. [place] [address] any warranty or liability for your use of this information.

## 2019-06-18 NOTE — PROGRESS NOTES
[facility]     Outpatient Resident Progress Note    Encounter Date: 6/18/2019    Chief Complaint   Patient presents with    Cold Symptoms     chest congestion, nasal congestion       History of Present Illness    Patient is a 66 y.o. male, who presents to clinic for Cold Symptoms (chest congestion, nasal congestion)  Patient reports dry cough, nasal congestion, and suggestive fever last night. He have no other fevers this morning, but still have cough and congestion. He has a Hx of COPD for which he is currently not taking any medications or follow up. He was prescribed Albuterol inhaler and Pulmicort BID. Denies rash, fever, fatigue, dizziness, lightheadedness, headaches, changes in vision, CP, palpitations, SOB, abdominal pain or tenderness, nausea, vomiting, dysuria, hematuria, diarrhea, melena, hematochezia, leg swelling, or any other complains at this moment. Patient states that he does not use Albuterol since it interferes with his daily smoking of tobacco. He smokes 3 to 4 cigars for about 40+ years. Review of Systems    A complete ROS was reviewed and only pertinent items documented on HPI. Allergies - reviewed:   No Known Allergies    Medications - reviewed:   Current Outpatient Medications   Medication Sig    fluticasone propionate (FLONASE) 50 mcg/actuation nasal spray 2 Sprays by Both Nostrils route daily.  loratadine (CLARITIN) 10 mg tablet Take 1 Tab by mouth daily.  pravastatin (PRAVACHOL) 10 mg tablet TAKE ONE TABLET BY MOUTH NIGHTLY    losartan (COZAAR) 100 mg tablet Take 1 Tab by mouth daily.  omeprazole (PRILOSEC) 40 mg capsule TAKE ONE CAPSULE BY MOUTH EVERY DAY    metoprolol tartrate (LOPRESSOR) 25 mg tablet TAKE 1/2 TABLET BY MOUTH TWICE DAILY    FLUoxetine (PROZAC) 20 mg capsule TAKE ONE CAPSULE BY MOUTH EVERY DAY    carBAMazepine (TEGRETOL) 100 mg chewable tablet Take 1 Tab by mouth three (3) times daily.  aspirin delayed-release 81 mg tablet Take  by mouth daily.  albuterol (PROVENTIL HFA, VENTOLIN HFA, PROAIR HFA) 90 mcg/actuation inhaler Take 2 Puffs by inhalation every four (4) hours as needed for Shortness of Breath.  budesonide (PULMICORT FLEXHALER) 90 mcg/actuation aepb inhaler Take 1 Puff by inhalation two (2) times a day. No current facility-administered medications for this visit. Past Medical History - reviewed:  Past Medical History:   Diagnosis Date    H/O seasonal allergies     Hypertension        Family Medical History - reviewed:  Family History   Family history unknown: Yes       Objective  Visit Vitals  /84 (BP 1 Location: Left arm, BP Patient Position: Sitting)   Pulse (!) 56   Temp 99.1 °F (37.3 °C) (Oral)   Resp 20   Ht 5' 8\" (1.727 m)   Wt 203 lb (92.1 kg)   SpO2 95%   BMI 30.87 kg/m²     Body mass index is 30.87 kg/m². Nursing note and vitals reviewed. Physical Exam  Constitutional: Well-developed, well-nourished, and in no distress. Obese. HENT:   Head: Normocephalic and atraumatic. Right Ear: External ear normal. Left Ear: External ear normal.   Nose: Nasal mucosa with erythema and edema with clear mucus  Mouth/Throat: Oropharyngeal erythema with clear post nasal draiange. Eyes: Conjunctivae and EOM are normal. Pupils are equal, round, and reactive to light. Right eye exhibits no discharge. Left eye exhibits no discharge. No scleral icterus. Neck: Normal range of motion. Neck supple. No JVD present. No tracheal deviation present. No thyromegaly present. Lymphadenopathy: No cervical adenopathy. Cardiovascular: Normal rate, regular rhythm, normal heart sounds and intact distal pulses. Exam reveals no gallop and no friction rub. No murmur heard. Pulmonary/Chest: Effort normal. No respiratory distress. Rales on RL base, LL with decreased breath sounds, no tenderness. Neurological: Alert and oriented to person, place, and time. Normal reflexes.  No cranial nerve deficit. Gait normal. Coordination normal.   Skin: Skin is warm and dry. No rash noted. Not diaphoretic. No erythema. No pallor. Psychiatric: Mood, memory, affect and judgment normal.     Assessment / Plan   Mr. Mandy Chambers is a 66 y.o. male with the following medical condition(s):    1. Chronic rhinitis  Presentation most consistent with allergic rhinitis. - fluticasone propionate (FLONASE) 50 mcg/actuation nasal spray; 2 Sprays by Both Nostrils route daily. Dispense: 1 Bottle; Refill: 2  - loratadine (CLARITIN) 10 mg tablet; Take 1 Tab by mouth daily. Dispense: 30 Tab; Refill: 3    2. Cough  CXR with no acute abnormalities. - XR CHEST PA LAT; Future    3. Non-compliance  Advised patient about the importance of compliance with medication to keep his chronic medical conditions. Patient states to understand and have no questions. Patient was advised to return to clinic in case of worsening of symptoms or fail to improve. Life threatening signs and symptoms were discussed and patient advised to go to the nearest ED for prompt evaluation and care in case of onset of any of these. Follow-up and Dispositions    · Return if symptoms worsen or fail to improve. · I have discussed the diagnosis with the patient and the intended plan as seen in the above orders. The patient has received an after-visit summary and questions were answered concerning future plans. I have discussed medication side effects and warnings with the patient as well.       Patient/Plan discussed with Dr. Stephani Serna (Attending Physician)      Courtney Mckeon MD  PGY-3 Family Medicine Resident  Encounter Date: 6/18/2019

## 2019-07-25 DIAGNOSIS — F32.A DEPRESSION DUE TO HEAD INJURY: ICD-10-CM

## 2019-07-25 DIAGNOSIS — S09.90XA DEPRESSION DUE TO HEAD INJURY: ICD-10-CM

## 2019-07-25 RX ORDER — OMEPRAZOLE 40 MG/1
CAPSULE, DELAYED RELEASE ORAL
Qty: 30 CAP | Refills: 2 | Status: SHIPPED | OUTPATIENT
Start: 2019-07-25 | End: 2019-10-30 | Stop reason: SDUPTHER

## 2019-07-25 RX ORDER — FLUOXETINE HYDROCHLORIDE 20 MG/1
CAPSULE ORAL
Qty: 30 CAP | Refills: 2 | Status: SHIPPED | OUTPATIENT
Start: 2019-07-25 | End: 2019-10-30 | Stop reason: SDUPTHER

## 2019-10-30 ENCOUNTER — OFFICE VISIT (OUTPATIENT)
Dept: FAMILY MEDICINE CLINIC | Age: 64
End: 2019-10-30

## 2019-10-30 VITALS
RESPIRATION RATE: 20 BRPM | TEMPERATURE: 98.3 F | BODY MASS INDEX: 30.62 KG/M2 | OXYGEN SATURATION: 99 % | WEIGHT: 202 LBS | SYSTOLIC BLOOD PRESSURE: 136 MMHG | DIASTOLIC BLOOD PRESSURE: 84 MMHG | HEART RATE: 59 BPM | HEIGHT: 68 IN

## 2019-10-30 DIAGNOSIS — Z23 ENCOUNTER FOR IMMUNIZATION: ICD-10-CM

## 2019-10-30 DIAGNOSIS — S09.90XA DEPRESSION DUE TO HEAD INJURY: ICD-10-CM

## 2019-10-30 DIAGNOSIS — R56.9 CONVULSIONS, UNSPECIFIED CONVULSION TYPE (HCC): ICD-10-CM

## 2019-10-30 DIAGNOSIS — E78.5 HYPERLIPIDEMIA, UNSPECIFIED HYPERLIPIDEMIA TYPE: ICD-10-CM

## 2019-10-30 DIAGNOSIS — Z12.11 SCREEN FOR COLON CANCER: ICD-10-CM

## 2019-10-30 DIAGNOSIS — K21.9 GASTROESOPHAGEAL REFLUX DISEASE WITHOUT ESOPHAGITIS: ICD-10-CM

## 2019-10-30 DIAGNOSIS — J31.0 CHRONIC RHINITIS: ICD-10-CM

## 2019-10-30 DIAGNOSIS — Z13.39 SCREENING FOR ALCOHOLISM: ICD-10-CM

## 2019-10-30 DIAGNOSIS — Z72.0 TOBACCO ABUSE: ICD-10-CM

## 2019-10-30 DIAGNOSIS — I10 ESSENTIAL HYPERTENSION: Primary | ICD-10-CM

## 2019-10-30 DIAGNOSIS — Z13.31 SCREENING FOR DEPRESSION: ICD-10-CM

## 2019-10-30 DIAGNOSIS — F32.A DEPRESSION DUE TO HEAD INJURY: ICD-10-CM

## 2019-10-30 DIAGNOSIS — Z00.00 MEDICARE ANNUAL WELLNESS VISIT, SUBSEQUENT: ICD-10-CM

## 2019-10-30 RX ORDER — METOPROLOL TARTRATE 25 MG/1
12.5 TABLET, FILM COATED ORAL DAILY
Qty: 30 TAB | Refills: 3 | Status: SHIPPED | OUTPATIENT
Start: 2019-10-30 | End: 2020-01-17 | Stop reason: SDUPTHER

## 2019-10-30 RX ORDER — LORATADINE 10 MG/1
10 TABLET ORAL DAILY
Qty: 30 TAB | Refills: 3 | Status: SHIPPED | OUTPATIENT
Start: 2019-10-30 | End: 2020-10-23 | Stop reason: SDUPTHER

## 2019-10-30 RX ORDER — OMEPRAZOLE 40 MG/1
CAPSULE, DELAYED RELEASE ORAL
Qty: 30 CAP | Refills: 2 | Status: SHIPPED | OUTPATIENT
Start: 2019-10-30 | End: 2020-01-02

## 2019-10-30 RX ORDER — LOSARTAN POTASSIUM 100 MG/1
TABLET ORAL
Qty: 30 TAB | Refills: 3 | Status: SHIPPED | OUTPATIENT
Start: 2019-10-30 | End: 2020-01-22 | Stop reason: SDUPTHER

## 2019-10-30 RX ORDER — PRAVASTATIN SODIUM 10 MG/1
TABLET ORAL
Qty: 30 TAB | Refills: 3 | Status: SHIPPED | OUTPATIENT
Start: 2019-10-30 | End: 2020-05-06

## 2019-10-30 RX ORDER — FLUTICASONE PROPIONATE 50 MCG
2 SPRAY, SUSPENSION (ML) NASAL DAILY
Qty: 1 BOTTLE | Refills: 3 | Status: SHIPPED | OUTPATIENT
Start: 2019-10-30 | End: 2020-10-23 | Stop reason: SDUPTHER

## 2019-10-30 RX ORDER — FLUOXETINE HYDROCHLORIDE 20 MG/1
CAPSULE ORAL
Qty: 30 CAP | Refills: 3 | Status: SHIPPED | OUTPATIENT
Start: 2019-10-30 | End: 2020-02-05 | Stop reason: SDUPTHER

## 2019-10-30 RX ORDER — CARBAMAZEPINE 100 MG/1
TABLET, CHEWABLE ORAL
Qty: 90 TAB | Refills: 3 | Status: SHIPPED | OUTPATIENT
Start: 2019-10-30 | End: 2020-02-13

## 2019-10-30 NOTE — PROGRESS NOTES
Progress Note    Patient: Alee Kim MRN: [id number]  SSN: [id number]    YOB: 1955  Age: 60 y.o. Sex: male        Subjective:     Chief Complaint   Patient presents with    Hypertension    Immunization/Injection    Annual Wellness Visit    Anxiety       HPI: he is a 60y.o. year old male who presents for follow up of chronic health conditions. Patient with hx of skull fracture with resultant depression, neurogenic pain and hearing loss. He has hearing aids. He says he mood is stable with current dose of fluoxetine. Patient denies HA, CP,  dysuria, acute myalgias or arthralgias. He continues to smoke. Hypertension:  The patient reports he is taking medications as instructed, no medication side effects noted, no TIA's, no chest pain on exertion, notes new dyspnea on exertion, no swelling of ankles. BP Readings from Last 3 Encounters:   10/30/19 136/84   06/18/19 138/84   06/13/19 145/90     Patient advised to log blood pressures at home 2-3 times weekly and bring to next visit. Call office as soon as possible if BP's over 140/90 on multiple occasions or with symptoms of dizziness, chest pain, shortness of breath, headache or ankle swelling. Our goal is to normalize the blood pressure to decrease the risks of strokes and heart attacks. The patient is in agreement with the plan. Current and past medical information:    Current Medications after this visit[de-identified]     Current Outpatient Medications   Medication Sig    metoprolol tartrate (LOPRESSOR) 25 mg tablet TAKE 1/2 TABLET BY MOUTH TWICE DAILY    losartan (COZAAR) 100 mg tablet TAKE ONE TABLET BY MOUTH EVERY DAY    omeprazole (PRILOSEC) 40 mg capsule TAKE ONE CAPSULE BY MOUTH EVERY DAY    FLUoxetine (PROZAC) 20 mg capsule TAKE ONE CAPSULE BY MOUTH EVERY DAY    fluticasone propionate (FLONASE) 50 mcg/actuation nasal spray 2 Sprays by Both Nostrils route daily.  loratadine (CLARITIN) 10 mg tablet Take 1 Tab by mouth daily.  pravastatin (PRAVACHOL) 10 mg tablet TAKE ONE TABLET BY MOUTH NIGHTLY    omeprazole (PRILOSEC) 40 mg capsule TAKE ONE CAPSULE BY MOUTH EVERY DAY    carBAMazepine (TEGRETOL) 100 mg chewable tablet Take 1 Tab by mouth three (3) times daily.  albuterol (PROVENTIL HFA, VENTOLIN HFA, PROAIR HFA) 90 mcg/actuation inhaler Take 2 Puffs by inhalation every four (4) hours as needed for Shortness of Breath.  budesonide (PULMICORT FLEXHALER) 90 mcg/actuation aepb inhaler Take 1 Puff by inhalation two (2) times a day.  aspirin delayed-release 81 mg tablet Take  by mouth daily. No current facility-administered medications for this visit. Patient Active Problem List    Diagnosis Date Noted    Non-compliance 2019    Simple chronic bronchitis ([address] 2019    Chronic rhinitis 2019    Recurrent depression ([address] 2018    Skull fracture ([address] 2014    Depression due to head injury 2014    Allergic arthritis involving hand 2014    Neurogenic pain 2014    Hearing loss of both ears 2013    Hyperlipidemia 2013    Back pain 2012    Chronic SI joint pain 2012    Chest pain, unspecified 2012    Hand pain 2012    Tobacco abuse 2012    HTN (hypertension) 2012    Tinnitus 2012       Past Medical History:   Diagnosis Date    H/O seasonal allergies     Hypertension        No Known Allergies    History reviewed. No pertinent surgical history.     Social History     Socioeconomic History    Marital status: SINGLE     Spouse name: Not on file    Number of children: Not on file    Years of education: Not on file    Highest education level: Not on file   Tobacco Use    Smoking status: Current Every Day Smoker     Packs/day: 1.00     Types: Cigars     Last attempt to quit: 10/6/2018     Years since quittin.0    Smokeless tobacco: Never Used   Substance and Sexual Activity    Alcohol use: No    Drug use: No         Objective:     Review of Systems:  Constitutional: Negative for fatigue or malaise  Derm: Negative for rash or lesion  HEENT: Negative for acute hearing or vision changes  Cardiovascular: Negative for dizziness, chest pain or palpitations  Respiratory: Negative for productive cough and wheezing  Gastreintestinal: Negative for N/V/D/C  Genital/urinary: Negative for dysuria or voiding dysfunction  Muscoloskeletal: Negative for myalgias or arthralgias   Neurological: see HPI, Negative for weakness or paresthesia  Psychological: see HPI      Vitals:    10/30/19 0944   BP: 136/84   Pulse: (!) 59   Resp: 20   Temp: 98.3 °F (36.8 °C)   TempSrc: Oral   SpO2: 99%   Weight: 202 lb (91.6 kg)   Height: 5' 8\" (1.727 m)      Body mass index is 30.71 kg/m². Physical Exam:  Constitutional: well developed, well nourished, in no acute distress  Skin: warm and dry, normal tone and turgor  Head: normocephalic, atraumatic  Eyes: sclera clear, EOMI  Neck: normal range of motion  CV: normal S1, S2, regular rate and rhythm  Respiratory: course breath sounds with symmetrical effort  Extremities: full range of motion, antalgic gait  Neurology: normal strength and sensation  Psych: active, alert and oriented, affect appropriate       Assessment and orders:     Diagnoses and all orders for this visit:    1. Essential hypertension  -     metoprolol tartrate (LOPRESSOR) 25 mg tablet  -     losartan (COZAAR) 100 mg tablet    2. Hyperlipidemia, unspecified hyperlipidemia type  -     pravastatin (PRAVACHOL) 10 mg tablet; TAKE ONE TABLET BY MOUTH NIGHTLY    3. Convulsions, unspecified convulsion type (HCC)  -     carBAMazepine (TEGRETOL) 100 mg chewable tablet    4. Depression due to head injury  -     FLUoxetine (PROZAC) 20 mg capsule; TAKE ONE CAPSULE BY MOUTH EVERY DAY    5. Gastroesophageal reflux disease without esophagitis  -     omeprazole (PRILOSEC) 40 mg capsule; TAKE ONE CAPSULE BY MOUTH EVERY DAY    6.  Chronic rhinitis  - fluticasone propionate (FLONASE) 50 mcg/actuation nasal spray; 2 Sprays by Both Nostrils route daily. -     loratadine (CLARITIN) 10 mg tablet; Take 1 Tab by mouth daily. 7. Tobacco abuse    8. Screen for colon cancer  -     OCCULT BLOOD IMMUNOASSAY,DIAGNOSTIC    9. Encounter for immunization  -     INFLUENZA VIRUS VAC QUAD,SPLIT,PRESV FREE SYRINGE IM  -     ADMIN INFLUENZA VIRUS VAC        Plan of care:  Diagnoses were discussed in detail with patient. Strongly advised patient to stop all use of tobacco products. Medication risks/benefits/side effects discussed with patient. All of the patient's questions were addressed and answered to apparent satisfaction. The patient understands and agrees with our plan of care. The patient knows to call back if they have questions about the plan of care or if symptoms change. The patient received an After-Visit Summary which contains VS, diagnoses, orders, allergy and medication lists. No future appointments. Patient Care Team:  Dandre Bedolla MD as PCP - General (Family Practice)  Dandre Bedolla MD as PCP - [facility]  Yodit Beth MD (Rheumatology)  Johnny Mae MD (Otolaryngology)  Laureano Berrios MD (Cardiology)  Bhavin Douglas MD (Neurology)        No future appointments. Signed By: Lauren Guzman MD     October 30, 2019        This is the Subsequent Medicare Annual Wellness Exam, performed 12 months or more after the Initial AWV or the last Subsequent AWV    I have reviewed the patient's medical history in detail and updated the computerized patient record.      History     Patient Active Problem List   Diagnosis Code    Hand pain M79.643    Tobacco abuse Z72.0    HTN (hypertension) I10    Tinnitus H93.19    Chest pain, unspecified R07.9    Back pain M54.9    Chronic SI joint pain M53.3, G89.29    Hyperlipidemia E78.5    Hearing loss of both ears H91.93    Neurogenic pain M79.2    Skull fracture ([address] S02.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    Recurrent depression (HCC) F33.9    Non-compliance Z91.19    Simple chronic bronchitis (HCC) J41.0    Chronic rhinitis J31.0     Past Medical History:   Diagnosis Date    H/O seasonal allergies     Hypertension       History reviewed. No pertinent surgical history. Current Outpatient Medications   Medication Sig Dispense Refill    metoprolol tartrate (LOPRESSOR) 25 mg tablet Take 0.5 Tabs by mouth daily. 30 Tab 3    carBAMazepine (TEGRETOL) 100 mg chewable tablet CHEW & SWALLOW 1 TABLET BY MOUTH 3 TIMES A DAY 90 Tab 3    losartan (COZAAR) 100 mg tablet TAKE ONE TABLET BY MOUTH EVERY DAY 30 Tab 3    omeprazole (PRILOSEC) 40 mg capsule TAKE ONE CAPSULE BY MOUTH EVERY DAY 30 Cap 2    FLUoxetine (PROZAC) 20 mg capsule TAKE ONE CAPSULE BY MOUTH EVERY DAY 30 Cap 3    fluticasone propionate (FLONASE) 50 mcg/actuation nasal spray 2 Sprays by Both Nostrils route daily. 1 Bottle 3    loratadine (CLARITIN) 10 mg tablet Take 1 Tab by mouth daily. 30 Tab 3    pravastatin (PRAVACHOL) 10 mg tablet TAKE ONE TABLET BY MOUTH NIGHTLY 30 Tab 3    omeprazole (PRILOSEC) 40 mg capsule TAKE ONE CAPSULE BY MOUTH EVERY DAY 30 Cap 1    carBAMazepine (TEGRETOL) 100 mg chewable tablet Take 1 Tab by mouth three (3) times daily. 90 Tab 2    albuterol (PROVENTIL HFA, VENTOLIN HFA, PROAIR HFA) 90 mcg/actuation inhaler Take 2 Puffs by inhalation every four (4) hours as needed for Shortness of Breath. 1 Inhaler 0    budesonide (PULMICORT FLEXHALER) 90 mcg/actuation aepb inhaler Take 1 Puff by inhalation two (2) times a day. 1 Inhaler 3    aspirin delayed-release 81 mg tablet Take  by mouth daily.        No Known Allergies    Family History   Family history unknown: Yes     Social History     Tobacco Use    Smoking status: Current Every Day Smoker     Packs/day: 1.00     Types: Cigars     Last attempt to quit: 10/6/2018 Years since quittin.0    Smokeless tobacco: Never Used   Substance Use Topics    Alcohol use: No       Depression Risk Factor Screening:     3 most recent PHQ Screens 2019   Little interest or pleasure in doing things Not at all   Feeling down, depressed, irritable, or hopeless Not at all   Total Score PHQ 2 0       Alcohol Risk Factor Screening (MALE < 65): Do you average more than 2 drinks per night or 14 drinks a week: No    On any one occasion in the past three months have you have had more than 4 drinks containing alcohol:  No      Functional Ability and Level of Safety:   Hearing: The patient wears hearing aids. Activities of Daily Living: The home contains: no safety equipment. Patient does total self care    Ambulation: with no difficulty    Fall Risk:  Fall Risk Assessment, last 12 mths 2019   Able to walk? Yes   Fall in past 12 months? Yes   Fall with injury? Yes   Number of falls in past 12 months 2   Fall Risk Score 3       Abuse Screen:  Patient is not abused    Cognitive Screening   Has your family/caregiver stated any concerns about your memory: no  Patient says he is more forgetful    Patient Care Team   Patient Care Team:  Mony Navas MD as PCP - General (Family Practice)  Mony Navas [facility] PCP - [facility]  Wei Mcelroy MD (Rheumatology)  Ning Puente MD (Otolaryngology)  Scout Rocha MD (Cardiology)  Cheyanne Coleman MD (Neurology)    Assessment/Plan   Education and counseling provided:  Are appropriate based on today's review and evaluation  End-of-Life planning (with patient's consent)    Diagnoses and all orders for this visit:    1. Medicare annual wellness visit, subsequent    2. Screening for depression  -     DEPRESSION SCREEN ANNUAL    3.  Screening for alcoholism  -     MA ANNUAL ALCOHOL SCREEN 15 MIN

## 2019-10-30 NOTE — PATIENT INSTRUCTIONS
Medicare Wellness Visit, Male The best way to live healthy is to have a lifestyle where you eat a well-balanced diet, exercise regularly, limit alcohol use, and quit all forms of tobacco/nicotine, if applicable. Regular preventive services are another way to keep healthy. Preventive services (vaccines, screening tests, monitoring & exams) can help personalize your care plan, which helps you manage your own care. Screening tests can find health problems at the earliest stages, when they are easiest to treat. [facility] follows the current, evidence-based guidelines published by the [facility] ([facility]) when recommending preventive services for our patients. Because we follow these guidelines, sometimes recommendations change over time as research supports it. (For example, a prostate screening blood test is no longer routinely recommended for men with no symptoms). Of course, you and your doctor may decide to screen more often for some diseases, based on your risk and co-morbidities (chronic disease you are already diagnosed with). Preventive services for you include: - Medicare offers their members a free annual wellness visit, which is time for you and your primary care provider to discuss and plan for your preventive service needs. Take advantage of this benefit every year! 
-All adults over age 72 should receive the recommended pneumonia vaccines. Current USPSTF guidelines recommend a series of two vaccines for the best pneumonia protection.  
-All adults should have a flu vaccine yearly and tetanus vaccine every 10 years. 
-All adults age 48 and older should receive the shingles vaccines (series of two vaccines).       
-All adults age 38-68 who are overweight should have a diabetes screening test once every three years.  
-Other screening tests & preventive services for persons with diabetes include: an eye exam to screen for diabetic retinopathy, a kidney function test, a foot exam, and stricter control over your cholesterol.  
-Cardiovascular screening for adults with routine risk involves an electrocardiogram (ECG) at intervals determined by the provider.  
-Colorectal cancer screening should be done for adults age 54-65 with no increased risk factors for colorectal cancer. There are a number of acceptable methods of screening for this type of cancer. Each test has its own benefits and drawbacks. Discuss with your provider what is most appropriate for you during your annual wellness visit. The different tests include: colonoscopy (considered the best screening method), a fecal occult blood test, a fecal DNA test, and sigmoidoscopy. 
-All adults born between [facility] should be screened once for Hepatitis C. 
-An Abdominal Aortic Aneurysm (AAA) Screening is recommended for men age 73-68 who has ever smoked in their lifetime. Here is a list of your current Health Maintenance items (your personalized list of preventive services) with a due date: 
Health Maintenance Due Topic Date Due  Stool testing for trace blood  12/10/2015  Flu Vaccine  08/01/2019 [address] [place] [address] Annual Well Visit  10/18/2019

## 2019-10-30 NOTE — PROGRESS NOTES
1. Have you been to the ER, urgent care clinic since your last visit? Hospitalized since your last visit? No    2. Have you seen or consulted any other health care providers outside of the [address] [place] [address] since your last visit? Include any pap smears or colon screening.  No  Reviewed record in preparation for visit and have necessary documentation  Pt did not bring medication to office visit for review    Goals that were addressed and/or need to be completed during or after this appointment include   Health Maintenance Due   Topic Date Due    FOBT Q 1 YEAR AGE 50-75  12/10/2015    Influenza Age 5 to Adult  08/01/2019    [address] [place] [address]  10/18/2019

## 2019-10-31 LAB
ALBUMIN SERPL-MCNC: 4.4 G/DL (ref 3.6–4.8)
ALBUMIN/GLOB SERPL: 2.1 {RATIO} (ref 1.2–2.2)
ALP SERPL-CCNC: 121 IU/L (ref 39–117)
ALT SERPL-CCNC: 16 IU/L (ref 0–44)
AST SERPL-CCNC: 18 IU/L (ref 0–40)
BILIRUB SERPL-MCNC: 0.2 MG/DL (ref 0–1.2)
BUN SERPL-MCNC: 15 MG/DL (ref 8–27)
BUN/CREAT SERPL: 16 (ref 10–24)
CALCIUM SERPL-MCNC: 8.7 MG/DL (ref 8.6–10.2)
CARBAMAZEPINE SERPL-MCNC: 5.6 UG/ML (ref 4–12)
CHLORIDE SERPL-SCNC: 102 MMOL/L (ref 96–106)
CHOLEST SERPL-MCNC: 183 MG/DL (ref 100–199)
CO2 SERPL-SCNC: 22 MMOL/L (ref 20–29)
CREAT SERPL-MCNC: 0.94 MG/DL (ref 0.76–1.27)
GLOBULIN SER CALC-MCNC: 2.1 G/DL (ref 1.5–4.5)
GLUCOSE SERPL-MCNC: 95 MG/DL (ref 65–99)
HCT VFR BLD AUTO: 44.5 % (ref 37.5–51)
HDLC SERPL-MCNC: 46 MG/DL
HGB BLD-MCNC: 15.3 G/DL (ref 13–17.7)
LDLC SERPL CALC-MCNC: 110 MG/DL (ref 0–99)
POTASSIUM SERPL-SCNC: 4.9 MMOL/L (ref 3.5–5.2)
PROT SERPL-MCNC: 6.5 G/DL (ref 6–8.5)
SODIUM SERPL-SCNC: 142 MMOL/L (ref 134–144)
TRIGL SERPL-MCNC: 136 MG/DL (ref 0–149)
TSH SERPL DL<=0.005 MIU/L-ACNC: 1.45 UIU/ML (ref 0.45–4.5)
VLDLC SERPL CALC-MCNC: 27 MG/DL (ref 5–40)

## 2019-12-19 NOTE — PROGRESS NOTES
Please schedule patient for appointment:     Follow up (Depression)     Thanks!    Appointments past 12m or future 3m    Date Provider   Last Visit   5/2/2019 ANITRA Amaya MD   Next Visit   Visit date not found ANITRA Amaya MD            This is the Subsequent Medicare Annual Wellness Exam, performed 12 months or more after the Initial AWV or the last Subsequent AWV I have reviewed the patient's medical history in detail and updated the computerized patient record. History Past Medical History:  
Diagnosis Date  H/O seasonal allergies  Hypertension History reviewed. No pertinent surgical history. Current Outpatient Medications Medication Sig Dispense Refill  carBAMazepine (TEGRETOL) 100 mg chewable tablet CHEW & SWALLOW 1 TABLET BY MOUTH 3 TIMES A DAY 90 Tab 3  
 nicotine (NICODERM CQ) 21 mg/24 hr 1 Patch by TransDERmal route every twenty-four (24) hours for 30 days. 30 Patch 0  
 albuterol (PROVENTIL HFA, VENTOLIN HFA, PROAIR HFA) 90 mcg/actuation inhaler Take 2 Puffs by inhalation every four (4) hours as needed for Shortness of Breath. 1 Inhaler 0  
 losartan (COZAAR) 100 mg tablet TAKE ONE TABLET BY MOUTH DAILY 30 Tab 3  
 metoprolol tartrate (LOPRESSOR) 25 mg tablet TAKE 1/2 TABLET BY MOUTH TWICE DAILY 30 Tab 2  
 FLUoxetine (PROZAC) 20 mg capsule Take 1 Cap by mouth daily. TAKE ONE CAPSULE BY MOUTH EVERY DAY 30 Cap 3  pravastatin (PRAVACHOL) 10 mg tablet TAKE ONE TABLET BY MOUTH NIGHTLY 30 Tab 5  
 aspirin delayed-release 81 mg tablet Take  by mouth daily.  cetirizine (ZYRTEC) 10 mg tablet Take 1 Tab by mouth daily. 90 Tab 3 No Known Allergies Family History Family history unknown: Yes  
 
Social History Tobacco Use  Smoking status: Former Smoker Packs/day: 1.00 Types: Cigarettes Last attempt to quit: 10/6/2018 Years since quittin.0  Smokeless tobacco: Never Used Substance Use Topics  Alcohol use: No  
 
Patient Active Problem List  
Diagnosis Code  Hand pain M79.643  Tobacco abuse Z72.0  
 HTN (hypertension) I10  
 Tinnitus H93.19  Chest pain, unspecified R07.9  Back pain M54.9  Chronic SI joint pain M53.3, G89.29  
 Hyperlipidemia E78.5  Hearing loss of both ears H91.93  
 Neurogenic pain M79.2  Skull fracture ([address] S02. 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 Functional Ability and Level of Safety:  
Hearing Loss Hearing is good. Activities of Daily Living The home contains: no safety equipment. Patient does total self care Fall Risk Fall Risk Assessment, last 12 mths 10/3/2018 Able to walk? Yes Fall in past 12 months? Yes Fall with injury? Yes  
Number of falls in past 12 months 2 Fall Risk Score 3 Abuse Screen Patient is not abused Cognitive Screening Evaluation of Cognitive Function: 
Has your family/caregiver stated any concerns about your memory: no 
Concerned about forgetfulness. Declines SLUMS at this time. Patient Care Team  
Patient Care Team: 
Deanna Rothman MD as PCP - [facility]) Araceli Vigil MD (Rheumatology) Dina Talley MD (Otolaryngology) Lina Brower MD (Cardiology) Katlyn Holden MD (Neurology) Assessment/Plan Education and counseling provided: 
Are appropriate based on today's review and evaluation End-of-Life planning (with patient's consent) Diagnoses and all orders for this visit: 
 
Routine general medical examination at health care facility Screening for alcoholism -     AL ANNUAL ALCOHOL SCREEN 15 MIN Screening for depression 
-     Alejandro Ramos Health Maintenance Due Topic Date Due  Shingrix Vaccine Age 50> (1 of 2) 09/12/2007  FOBT Q 1 YEAR AGE 50-75  12/10/2015  MEDICARE YEARLY EXAM  09/19/2018

## 2019-12-31 DIAGNOSIS — K21.9 GASTROESOPHAGEAL REFLUX DISEASE WITHOUT ESOPHAGITIS: ICD-10-CM

## 2020-01-02 RX ORDER — OMEPRAZOLE 40 MG/1
CAPSULE, DELAYED RELEASE ORAL
Qty: 30 CAP | Refills: 1 | Status: SHIPPED | OUTPATIENT
Start: 2020-01-02 | End: 2020-06-04

## 2020-01-08 ENCOUNTER — OFFICE VISIT (OUTPATIENT)
Dept: FAMILY MEDICINE CLINIC | Age: 65
End: 2020-01-08

## 2020-01-08 VITALS
BODY MASS INDEX: 31.58 KG/M2 | WEIGHT: 208.4 LBS | RESPIRATION RATE: 20 BRPM | DIASTOLIC BLOOD PRESSURE: 95 MMHG | OXYGEN SATURATION: 98 % | TEMPERATURE: 97 F | HEART RATE: 72 BPM | SYSTOLIC BLOOD PRESSURE: 172 MMHG | HEIGHT: 68 IN

## 2020-01-08 DIAGNOSIS — J31.0 CHRONIC RHINITIS: Primary | ICD-10-CM

## 2020-01-08 DIAGNOSIS — R06.02 SHORTNESS OF BREATH: ICD-10-CM

## 2020-01-08 RX ORDER — DIPHENHYDRAMINE HCL 25 MG
25 TABLET ORAL
Qty: 30 TAB | Refills: 1 | Status: SHIPPED | OUTPATIENT
Start: 2020-01-08

## 2020-01-08 NOTE — PROGRESS NOTES
Chief Complaint   Patient presents with    Cough     with congestion, runny nose     Visit Vitals  BP (!) 176/100 (BP 1 Location: Right arm, BP Patient Position: Sitting)   Pulse 72   Temp 97 °F (36.1 °C) (Oral)   Resp 20   Ht 5' 8\" (1.727 m)   Wt 208 lb 6.4 oz (94.5 kg)   SpO2 98%   BMI 31.69 kg/m²     1. Have you been to the ER, urgent care clinic since your last visit? Hospitalized since your last visit? No    2. Have you seen or consulted any other health care providers outside of the [address] [place] [address] since your last visit? Include any pap smears or colon screening.  No    Reviewed record in preparation for visit and have necessary documentation  Pt did not bring medication to office visit for review  opportunity was given for questions  Goals that were addressed and/or need to be completed during or after this appointment include   Health Maintenance Due   Topic Date Due    FOBT Q 1 YEAR AGE 50-75  12/10/2015

## 2020-01-08 NOTE — PROGRESS NOTES
[facility]    History of Present Illness:   Mandy Sandoval is a 60 y.o. male with history of Bronchitis, environmental allergies, TBI   CC: Environmental allergies  History provided by patient and Records    HPI:  Patient presents with unclear period, though prolonged period of post nasal drip and cough. Unclear if getting worse or not. Patient reports that he is taking Loratidine and Flonase, but is not taking Pulmicort currently. Patient notes some frontal maxillary congestion without pain. Memory is difficult due to TBI. Does appear has been acting up over the last few days with weather changes. Health Maintenance  Health Maintenance Due   Topic Date Due    FOBT Q 1 YEAR AGE 50-75  12/10/2015       Past Medical, Family, and Social History:     Current Outpatient Medications on File Prior to Visit   Medication Sig Dispense Refill    omeprazole (PRILOSEC) 40 mg capsule TAKE ONE CAPSULE BY MOUTH EVERY DAY 30 Cap 1    metoprolol tartrate (LOPRESSOR) 25 mg tablet Take 0.5 Tabs by mouth daily. 30 Tab 3    losartan (COZAAR) 100 mg tablet TAKE ONE TABLET BY MOUTH EVERY DAY 30 Tab 3    FLUoxetine (PROZAC) 20 mg capsule TAKE ONE CAPSULE BY MOUTH EVERY DAY 30 Cap 3    fluticasone propionate (FLONASE) 50 mcg/actuation nasal spray 2 Sprays by Both Nostrils route daily. 1 Bottle 3    loratadine (CLARITIN) 10 mg tablet Take 1 Tab by mouth daily. 30 Tab 3    pravastatin (PRAVACHOL) 10 mg tablet TAKE ONE TABLET BY MOUTH NIGHTLY 30 Tab 3    carBAMazepine (TEGRETOL) 100 mg chewable tablet Take 1 Tab by mouth three (3) times daily. 90 Tab 2    aspirin delayed-release 81 mg tablet Take  by mouth daily.  carBAMazepine (TEGRETOL) 100 mg chewable tablet CHEW & SWALLOW 1 TABLET BY MOUTH 3 TIMES A DAY 90 Tab 3    albuterol (PROVENTIL HFA, VENTOLIN HFA, PROAIR HFA) 90 mcg/actuation inhaler Take 2 Puffs by inhalation every four (4) hours as needed for Shortness of Breath.  1 Inhaler 0    budesonide (PULMICORT FLEXHALER) 90 mcg/actuation aepb inhaler Take 1 Puff by inhalation two (2) times a day. 1 Inhaler 3     No current facility-administered medications on file prior to visit. Patient Active Problem List   Diagnosis Code    Hand pain M79.643    Tobacco abuse Z72.0    HTN (hypertension) I10    Tinnitus H93.19    Chest pain, unspecified R07.9    Back pain M54.9    Chronic SI joint pain M53.3, G89.29    Hyperlipidemia E78.5    Hearing loss of both ears H91.93    Neurogenic pain M79.2    Skull fracture (HCC) S02. 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) J41.0    Chronic rhinitis J31.0       Social History     Socioeconomic History    Marital status: SINGLE     Spouse name: Not on file    Number of children: Not on file    Years of education: Not on file    Highest education level: Not on file   Occupational History    Not on file   Social Needs    Financial resource strain: Not on file    Food insecurity:     Worry: Not on file     Inability: Not on file    Transportation needs:     Medical: Not on file     Non-medical: Not on file   Tobacco Use    Smoking status: Current Every Day Smoker     Packs/day: 1.00     Types: Cigars     Last attempt to quit: 10/6/2018     Years since quittin.2    Smokeless tobacco: Never Used   Substance and Sexual Activity    Alcohol use: No    Drug use: No    Sexual activity: Not on file   Lifestyle    Physical activity:     Days per week: Not on file     Minutes per session: Not on file    Stress: Not on file   Relationships    Social connections:     Talks on phone: Not on file     Gets together: Not on file     Attends [employer] service: Not on file     Active member of club or organization: Not on file     Attends meetings of clubs or organizations: Not on file Relationship status: Not on file    Intimate partner violence:     Fear of current or ex partner: Not on file     Emotionally abused: Not on file     Physically abused: Not on file     Forced sexual activity: Not on file   Other Topics Concern    Not on file   Social History Narrative    Not on file       Review of Systems   Review of Systems   Constitutional: Negative for chills, fever and malaise/fatigue. HENT: Positive for congestion. Respiratory: Positive for cough. Cardiovascular: Negative for chest pain and palpitations. Gastrointestinal: Negative for abdominal pain, nausea and vomiting. Objective:     Visit Vitals  BP (!) 176/100 (BP 1 Location: Right arm, BP Patient Position: Sitting)   Pulse 72   Temp 97 °F (36.1 °C) (Oral)   Resp 20   Ht 5' 8\" (1.727 m)   Wt 208 lb 6.4 oz (94.5 kg)   SpO2 98%   BMI 31.69 kg/m²        Physical Exam  Vitals signs and nursing note reviewed. Constitutional:       Appearance: Normal appearance. HENT:      Head: Normocephalic and atraumatic. Right Ear: Tympanic membrane and ear canal normal.      Left Ear: Tympanic membrane and ear canal normal.      Nose: Congestion and rhinorrhea present. Mouth/Throat:      Mouth: Mucous membranes are moist.      Pharynx: Posterior oropharyngeal erythema present. No oropharyngeal exudate. Neck:      Musculoskeletal: Normal range of motion and neck supple. Cardiovascular:      Rate and Rhythm: Normal rate and regular rhythm. Pulses: Normal pulses. Heart sounds: Normal heart sounds. No murmur. No friction rub. No gallop. Pulmonary:      Effort: Pulmonary effort is normal.      Breath sounds: Normal breath sounds. Abdominal:      General: Abdomen is flat. Bowel sounds are normal.      Palpations: Abdomen is soft. Lymphadenopathy:      Cervical: No cervical adenopathy. Neurological:      Mental Status: He is alert.          Pertinent Labs/Studies:      Assessment and orders:       ICD-10-CM ICD-9-CM    1. Chronic rhinitis J31.0 472.0 budesonide (PULMICORT FLEXHALER) 90 mcg/actuation aepb inhaler      diphenhydrAMINE (BENADRYL ALLERGY) 25 mg tablet   2. Shortness of breath R06.02 786.05 budesonide (PULMICORT FLEXHALER) 90 mcg/actuation aepb inhaler     Diagnoses and all orders for this visit:    1. Chronic rhinitis: Patient recommended to restart Pulmicort and to start Bendryl to help dry up secretions. Likely weather related rhinitis. -     budesonide (PULMICORT FLEXHALER) 90 mcg/actuation aepb inhaler; Take 1 Puff by inhalation two (2) times a day. -     diphenhydrAMINE (BENADRYL ALLERGY) 25 mg tablet; Take 1 Tab by mouth every six (6) hours as needed (Allergies). 2. Shortness of breath  -     budesonide (PULMICORT FLEXHALER) 90 mcg/actuation aepb inhaler; Take 1 Puff by inhalation two (2) times a day. Follow-up and Dispositions    · Return if symptoms worsen or fail to improve. I have discussed the diagnosis with the patient and the intended plan as seen in the above orders. Social history, medical history, and labs were reviewed. The patient has received an after-visit summary and questions were answered concerning future plans. I have discussed medication side effects and warnings with the patient as well.     MD ANIA Zendejas & ABEL FITCH [facility]  01/08/20

## 2020-01-17 DIAGNOSIS — I10 ESSENTIAL HYPERTENSION: ICD-10-CM

## 2020-01-17 RX ORDER — METOPROLOL TARTRATE 25 MG/1
12.5 TABLET, FILM COATED ORAL DAILY
Qty: 30 TAB | Refills: 3 | Status: SHIPPED | OUTPATIENT
Start: 2020-01-17 | End: 2020-01-22 | Stop reason: SDUPTHER

## 2020-01-17 NOTE — TELEPHONE ENCOUNTER
Pt walked into office. He has ran out of his metoprolol medication and unable to get a refill for another 30 days. He use to take 1/2 tab 2x daily 12.5 mg but the Rx was changed to 1/2 tab 1x daily 25 mg. Pt was unaware and now he is unable to get a refill for another 30 days. Pt would like to know if a new Rx could be sent to pharmacy so he can get his medication.

## 2020-01-17 NOTE — TELEPHONE ENCOUNTER
Requested Prescriptions     Signed Prescriptions Disp Refills    metoprolol tartrate (LOPRESSOR) 25 mg tablet 30 Tab 3     Sig: Take 0.5 Tabs by mouth daily. Authorizing Provider: Alessandra Gomez     Ordering User: Vladimir perdomo per Dr. Fransisco Walker.

## 2020-01-22 ENCOUNTER — OFFICE VISIT (OUTPATIENT)
Dept: FAMILY MEDICINE CLINIC | Age: 65
End: 2020-01-22

## 2020-01-22 VITALS
BODY MASS INDEX: 31.77 KG/M2 | TEMPERATURE: 97.7 F | RESPIRATION RATE: 18 BRPM | HEIGHT: 68 IN | SYSTOLIC BLOOD PRESSURE: 166 MMHG | WEIGHT: 209.6 LBS | DIASTOLIC BLOOD PRESSURE: 105 MMHG | OXYGEN SATURATION: 97 % | HEART RATE: 72 BPM

## 2020-01-22 DIAGNOSIS — I10 ESSENTIAL HYPERTENSION: Primary | ICD-10-CM

## 2020-01-22 RX ORDER — METOPROLOL TARTRATE 25 MG/1
12.5 TABLET, FILM COATED ORAL DAILY
Qty: 30 TAB | Refills: 3 | Status: SHIPPED | OUTPATIENT
Start: 2020-01-22 | End: 2021-02-11

## 2020-01-22 RX ORDER — HYDROCHLOROTHIAZIDE 12.5 MG/1
12.5 TABLET ORAL DAILY
Qty: 30 TAB | Refills: 0 | Status: SHIPPED | OUTPATIENT
Start: 2020-01-22 | End: 2021-03-29

## 2020-01-22 RX ORDER — LOSARTAN POTASSIUM 100 MG/1
TABLET ORAL
Qty: 30 TAB | Refills: 3 | Status: SHIPPED | OUTPATIENT
Start: 2020-01-22 | End: 2020-07-16

## 2020-01-22 NOTE — PROGRESS NOTES
Chief Complaint   Patient presents with    Blood Pressure Check    Medication Evaluation     Visit Vitals  BP (!) 160/99 (BP 1 Location: Left arm, BP Patient Position: Sitting)   Pulse 72   Temp 97.7 °F (36.5 °C) (Oral)   Resp 18   Ht 5' 8\" (1.727 m)   Wt 209 lb 9.6 oz (95.1 kg)   SpO2 97%   BMI 31.87 kg/m²     1. Have you been to the ER, urgent care clinic since your last visit? Hospitalized since your last visit? No    2. Have you seen or consulted any other health care providers outside of the [address] [place] [address] since your last visit? Include any pap smears or colon screening.  No    Reviewed record in preparation for visit and have necessary documentation  Pt did not bring medication to office visit for review  opportunity was given for questions  Goals that were addressed and/or need to be completed during or after this appointment include   Health Maintenance Due   Topic Date Due    FOBT Q 1 YEAR AGE 50-75  12/10/2015

## 2020-01-22 NOTE — PROGRESS NOTES
[facility]    History of Present Illness:   Efren Mehta is a 60 y.o. male with history of HTN, TBI, chronic bronchitis  CC: Hypertension  History provided by patient and Records    HPI:  Hypertension Follow up:  Currently Taking Losartan 100 mg daily, Metoprolol 12.5 mg daily. Patient has been out metop for 4 days. The patient reports:  taking medications as instructed, no medication side effects noted, patient does not perform home BP monitoring, no TIA's, no chest pain on exertion, no dyspnea on exertion, no swelling of ankles. BP Readings from Last 3 Encounters:   01/22/20 (!) 160/99   01/08/20 (!) 172/95   10/30/19 136/84     Patient mentioned issues with pain in the legs. Had been told he has DDD and spinal stenosis as well. Asked about Gabapentin. Patient also asking about old TBI. Health Maintenance  Health Maintenance Due   Topic Date Due    FOBT Q 1 YEAR AGE 50-75  12/10/2015       Past Medical, Family, and Social History:     Current Outpatient Medications on File Prior to Visit   Medication Sig Dispense Refill    metoprolol tartrate (LOPRESSOR) 25 mg tablet Take 0.5 Tabs by mouth daily. 30 Tab 3    budesonide (PULMICORT FLEXHALER) 90 mcg/actuation aepb inhaler Take 1 Puff by inhalation two (2) times a day. 1 Inhaler 3    diphenhydrAMINE (BENADRYL ALLERGY) 25 mg tablet Take 1 Tab by mouth every six (6) hours as needed (Allergies). 30 Tab 1    omeprazole (PRILOSEC) 40 mg capsule TAKE ONE CAPSULE BY MOUTH EVERY DAY 30 Cap 1    carBAMazepine (TEGRETOL) 100 mg chewable tablet CHEW & SWALLOW 1 TABLET BY MOUTH 3 TIMES A DAY 90 Tab 3    losartan (COZAAR) 100 mg tablet TAKE ONE TABLET BY MOUTH EVERY DAY 30 Tab 3    FLUoxetine (PROZAC) 20 mg capsule TAKE ONE CAPSULE BY MOUTH EVERY DAY 30 Cap 3    fluticasone propionate (FLONASE) 50 mcg/actuation nasal spray 2 Sprays by Both Nostrils route daily.  1 Bottle 3    loratadine (CLARITIN) 10 mg tablet Take 1 Tab by mouth daily. 30 Tab 3    pravastatin (PRAVACHOL) 10 mg tablet TAKE ONE TABLET BY MOUTH NIGHTLY 30 Tab 3    carBAMazepine (TEGRETOL) 100 mg chewable tablet Take 1 Tab by mouth three (3) times daily. 90 Tab 2    albuterol (PROVENTIL HFA, VENTOLIN HFA, PROAIR HFA) 90 mcg/actuation inhaler Take 2 Puffs by inhalation every four (4) hours as needed for Shortness of Breath. 1 Inhaler 0    aspirin delayed-release 81 mg tablet Take  by mouth daily. No current facility-administered medications on file prior to visit. Patient Active Problem List   Diagnosis Code    Hand pain M79.643    Tobacco abuse Z72.0    HTN (hypertension) I10    Tinnitus H93.19    Chest pain, unspecified R07.9    Back pain M54.9    Chronic SI joint pain M53.3, G89.29    Hyperlipidemia E78.5    Hearing loss of both ears H91.93    Neurogenic pain M79.2    Skull fracture (HCC) S02. 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    Recurrent depression (HCC) F33.9    Non-compliance Z91.19    Simple chronic bronchitis (HCC) J41.0    Chronic rhinitis J31.0       Social History     Socioeconomic History    Marital status: SINGLE     Spouse name: Not on file    Number of children: Not on file    Years of education: Not on file    Highest education level: Not on file   Occupational History    Not on file   Social Needs    Financial resource strain: Not on file    Food insecurity:     Worry: Not on file     Inability: Not on file    Transportation needs:     Medical: Not on file     Non-medical: Not on file   Tobacco Use    Smoking status: Current Every Day Smoker     Packs/day: 1.00     Types: Cigars     Last attempt to quit: 10/6/2018     Years since quittin.2    Smokeless tobacco: Never Used   Substance and Sexual Activity    Alcohol use: No    Drug use: No    Sexual activity: Not on file   Lifestyle    Physical activity:     Days per week: Not on file     Minutes per session: Not on file    Stress: Not on file   Relationships    Social connections:     Talks on phone: Not on file     Gets together: Not on file     Attends Baptism service: Not on file     Active member of club or organization: Not on file     Attends meetings of clubs or organizations: Not on file     Relationship status: Not on file    Intimate partner violence:     Fear of current or ex partner: Not on file     Emotionally abused: Not on file     Physically abused: Not on file     Forced sexual activity: Not on file   Other Topics Concern    Not on file   Social History Narrative    Not on file       Review of Systems   Review of Systems   Constitutional: Negative for chills and fever. Respiratory: Negative for cough. Cardiovascular: Negative for chest pain and palpitations. Gastrointestinal: Negative for abdominal pain, nausea and vomiting. Neurological: Negative for tingling and headaches. Objective:     Visit Vitals  BP (!) 160/99 (BP 1 Location: Left arm, BP Patient Position: Sitting)   Pulse 72   Temp 97.7 °F (36.5 °C) (Oral)   Resp 18   Ht 5' 8\" (1.727 m)   Wt 209 lb 9.6 oz (95.1 kg)   SpO2 97%   BMI 31.87 kg/m²        Physical Exam  Vitals signs and nursing note reviewed. Constitutional:       Appearance: Normal appearance. HENT:      Head: Normocephalic and atraumatic. Neck:      Musculoskeletal: Normal range of motion and neck supple. Cardiovascular:      Rate and Rhythm: Normal rate and regular rhythm. Pulses: Normal pulses. Heart sounds: Normal heart sounds. No murmur. No friction rub. No gallop. Pulmonary:      Effort: Pulmonary effort is normal.      Breath sounds: Normal breath sounds. Abdominal:      General: Abdomen is flat. Bowel sounds are normal.      Palpations: Abdomen is soft. Neurological:      Mental Status: He is alert. Pertinent Labs/Studies:      Assessment and orders:       ICD-10-CM ICD-9-CM    1.  Essential hypertension I10 401.9 metoprolol tartrate (LOPRESSOR) 25 mg tablet      hydroCHLOROthiazide (HYDRODIURIL) 12.5 mg tablet      losartan (COZAAR) 100 mg tablet     Diagnoses and all orders for this visit:    1. Essential hypertension: Our goal is to normalize the blood pressure to decrease the risks of strokes and heart attacks. The patient is in agreement with the plan. -     metoprolol tartrate (LOPRESSOR) 25 mg tablet; Take 0.5 Tabs by mouth daily. -     hydroCHLOROthiazide (HYDRODIURIL) 12.5 mg tablet; Take 1 Tab by mouth daily. -     losartan (COZAAR) 100 mg tablet; TAKE ONE TABLET BY MOUTH EVERY DAY      Follow-up and Dispositions    · Return in about 2 weeks (around 2/5/2020) for Follow up with Dr. Berna Gaona. I have discussed the diagnosis with the patient and the intended plan as seen in the above orders. Social history, medical history, and labs were reviewed. The patient has received an after-visit summary and questions were answered concerning future plans. I have discussed medication side effects and warnings with the patient as well.     MD ANIA Penny & ABEL FITCH [facility]  01/22/20

## 2020-01-22 NOTE — PATIENT INSTRUCTIONS
Darcie [address] Affiliated with [address] [place] [address],  [address] [place], [facility] 
[phone] Log blood pressures at home while sitting, relaxed 3-5 times weekly and bring to next visit. Goal BP of 130/80 on average or lower. Call office as soon as possible if BP's over 140/90 or below 110/50 on multiple occasions and/or with symptoms of dizziness, chest pain, shortness of breath, headache or ankle swelling. Recheck Log and BP in office in [unfilled] Blood Pressure Record Patient Name:  ______________________ :  ______________________ Date/Time BP Reading Pulse

## 2020-02-05 ENCOUNTER — OFFICE VISIT (OUTPATIENT)
Dept: FAMILY MEDICINE CLINIC | Age: 65
End: 2020-02-05

## 2020-02-05 VITALS
OXYGEN SATURATION: 93 % | DIASTOLIC BLOOD PRESSURE: 83 MMHG | WEIGHT: 207 LBS | HEIGHT: 68 IN | RESPIRATION RATE: 20 BRPM | SYSTOLIC BLOOD PRESSURE: 122 MMHG | BODY MASS INDEX: 31.37 KG/M2 | TEMPERATURE: 98.1 F | HEART RATE: 69 BPM

## 2020-02-05 DIAGNOSIS — S09.90XA DEPRESSION DUE TO HEAD INJURY: ICD-10-CM

## 2020-02-05 DIAGNOSIS — F32.A DEPRESSION DUE TO HEAD INJURY: ICD-10-CM

## 2020-02-05 DIAGNOSIS — Z72.0 TOBACCO ABUSE: ICD-10-CM

## 2020-02-05 DIAGNOSIS — I10 ESSENTIAL HYPERTENSION: Primary | ICD-10-CM

## 2020-02-05 RX ORDER — FLUOXETINE HYDROCHLORIDE 40 MG/1
CAPSULE ORAL
Qty: 30 CAP | Refills: 3 | Status: SHIPPED | OUTPATIENT
Start: 2020-02-05 | End: 2020-05-21

## 2020-02-05 NOTE — PROGRESS NOTES
1. Have you been to the ER, urgent care clinic since your last visit? Hospitalized since your last visit? No    2. Have you seen or consulted any other health care providers outside of the [facility] since your last visit? Include any pap smears or colon screening.  No  Reviewed record in preparation for visit and have necessary documentation  Pt did not bring medication to office visit for review    Goals that were addressed and/or need to be completed during or after this appointment include   Health Maintenance Due   Topic Date Due    FOBT Q1Y Age 54-65  12/10/2015

## 2020-02-05 NOTE — PROGRESS NOTES
Progress Note    Patient: Johnny Dhaliwal. MRN: [id number]  SSN: [id number]    YOB: 1955  Age: 60 y.o. Sex: male        Subjective:     Chief Complaint   Patient presents with    Medication Evaluation    Anxiety       HPI: he is a 60y.o. year old male who presents for follow up of his chronic health conditions. Patient with hx of skull fracture with resultant depression, neurogenic pain and hearing loss. He has hearing aids. He says he mood is stable with current dose of fluoxetine. Patient denies HA, CP, dysuria, acute myalgias or arthralgias. BP well controlled today. He continues to smoke. Hypertension:  The patient reports he is taking medications as instructed, no medication side effects noted, no TIA's, no chest pain on exertion, notes new dyspnea on exertion, no swelling of ankles. BP Readings from Last 3 Encounters:   02/05/20 122/83   01/22/20 (!) 166/105   01/08/20 (!) 172/95     Patient advised to log blood pressures at home 2-3 times weekly and bring to next visit. Call office as soon as possible if BP's over 140/90 on multiple occasions or with symptoms of dizziness, chest pain, shortness of breath, headache or ankle swelling. Our goal is to normalize the blood pressure to decrease the risks of strokes and heart attacks. The patient is in agreement with the plan. Current and past medical information:    Current Medications after this visit[de-identified]     Current Outpatient Medications   Medication Sig    metoprolol tartrate (LOPRESSOR) 25 mg tablet Take 0.5 Tabs by mouth daily.  hydroCHLOROthiazide (HYDRODIURIL) 12.5 mg tablet Take 1 Tab by mouth daily.  losartan (COZAAR) 100 mg tablet TAKE ONE TABLET BY MOUTH EVERY DAY    budesonide (PULMICORT FLEXHALER) 90 mcg/actuation aepb inhaler Take 1 Puff by inhalation two (2) times a day.  diphenhydrAMINE (BENADRYL ALLERGY) 25 mg tablet Take 1 Tab by mouth every six (6) hours as needed (Allergies).     omeprazole (PRILOSEC) 40 mg capsule TAKE ONE CAPSULE BY MOUTH EVERY DAY    carBAMazepine (TEGRETOL) 100 mg chewable tablet CHEW & SWALLOW 1 TABLET BY MOUTH 3 TIMES A DAY    FLUoxetine (PROZAC) 20 mg capsule TAKE ONE CAPSULE BY MOUTH EVERY DAY    fluticasone propionate (FLONASE) 50 mcg/actuation nasal spray 2 Sprays by Both Nostrils route daily.  loratadine (CLARITIN) 10 mg tablet Take 1 Tab by mouth daily.  pravastatin (PRAVACHOL) 10 mg tablet TAKE ONE TABLET BY MOUTH NIGHTLY    albuterol (PROVENTIL HFA, VENTOLIN HFA, PROAIR HFA) 90 mcg/actuation inhaler Take 2 Puffs by inhalation every four (4) hours as needed for Shortness of Breath.  aspirin delayed-release 81 mg tablet Take  by mouth daily. No current facility-administered medications for this visit. Patient Active Problem List    Diagnosis Date Noted    Non-compliance 06/18/2019    Simple chronic bronchitis ([facility] 06/18/2019    Chronic rhinitis 06/18/2019    Recurrent depression ([facility] 02/26/2018    Skull fracture ([facility] 06/11/2014    Depression due to head injury 06/11/2014    Allergic arthritis involving hand 06/11/2014    Neurogenic pain 04/04/2014    Hearing loss of both ears 09/23/2013    Hyperlipidemia 02/19/2013    Back pain 07/19/2012    Chronic SI joint pain 07/19/2012    Chest pain, unspecified 04/17/2012    Hand pain 02/27/2012    Tobacco abuse 02/27/2012    HTN (hypertension) 02/27/2012    Tinnitus 02/27/2012       Past Medical History:   Diagnosis Date    Chronic bronchitis ([facility]     H/O seasonal allergies     Hypertension        No Known Allergies    History reviewed. No pertinent surgical history.     Social History     Socioeconomic History    Marital status: SINGLE     Spouse name: Not on file    Number of children: Not on file    Years of education: Not on file    Highest education level: Not on file   Tobacco Use    Smoking status: Current Every Day Smoker     Packs/day: 1.00     Types: Cigars     Last attempt to quit: 10/6/2018     Years since quittin.3    Smokeless tobacco: Never Used   Substance and Sexual Activity    Alcohol use: No    Drug use: No         Objective:     Review of Systems:  Constitutional: Negative for fatigue or malaise  Derm: Negative for rash or lesion  HEENT: Negative for acute hearing or vision changes  Cardiovascular: Negative for dizziness, chest pain or palpitations  Respiratory: Negative for productive cough and wheezing  Gastreintestinal: Negative for N/V/D/C  Genital/urinary: Negative for dysuria or voiding dysfunction  Muscoloskeletal: Negative for myalgias or arthralgias   Neurological: see HPI, Negative for weakness or paresthesia  Psychological: see HPI      Vitals:    20 1319   BP: 122/83   Pulse: 69   Resp: 20   Temp: 98.1 °F (36.7 °C)   TempSrc: Oral   SpO2: 93%   Weight: 207 lb (93.9 kg)   Height: 5' 8\" (1.727 m)      Body mass index is 31.47 kg/m². Physical Exam:  Constitutional: well developed, well nourished, in no acute distress  Skin: warm and dry, normal tone and turgor  Head: normocephalic, atraumatic  Eyes: sclera clear, EOMI  Neck: normal range of motion  CV: normal S1, S2, regular rate and rhythm  Respiratory: course breath sounds with symmetrical effort  Extremities: full range of motion, antalgic gait  Neurology: normal strength and sensation  Psych: active, alert and oriented, affect appropriate       Assessment and orders:     Diagnoses and all orders for this visit:    1. Depression due to head injury        Plan of care:  Diagnoses were discussed in detail with patient. Strongly advised patient to stop all use of tobacco products. Medication risks/benefits/side effects discussed with patient. All of the patient's questions were addressed and answered to apparent satisfaction. The patient understands and agrees with our plan of care. The patient knows to call back if they have questions about the plan of care or if symptoms change.   The patient received an After-Visit Summary which contains VS, diagnoses, orders, allergy and medication lists.       Future Appointments   Date Time Provider Department [place]   4/30/2020 10:20 AM Tana Rubalcava MD [facility] [place]       Patient Care Team:  Tana Rubalcava MD as PCP - General (Family Practice)  Tana Rubalcava MD as PCP - [facility]  Carlyle Mahoney MD (Rheumatology)  Tom Cuadra MD (Otolaryngology)  Sydney James MD (Cardiology)  Deena Minaya MD (Neurology)        Future Appointments   Date Time Provider [facility]   4/30/2020 10:20 AM MD [facility] Link       Signed By: Kaylynn Evans MD     February 5, 2020

## 2020-02-10 NOTE — PATIENT INSTRUCTIONS

## 2020-07-08 DIAGNOSIS — E78.5 HYPERLIPIDEMIA, UNSPECIFIED HYPERLIPIDEMIA TYPE: ICD-10-CM

## 2020-07-10 DIAGNOSIS — R56.9 CONVULSIONS, UNSPECIFIED CONVULSION TYPE (HCC): ICD-10-CM

## 2020-07-10 RX ORDER — PRAVASTATIN SODIUM 10 MG/1
TABLET ORAL
Qty: 30 TAB | Refills: 0 | Status: SHIPPED | OUTPATIENT
Start: 2020-07-10 | End: 2020-09-11

## 2020-07-10 RX ORDER — CARBAMAZEPINE 100 MG/1
100 TABLET, CHEWABLE ORAL 3 TIMES DAILY
Qty: 90 TAB | Refills: 0 | Status: SHIPPED | OUTPATIENT
Start: 2020-07-10 | End: 2020-09-11

## 2020-07-10 NOTE — TELEPHONE ENCOUNTER
Pt took bottle to Pharmacy this morning. I told him the protocol of 48 hours. Also I did not see the request. He needs refills on this medication he says because it only last him a month.

## 2020-07-13 ENCOUNTER — VIRTUAL VISIT (OUTPATIENT)
Dept: FAMILY MEDICINE CLINIC | Age: 65
End: 2020-07-13

## 2020-07-13 DIAGNOSIS — Z72.0 TOBACCO ABUSE: ICD-10-CM

## 2020-07-13 DIAGNOSIS — R56.9 CONVULSIONS, UNSPECIFIED CONVULSION TYPE (HCC): Primary | ICD-10-CM

## 2020-07-13 DIAGNOSIS — E78.5 HYPERLIPIDEMIA, UNSPECIFIED HYPERLIPIDEMIA TYPE: ICD-10-CM

## 2020-07-13 DIAGNOSIS — I10 ESSENTIAL HYPERTENSION: ICD-10-CM

## 2020-07-13 NOTE — PROGRESS NOTES
1. Have you been to the ER, urgent care clinic since your last visit? Hospitalized since your last visit? No    2. Have you seen or consulted any other health care providers outside of the [address] [place] [address] since your last visit? Include any pap smears or colon screening.  No  Reviewed record in preparation for visit and have necessary documentation  opportunity was given for questions  Goals that were addressed and/or need to be completed during or after this appointment include    Health Maintenance Due   Topic Date Due    FOBT Q1Y Age 54-65  12/10/2015

## 2020-07-13 NOTE — PROGRESS NOTES
Stoney Dunn is a 60 y.o. male evaluated via telephone on 20. Patient Identity confirmed by . Telephone encounter done in lieu of office visit due to extraordinary circumstances. A state of national and state emergency has been declared by the President and the [place] due to the [employer] pandemic. Pursuant to the emergency declaration under the [facility] [place] [address] [facility] authority and the [employer] and [employer] Act, this Virtual  Visit was conducted, with patient's consent, to reduce the patient's risk of exposure to COVID-19 and provide continuity of care for an established patient. Yani Congress LPN coordinated virtual visit    Consent:  Patient and/or health care decision maker is aware that he may receive a bill for this telephone encounter, depending on his insurance coverage, and has provided verbal consent to proceed: Yes    Physician Location: Office  Patient Location: Home    CC: follow up  Information gathered from patient and/or health care decision maker. HPI: Stoney Dunn is a 60 y.o. male who was evaluated by synchronous (real-time) audio technology from her home. Patient with hx of skull fracture with resultant depression, neurogenic pain and hearing loss. He has hearing aids. He says he mood is stable with current dose of fluoxetine. Patient denies HA, CP, dysuria, acute myalgias or arthralgias. He continues to smoke. Current Outpatient Medications:     pravastatin (PRAVACHOL) 10 mg tablet, TAKE ONE TABLET BY MOUTH NIGHTLY, Disp: 30 Tab, Rfl: 0    carBAMazepine (TEGretol) 100 mg chewable tablet, Take 1 Tab by mouth three (3) times daily. , Disp: 90 Tab, Rfl: 0    omeprazole (PRILOSEC) 40 mg capsule, TAKE ONE CAPSULE BY MOUTH EVERY DAY, Disp: 30 Cap, Rfl: 2    FLUoxetine (PROzac) 40 mg capsule, TAKE ONE CAPSULE BY MOUTH EVERY DAY, Disp: 30 Cap, Rfl: 2    metoprolol tartrate (LOPRESSOR) 25 mg tablet, Take 0.5 Tabs by mouth daily. , Disp: 30 Tab, Rfl: 3    hydroCHLOROthiazide (HYDRODIURIL) 12.5 mg tablet, Take 1 Tab by mouth daily. , Disp: 30 Tab, Rfl: 0    losartan (COZAAR) 100 mg tablet, TAKE ONE TABLET BY MOUTH EVERY DAY, Disp: 30 Tab, Rfl: 3    budesonide (PULMICORT FLEXHALER) 90 mcg/actuation aepb inhaler, Take 1 Puff by inhalation two (2) times a day., Disp: 1 Inhaler, Rfl: 3    diphenhydrAMINE (BENADRYL ALLERGY) 25 mg tablet, Take 1 Tab by mouth every six (6) hours as needed (Allergies). , Disp: 30 Tab, Rfl: 1    fluticasone propionate (FLONASE) 50 mcg/actuation nasal spray, 2 Sprays by Both Nostrils route daily. , Disp: 1 Bottle, Rfl: 3    loratadine (CLARITIN) 10 mg tablet, Take 1 Tab by mouth daily. , Disp: 30 Tab, Rfl: 3    albuterol (PROVENTIL HFA, VENTOLIN HFA, PROAIR HFA) 90 mcg/actuation inhaler, Take 2 Puffs by inhalation every four (4) hours as needed for Shortness of Breath., Disp: 1 Inhaler, Rfl: 0    aspirin delayed-release 81 mg tablet, Take  by mouth daily. , Disp: , Rfl:      No Known Allergies     Patient Active Problem List    Diagnosis Date Noted    Non-compliance 06/18/2019    [facility] 06/18/2019    Chronic rhinitis 06/18/2019    Recurrent depression ([facility] 02/26/2018    [facility] 06/11/2014    Depression due to head injury 06/11/2014    Allergic arthritis involving hand 06/11/2014    Neurogenic pain 04/04/2014    Hearing loss of both ears 09/23/2013    Hyperlipidemia 02/19/2013    Back pain 07/19/2012    Chronic SI joint pain 07/19/2012    Chest pain, unspecified 04/17/2012    Hand pain 02/27/2012    Tobacco abuse 02/27/2012    HTN (hypertension) 02/27/2012    Tinnitus 02/27/2012        Review of Systems:  Constitutional: Negative for fatigue, malaise  Resp: Negative for cough, wheezing or SOB  CV: Negative for chest pain, dizziness or palpitations  GI: Negative for nausea or abdominal pain  MS: Negative for acute myalgias or arthralgias   Neuro: Negative for HA, weakness or paresthesia  Psych: Negative for depression or anxiety       Assessment/Plan:  Details of this discussion including any medical advice provided: Patient advised as a precaution to self isolate at home, practice regular hand washing with soap and warm water and to wear a mask and utilize social distancing when necessary to be out in public places. ICD-10-CM ICD-9-CM    1. Convulsions, unspecified convulsion type ([facility]  R56.9 780.39 CARBAMAZEPINE   2. Hyperlipidemia, unspecified hyperlipidemia type  E78.5 272.4 LIPID PANEL      METABOLIC PANEL, COMPREHENSIVE   3. Essential hypertension  W96 398.5 METABOLIC PANEL, COMPREHENSIVE      CBC W/O DIFF      TSH 3RD GENERATION   4. Tobacco abuse  Z72.0 305.1      Continue current prescribed medications as written. No medication changes at this time. Strongly advised patient to stop all use of tobacco products. Symptomatic therapy suggested: call prn if symptoms persist or worsen. Total Time: 25 minutes was spent on phone discussing above problems and plan. Patient medical history, prior OV notes, vitals flow sheet, lab results, medications, and allergies were reviewed during this encounter. For phone encounters:  I affirm this is a patient initiated episode with an established Patient who has not had a related appointment within my department in the past 7 days or scheduled within the next 24 hours.     Note: not billable if this call serves to triage the patient into an appointment for the relevant concern    MD ANIA Constantino & ABEL FITCH [facility]  07/13/20

## 2020-07-14 ENCOUNTER — HOSPITAL ENCOUNTER (OUTPATIENT)
Dept: LAB | Age: 65
Discharge: HOME | End: 2020-07-14

## 2020-07-14 DIAGNOSIS — I10 ESSENTIAL HYPERTENSION: ICD-10-CM

## 2020-07-14 DIAGNOSIS — R56.9 CONVULSIONS, UNSPECIFIED CONVULSION TYPE (HCC): ICD-10-CM

## 2020-07-14 DIAGNOSIS — E78.5 HYPERLIPIDEMIA, UNSPECIFIED HYPERLIPIDEMIA TYPE: ICD-10-CM

## 2020-07-14 LAB
ALBUMIN SERPL-MCNC: 4.2 G/DL (ref 3.5–5)
ALBUMIN/GLOB SERPL: 1.4 {RATIO} (ref 1.1–2.2)
ALP SERPL-CCNC: 123 U/L (ref 45–117)
ALT SERPL-CCNC: 32 U/L (ref 12–78)
ANION GAP SERPL CALC-SCNC: 6 MMOL/L (ref 5–15)
AST SERPL-CCNC: 20 U/L (ref 15–37)
BILIRUB SERPL-MCNC: 0.4 MG/DL (ref 0.2–1)
BUN SERPL-MCNC: 13 MG/DL (ref 6–20)
BUN/CREAT SERPL: 13 (ref 12–20)
CALCIUM SERPL-MCNC: 8.5 MG/DL (ref 8.5–10.1)
CARBAMAZEPINE SERPL-MCNC: 6.8 UG/ML (ref 4–12)
CHLORIDE SERPL-SCNC: 104 MMOL/L (ref 97–108)
CHOLEST SERPL-MCNC: 219 MG/DL
CO2 SERPL-SCNC: 29 MMOL/L (ref 21–32)
CREAT SERPL-MCNC: 1 MG/DL (ref 0.7–1.3)
ERYTHROCYTE [DISTWIDTH] IN BLOOD BY AUTOMATED COUNT: 13.2 % (ref 11.5–14.5)
GLOBULIN SER CALC-MCNC: 2.9 G/DL (ref 2–4)
GLUCOSE SERPL-MCNC: 141 MG/DL (ref 65–100)
HCT VFR BLD AUTO: 49.9 % (ref 36.6–50.3)
HDLC SERPL-MCNC: 54 MG/DL
HDLC SERPL: 4.1 {RATIO} (ref 0–5)
HGB BLD-MCNC: 15.3 G/DL (ref 12.1–17)
LDLC SERPL CALC-MCNC: 121.2 MG/DL (ref 0–100)
LIPID PROFILE,FLP: ABNORMAL
MCH RBC QN AUTO: 31.9 PG (ref 26–34)
MCHC RBC AUTO-ENTMCNC: 30.7 G/DL (ref 30–36.5)
MCV RBC AUTO: 104.2 FL (ref 80–99)
NRBC # BLD: 0 K/UL (ref 0–0.01)
NRBC BLD-RTO: 0 PER 100 WBC
PLATELET # BLD AUTO: 222 K/UL (ref 150–400)
PMV BLD AUTO: 9.6 FL (ref 8.9–12.9)
POTASSIUM SERPL-SCNC: 5.2 MMOL/L (ref 3.5–5.1)
PROT SERPL-MCNC: 7.1 G/DL (ref 6.4–8.2)
RBC # BLD AUTO: 4.79 M/UL (ref 4.1–5.7)
SODIUM SERPL-SCNC: 139 MMOL/L (ref 136–145)
TRIGL SERPL-MCNC: 219 MG/DL (ref ?–150)
TSH SERPL DL<=0.05 MIU/L-ACNC: 1.99 UIU/ML (ref 0.36–3.74)
VLDLC SERPL CALC-MCNC: 43.8 MG/DL
WBC # BLD AUTO: 5.8 K/UL (ref 4.1–11.1)

## 2020-09-01 ENCOUNTER — VIRTUAL VISIT (OUTPATIENT)
Dept: FAMILY MEDICINE CLINIC | Age: 65
End: 2020-09-01
Payer: MEDICARE

## 2020-09-01 DIAGNOSIS — E78.5 HYPERLIPIDEMIA, UNSPECIFIED HYPERLIPIDEMIA TYPE: ICD-10-CM

## 2020-09-01 DIAGNOSIS — I10 ESSENTIAL HYPERTENSION: ICD-10-CM

## 2020-09-01 DIAGNOSIS — Z72.0 TOBACCO ABUSE: ICD-10-CM

## 2020-09-01 DIAGNOSIS — R56.9 CONVULSIONS, UNSPECIFIED CONVULSION TYPE (HCC): ICD-10-CM

## 2020-09-01 DIAGNOSIS — Z87.820 HISTORY OF TRAUMATIC BRAIN INJURY: ICD-10-CM
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
## 2020-09-01 PROCEDURE — 99443 PR PHYS/QHP TELEPHONE EVALUATION 21-30 MIN: CPT | Performed by: FAMILY MEDICINE

## 2020-09-01 NOTE — PROGRESS NOTES
1. Have you been to the ER, urgent care clinic since your last visit? Hospitalized since your last visit? No    2. Have you seen or consulted any other health care providers outside of the [facility] since your last visit? Include any pap smears or colon screening.  No      Health Maintenance Due   Topic Date Due    FOBT Q1Y Age 54-65  12/10/2015    Flu Vaccine (1) 09/01/2020

## 2020-10-17 DIAGNOSIS — I10 ESSENTIAL HYPERTENSION: ICD-10-CM

## 2020-10-21 RX ORDER — LOSARTAN POTASSIUM 100 MG/1
TABLET ORAL
Qty: 30 TAB | Refills: 0 | Status: SHIPPED | OUTPATIENT
Start: 2020-10-21 | End: 2020-12-20

## 2020-10-23 ENCOUNTER — VIRTUAL VISIT (OUTPATIENT)
Dept: FAMILY MEDICINE CLINIC | Age: 65
End: 2020-10-23
Payer: MEDICARE

## 2020-10-23 DIAGNOSIS — Z13.31 SCREENING FOR DEPRESSION: ICD-10-CM

## 2020-10-23 DIAGNOSIS — J31.0 CHRONIC RHINITIS: Primary | ICD-10-CM

## 2020-10-23 DIAGNOSIS — Z00.00 MEDICARE ANNUAL WELLNESS VISIT, SUBSEQUENT: ICD-10-CM

## 2020-10-23 DIAGNOSIS — K21.9 GASTROESOPHAGEAL REFLUX DISEASE WITHOUT ESOPHAGITIS: ICD-10-CM

## 2020-10-23 DIAGNOSIS — Z72.0 TOBACCO ABUSE: ICD-10-CM

## 2020-10-23 PROCEDURE — 99442 PR PHYS/QHP TELEPHONE EVALUATION 11-20 MIN: CPT | Performed by: FAMILY MEDICINE

## 2020-10-23 PROCEDURE — G0439 PPPS, SUBSEQ VISIT: HCPCS | Performed by: FAMILY MEDICINE

## 2020-10-23 RX ORDER — LORATADINE 10 MG/1
10 TABLET ORAL DAILY
Qty: 30 TAB | Refills: 3 | Status: SHIPPED | OUTPATIENT
Start: 2020-10-23 | End: 2021-03-29

## 2020-10-23 RX ORDER — FLUTICASONE PROPIONATE 50 MCG
2 SPRAY, SUSPENSION (ML) NASAL DAILY
Qty: 1 BOTTLE | Refills: 1 | Status: SHIPPED | OUTPATIENT
Start: 2020-10-23 | End: 2021-03-29

## 2020-10-23 RX ORDER — GUAIFENESIN 600 MG/1
600 TABLET, EXTENDED RELEASE ORAL 2 TIMES DAILY
Qty: 60 TAB | Refills: 1 | Status: SHIPPED | OUTPATIENT
Start: 2020-10-23 | End: 2021-03-29

## 2020-10-23 NOTE — PROGRESS NOTES
Chief Complaint   Patient presents with    Gas    Cough     Productive Cough -- Clear/Brown in color        1. Have you been to the ER, urgent care clinic since your last visit? Hospitalized since your last visit? No    2. Have you seen or consulted any other health care providers outside of the [address] [place] [address] since your last visit? Include any pap smears or colon screening.  No    Reviewed record in preparation for visit and have necessary documentation  Pt did not bring medication to office visit for review  Information was given to pt on Advanced Directives, Living Will  Information was given on Shingles Vaccine  Opportunity was given for questions  Goals that were addressed and/or need to be completed after this appointment include:     Health Maintenance Due   Topic Date Due    Colorectal Cancer Screening Combo  12/10/2015    Flu Vaccine (1) 09/01/2020    Medicare Yearly Exam  10/30/2020

## 2020-10-23 NOTE — PROGRESS NOTES
Bhavna Mathew is a 63 y.o. male evaluated via telephone on 10/23/20. Patient Identity confirmed by . Telephone encounter done in lieu of office visit due to extraordinary circumstances. A state of national and state emergency has been declared by the President and the [place] due to the [employer] pandemic. Pursuant to the emergency declaration under the [facility] authority and the [employer] Act, this Virtual  Visit was conducted, with patient's consent, to reduce the patient's risk of exposure to COVID-19 and provide continuity of care for an established patient. Ariella Banegas LPN coordinated virtual visit    Consent:  Patient and/or health care decision maker is aware that he/she may receive a bill for this telephone encounter, depending on his insurance coverage, and has provided verbal consent to proceed: Yes    Physician Location: Office  Patient Location: Home    CC: URI  Information gathered from patient and/or health care decision maker. HPI: Bhavna Mathew is a 63 y.o. male who was evaluated by synchronous (real-time) audio technology from his/her home. Patient complains of congestion, post nasal drip and cough described as productive of clear sputum for several weeks. no nausea and no vomiting. he has not had  myalgias, headache and fever. Symptoms are mild. There is a hx of asthma, allergic rhinitis and tobacco use. There have not been any sick contacts. .      Encounter Diagnoses   Name Primary?  Chronic rhinitis Yes    Gastroesophageal reflux disease without esophagitis     Tobacco abuse          Current Outpatient Medications:     fluticasone propionate (FLONASE) 50 mcg/actuation nasal spray, 2 Sprays by Both Nostrils route daily. , Disp: 1 Bottle, Rfl: 1    loratadine (CLARITIN) 10 mg tablet, Take 1 Tab by mouth daily. , Disp: 30 Tab, Rfl: 3    guaiFENesin ER (MUCINEX) 600 mg ER tablet, Take 1 Tab by mouth two (2) times a day., Disp: 60 Tab, Rfl: 1    losartan (COZAAR) 100 mg tablet, TAKE ONE TABLET BY MOUTH EVERY DAY, Disp: 30 Tab, Rfl: 0    pravastatin (PRAVACHOL) 10 mg tablet, TAKE ONE TABLET BY MOUTH NIGHTLY, Disp: 30 Tab, Rfl: 2    carBAMazepine (TEGretol) 100 mg chewable tablet, CHEW AND SWALLOW 1 TABLET BY MOUTH 3 TIMES DAILY, Disp: 90 Tab, Rfl: 2    omeprazole (PRILOSEC) 40 mg capsule, TAKE ONE CAPSULE BY MOUTH EVERY DAY, Disp: 30 Cap, Rfl: 3    FLUoxetine (PROzac) 40 mg capsule, TAKE ONE CAPSULE BY MOUTH EVERY DAY, Disp: 30 Cap, Rfl: 2    metoprolol tartrate (LOPRESSOR) 25 mg tablet, Take 0.5 Tabs by mouth daily. , Disp: 30 Tab, Rfl: 3    hydroCHLOROthiazide (HYDRODIURIL) 12.5 mg tablet, Take 1 Tab by mouth daily. , Disp: 30 Tab, Rfl: 0    budesonide (PULMICORT FLEXHALER) 90 mcg/actuation aepb inhaler, Take 1 Puff by inhalation two (2) times a day., Disp: 1 Inhaler, Rfl: 3    diphenhydrAMINE (BENADRYL ALLERGY) 25 mg tablet, Take 1 Tab by mouth every six (6) hours as needed (Allergies). , Disp: 30 Tab, Rfl: 1    albuterol (PROVENTIL HFA, VENTOLIN HFA, PROAIR HFA) 90 mcg/actuation inhaler, Take 2 Puffs by inhalation every four (4) hours as needed for Shortness of Breath., Disp: 1 Inhaler, Rfl: 0    aspirin delayed-release 81 mg tablet, Take  by mouth daily. 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 06/11/2014    Allergic arthritis involving hand 06/11/2014    Neurogenic pain 04/04/2014    Hearing loss of both ears 09/23/2013    Hyperlipidemia 02/19/2013    Back pain 07/19/2012    Chronic SI joint pain 07/19/2012    Chest pain, unspecified 04/17/2012    Hand pain 02/27/2012    Tobacco abuse 02/27/2012    HTN (hypertension) 02/27/2012    Tinnitus 02/27/2012        Review of Systems:  Constitutional: Negative for fatigue, malaise  Resp: Negative for cough, wheezing or SOB  CV: Negative for chest pain, dizziness or palpitations  GI: Negative for nausea or abdominal pain  MS: Negative for acute myalgias or arthralgias   Neuro: Negative for HA, weakness or paresthesia  Psych: Negative for depression or anxiety       Assessment/Plan:  Details of this discussion including any medical advice provided: Patient advised as a precaution to stay at home, practice regular hand washing with soap and warm water and to wear a mask and utilize social distancing when necessary to be out in public places. ICD-10-CM ICD-9-CM    1. Chronic rhinitis  J31.0 472.0 fluticasone propionate (FLONASE) 50 mcg/actuation nasal spray      loratadine (CLARITIN) 10 mg tablet      guaiFENesin ER (MUCINEX) 600 mg ER tablet   2. Gastroesophageal reflux disease without esophagitis  K21.9 530.81    3. Tobacco abuse  Z72.0 305.1      Strongly advised patient to stop all use of tobacco products. Symptomatic therapy suggested: call prn if symptoms persist or worsen. Total Time: minutes: 11-20 minutes was spent on phone discussing above problems and plan. Patient medical history, prior OV notes, vitals flow sheet, lab results, medications, and allergies were reviewed during this encounter. For phone encounters:  I affirm this is a patient initiated episode with an established Patient who has not had a related appointment within my department in the past 7 days or scheduled within the next 24 hours.     Note: not billable if this call serves to triage the patient into an appointment for the relevant concern        MD ANIA Carrion & ABEL FITCH [facility]  10/23/20         This is the Subsequent Medicare Annual Wellness Exam, performed 12 months or more after the Initial AWV or the last Subsequent AWV    I have reviewed the patient's medical history in detail and updated the computerized patient record. History     Patient Active Problem List   Diagnosis Code    Hand pain M79.643    Tobacco abuse Z72.0    HTN (hypertension) I10    Tinnitus H93.19    Chest pain, unspecified R07.9    Back pain M54.9    Chronic SI joint pain M53.3, G89.29    Hyperlipidemia E78.5    Hearing loss of both ears H91.93    Neurogenic pain M79.2    Skull fracture (HCC) S02. 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    Recurrent depression ([facility]) F33.9    Non-compliance Z91.19    Simple chronic bronchitis ([facility]) J41.0    Chronic rhinitis J31.0     Past Medical History:   Diagnosis Date    [facility]     H/O seasonal allergies     Hypertension       History reviewed. No pertinent surgical history. Current Outpatient Medications   Medication Sig Dispense Refill    fluticasone propionate (FLONASE) 50 mcg/actuation nasal spray 2 Sprays by Both Nostrils route daily. 1 Bottle 1    loratadine (CLARITIN) 10 mg tablet Take 1 Tab by mouth daily. 30 Tab 3    guaiFENesin ER (MUCINEX) 600 mg ER tablet Take 1 Tab by mouth two (2) times a day. 60 Tab 1    losartan (COZAAR) 100 mg tablet TAKE ONE TABLET BY MOUTH EVERY DAY 30 Tab 0    pravastatin (PRAVACHOL) 10 mg tablet TAKE ONE TABLET BY MOUTH NIGHTLY 30 Tab 2    carBAMazepine (TEGretol) 100 mg chewable tablet CHEW AND SWALLOW 1 TABLET BY MOUTH 3 TIMES DAILY 90 Tab 2    omeprazole (PRILOSEC) 40 mg capsule TAKE ONE CAPSULE BY MOUTH EVERY DAY 30 Cap 3    FLUoxetine (PROzac) 40 mg capsule TAKE ONE CAPSULE BY MOUTH EVERY DAY 30 Cap 2    metoprolol tartrate (LOPRESSOR) 25 mg tablet Take 0.5 Tabs by mouth daily. 30 Tab 3    hydroCHLOROthiazide (HYDRODIURIL) 12.5 mg tablet Take 1 Tab by mouth daily. 30 Tab 0    budesonide (PULMICORT FLEXHALER) 90 mcg/actuation aepb inhaler Take 1 Puff by inhalation two (2) times a day. 1 Inhaler 3    diphenhydrAMINE (BENADRYL ALLERGY) 25 mg tablet Take 1 Tab by mouth every six (6) hours as needed (Allergies). 30 Tab 1    albuterol (PROVENTIL HFA, VENTOLIN HFA, PROAIR HFA) 90 mcg/actuation inhaler Take 2 Puffs by inhalation every four (4) hours as needed for Shortness of Breath. 1 Inhaler 0    aspirin delayed-release 81 mg tablet Take  by mouth daily. No Known Allergies    Family History   Family history unknown: Yes     Social History     Tobacco Use    Smoking status: Current Every Day Smoker     Packs/day: 1.00     Types: Cigars     Last attempt to quit: 10/6/2018     Years since quittin.0    Smokeless tobacco: Never Used   Substance Use Topics    Alcohol use: No       Depression Risk Factor Screening:     3 most recent PHQ Screens 2020   Little interest or pleasure in doing things Not at all   Feeling down, depressed, irritable, or hopeless Not at all   Total Score PHQ 2 0       Alcohol Risk Screen   Do you average more than 2 drinks per night or 14 drinks a week: No    On any one occasion in the past three months have you have had more than 4 drinks containing alcohol:  No        Functional Ability and Level of Safety:   Hearing: Hearing is poor. Activities of Daily Living: The home contains: no safety equipment. Patient does total self care     Ambulation: with no difficulty     Fall Risk:  Fall Risk Assessment, last 12 mths 2020   Able to walk? Yes   Fall in past 12 months?  No   Fall with injury? -   Number of falls in past 12 months -   Fall Risk Score -     Abuse Screen:  Patient is not abused       Cognitive Screening   Has your family/caregiver stated any concerns about your memory: Patient with hx of TBI        Patient Care Team   Patient Care Team:  Juan Jose Gilbert MD as PCP - General (Family Medicine)  Juan Jose Gilbert MD as PCP - [facility] Empaneled Provider  Clementine Stovall MD (Rheumatology)  Mariluz Soriano MD (Otolaryngology)  Sedrick Aguirre MD (Cardiology)  Talya Jeff MD (Neurology)    Assessment/Plan   Education and counseling provided:  Are appropriate based on today's review and evaluation  End-of-Life planning (with patient's consent)    Diagnoses and all orders for this visit:    1. Medicare annual wellness visit, subsequent    2.  Screening for depression  -     María Mullins MD

## 2020-10-23 NOTE — PATIENT INSTRUCTIONS
Medicare Wellness Visit, Male The best way to live healthy is to have a lifestyle where you eat a well-balanced diet, exercise regularly, limit alcohol use, and quit all forms of tobacco/nicotine, if applicable. Regular preventive services are another way to keep healthy. Preventive services (vaccines, screening tests, monitoring & exams) can help personalize your care plan, which helps you manage your own care. Screening tests can find health problems at the earliest stages, when they are easiest to treat. [facility] follows the current, evidence-based guidelines published by the [facility] Tayo Leslie ([facility]) when recommending preventive services for our patients. Because we follow these guidelines, sometimes recommendations change over time as research supports it. (For example, a prostate screening blood test is no longer routinely recommended for men with no symptoms). Of course, you and your doctor may decide to screen more often for some diseases, based on your risk and co-morbidities (chronic disease you are already diagnosed with). Preventive services for you include: - Medicare offers their members a free annual wellness visit, which is time for you and your primary care provider to discuss and plan for your preventive service needs. Take advantage of this benefit every year! 
-All adults over age 72 should receive the recommended pneumonia vaccines. Current USPSTF guidelines recommend a series of two vaccines for the best pneumonia protection.  
-All adults should have a flu vaccine yearly and tetanus vaccine every 10 years. 
-All adults age 48 and older should receive the shingles vaccines (series of two vaccines).       
-All adults age 38-68 who are overweight should have a diabetes screening test once every three years.  
-Other screening tests & preventive services for persons with diabetes include: an eye exam to screen for diabetic retinopathy, a kidney function test, a foot exam, and stricter control over your cholesterol.  
-Cardiovascular screening for adults with routine risk involves an electrocardiogram (ECG) at intervals determined by the provider.  
-Colorectal cancer screening should be done for adults age 54-65 with no increased risk factors for colorectal cancer. There are a number of acceptable methods of screening for this type of cancer. Each test has its own benefits and drawbacks. Discuss with your provider what is most appropriate for you during your annual wellness visit. The different tests include: colonoscopy (considered the best screening method), a fecal occult blood test, a fecal DNA test, and sigmoidoscopy. 
-All adults born between [facility] should be screened once for Hepatitis C. 
-An Abdominal Aortic Aneurysm (AAA) Screening is recommended for men age 73-68 who has ever smoked in their lifetime. Here is a list of your current Health Maintenance items (your personalized list of preventive services) with a due date: 
Health Maintenance Due Topic Date Due  
 Colorectal Screening  12/10/2015 [address] [place] [address] Annual Well Visit  10/30/2020

## 2021-03-29 ENCOUNTER — OFFICE VISIT (OUTPATIENT)
Dept: FAMILY MEDICINE CLINIC | Age: 66
End: 2021-03-29
Payer: MEDICARE

## 2021-03-29 VITALS
TEMPERATURE: 98.1 F | HEIGHT: 68 IN | RESPIRATION RATE: 20 BRPM | SYSTOLIC BLOOD PRESSURE: 160 MMHG | BODY MASS INDEX: 33.74 KG/M2 | DIASTOLIC BLOOD PRESSURE: 98 MMHG | HEART RATE: 66 BPM | WEIGHT: 222.6 LBS | OXYGEN SATURATION: 99 %

## 2021-03-29 DIAGNOSIS — J44.9 CHRONIC OBSTRUCTIVE PULMONARY DISEASE, UNSPECIFIED COPD TYPE (HCC): ICD-10-CM

## 2021-03-29 DIAGNOSIS — I10 ESSENTIAL HYPERTENSION: Primary | ICD-10-CM

## 2021-03-29 DIAGNOSIS — E78.5 HYPERLIPIDEMIA, UNSPECIFIED HYPERLIPIDEMIA TYPE: ICD-10-CM

## 2021-03-29 DIAGNOSIS — R56.9 CONVULSIONS, UNSPECIFIED CONVULSION TYPE (HCC): ICD-10-CM

## 2021-03-29 DIAGNOSIS — F33.9 RECURRENT DEPRESSION (HCC): ICD-10-CM

## 2021-03-29 PROCEDURE — G8755 DIAS BP > OR = 90: HCPCS | Performed by: FAMILY MEDICINE

## 2021-03-29 PROCEDURE — G8417 CALC BMI ABV UP PARAM F/U: HCPCS | Performed by: FAMILY MEDICINE

## 2021-03-29 PROCEDURE — 3017F COLORECTAL CA SCREEN DOC REV: CPT | Performed by: FAMILY MEDICINE

## 2021-03-29 PROCEDURE — G9717 DOC PT DX DEP/BP F/U NT REQ: HCPCS | Performed by: FAMILY MEDICINE

## 2021-03-29 PROCEDURE — 99214 OFFICE O/P EST MOD 30 MIN: CPT | Performed by: FAMILY MEDICINE

## 2021-03-29 PROCEDURE — G8427 DOCREV CUR MEDS BY ELIG CLIN: HCPCS | Performed by: FAMILY MEDICINE

## 2021-03-29 PROCEDURE — G8753 SYS BP > OR = 140: HCPCS | Performed by: FAMILY MEDICINE

## 2021-03-29 NOTE — PROGRESS NOTES
Progress Note    Patient: Nataly Mann. MRN: [id number]  SSN: [id number]    YOB: 1955  Age: 63 y.o. Sex: male        Subjective:     Chief Complaint   Patient presents with    Hypertension    Follow Up Chronic Condition    Cholesterol Problem       HPI: he is a 63y.o. year old male who presents for follow up of his chronic health conditions. Patient with hx of skull fracture with resultant depression, neurogenic pain and hearing loss. He says he mood is stable with current dose of fluoxetine. Patient denies HA, CP, dysuria, acute myalgias or arthralgias. BP well uncontrolled today. He continues to smoke. Hypertension:  The patient reports he is taking medications as instructed, no medication side effects noted, no TIA's, no chest pain on exertion, notes new dyspnea on exertion, no swelling of ankles. BP Readings from Last 3 Encounters:   03/29/21 (!) 160/98   02/05/20 122/83   01/22/20 (!) 166/105     Patient advised to log blood pressures at home 2-3 times weekly and bring to next visit. Call office as soon as possible if BP's over 140/90 on multiple occasions or with symptoms of dizziness, chest pain, shortness of breath, headache or ankle swelling. Our goal is to normalize the blood pressure to decrease the risks of strokes and heart attacks. The patient is in agreement with the plan.       Current and past medical information:    Current Medications after this visit[de-identified]     Current Outpatient Medications   Medication Sig    losartan (COZAAR) 100 mg tablet TAKE ONE TABLET BY MOUTH EVERY DAY    pravastatin (PRAVACHOL) 10 mg tablet TAKE ONE TABLET BY MOUTH NIGHTLY    carBAMazepine (TEGretol) 100 mg chewable tablet CHEW AND SWALLOW 1 TABLET BY MOUTH 3 TIMES DAILY    FLUoxetine (PROzac) 40 mg capsule TAKE ONE CAPSULE BY MOUTH EVERY DAY    metoprolol tartrate (LOPRESSOR) 25 mg tablet TAKE 1/2 TABLET BY MOUTH DAILY    omeprazole (PRILOSEC) 40 mg capsule TAKE ONE CAPSULE BY MOUTH EVERY DAY    diphenhydrAMINE (BENADRYL ALLERGY) 25 mg tablet Take 1 Tab by mouth every six (6) hours as needed (Allergies).  aspirin delayed-release 81 mg tablet Take  by mouth daily. No current facility-administered medications for this visit. Patient Active Problem List    Diagnosis Date Noted    Non-compliance 2019    Simple chronic bronchitis ([facility] 2019    Chronic rhinitis 2019    Rec[facility] ([facility] 2018    Skull fracture ([facility] 2014    Depression due to head injury 2014    Allergic arthritis involving hand 2014    Neurogenic pain 2014    Hearing loss of both ears 2013    Hyperlipidemia 2013    Back pain 2012    Chronic SI joint pain 2012    Chest pain, unspecified 2012    Hand pain 2012    Tobacco abuse 2012    HTN (hypertension) 2012    Tinnitus 2012       Past Medical History:   Diagnosis Date    Chronic bronchitis ([facility]     H/O seasonal allergies     Hypertension        No Known Allergies    History reviewed. No pertinent surgical history.     Social History     Socioeconomic History    Marital status: SINGLE     Spouse name: Not on file    Number of children: Not on file    Years of education: Not on file    Highest education level: Not on file   Tobacco Use    Smoking status: Current Every Day Smoker     Packs/day: 1.00     Types: Cigars     Last attempt to quit: 10/6/2018     Years since quittin.4    Smokeless tobacco: Never Used   Substance and Sexual Activity    Alcohol use: No    Drug use: No         Objective:     Review of Systems:  Constitutional: Negative for fatigue or malaise  Derm: Negative for rash or lesion  HEENT: Negative for acute hearing or vision changes  Cardiovascular: Negative for dizziness, chest pain or palpitations  Respiratory: Negative for productive cough and wheezing  Gastreintestinal: Negative for N/V/D/C  Musculoskeletal: Negative for myalgias or arthralgias   Neurological: see HPI, Negative for weakness or paresthesia  Psychological: see HPI      Vitals:    03/29/21 1100 03/29/21 1108   BP: (!) 164/104 (!) 160/98   Pulse: 66    Resp: 20    Temp: 98.1 °F (36.7 °C)    TempSrc: Temporal    SpO2: 99%    Weight: 222 lb 9.6 oz (101 kg)    Height: 5' 8\" (1.727 m)       Body mass index is 33.85 kg/m². Physical Exam:  Constitutional: well developed, well nourished, in no acute distress  Skin: warm and dry, normal tone and turgor  Head: normocephalic, atraumatic  Eyes: sclera clear, EOMI  Neck: normal range of motion  CV: normal S1, S2, regular rate and rhythm  Respiratory: clear with symmetrical effort  Extremities: full range of motion, antalgic gait  Neurology: normal strength and sensation  Psych: active, alert and oriented, affect appropriate       Assessment and orders:     Diagnoses and all orders for this visit:    1. Essential hypertension  -     METABOLIC PANEL, COMPREHENSIVE; Future  -     CBC W/O DIFF; Future  -     TSH 3RD GENERATION; Future    2. Hyperlipidemia, unspecified hyperlipidemia type  -     LIPID PANEL; Future  -     METABOLIC PANEL, COMPREHENSIVE; Future    3. Convulsions, unspecified convulsion type ([address]  -     CARBAMAZEPINE; Future        Plan of care:  Diagnoses were discussed in detail with patient. Strongly advised patient to stop all use of tobacco products. Medication risks/benefits/side effects discussed with patient. All of the patient's questions were addressed and answered to apparent satisfaction. The patient understands and agrees with our plan of care. The patient knows to call back if they have questions about the plan of care or if symptoms change. The patient received an After-Visit Summary which contains VS, diagnoses, orders, allergy and medication lists. No future appointments.     Patient Care Team:  Tom Grider MD as PCP - General (Family Medicine)  Tom Grider MD as PCP [facility] Empaneled Provider  Marvin Phan MD (Rheumatology)  Grace Edmondson MD (Otolaryngology)  April Almazan MD (Cardiology)  Alirio Olivares MD (Neurology)        No future appointments.     Signed By: Mary Johnston MD     March 29, 2021

## 2021-03-29 NOTE — PROGRESS NOTES
1. Have you been to the ER, urgent care clinic since your last visit? Hospitalized since your last visit? No    2. Have you seen or consulted any other health care providers outside of the [address] [place] [address] since your last visit? Include any pap smears or colon screening. No    Reviewed record in preparation for visit and have necessary documentation  Goals that were addressed and/or need to be completed during or after this appointment include     Health Maintenance Due   Topic Date Due    COVID-19 Vaccine (1) Never done    Colorectal Cancer Screening Combo  12/10/2015    Flu Vaccine (1) 09/01/2020       Patient is accompanied by self I have received verbal consent from Angela Jones. to discuss any/all medical information while they are present in the room.

## 2021-03-30 LAB
ALBUMIN SERPL-MCNC: 4.1 G/DL (ref 3.5–5)
ALBUMIN/GLOB SERPL: 1.5 {RATIO} (ref 1.1–2.2)
ALP SERPL-CCNC: 114 U/L (ref 45–117)
ALT SERPL-CCNC: 30 U/L (ref 12–78)
ANION GAP SERPL CALC-SCNC: 5 MMOL/L (ref 5–15)
AST SERPL-CCNC: 21 U/L (ref 15–37)
BILIRUB SERPL-MCNC: 0.3 MG/DL (ref 0.2–1)
BUN SERPL-MCNC: 14 MG/DL (ref 6–20)
BUN/CREAT SERPL: 17 (ref 12–20)
CALCIUM SERPL-MCNC: 8.5 MG/DL (ref 8.5–10.1)
CARBAMAZEPINE SERPL-MCNC: 6.8 UG/ML (ref 4–12)
CHLORIDE SERPL-SCNC: 105 MMOL/L (ref 97–108)
CHOLEST SERPL-MCNC: 202 MG/DL
CO2 SERPL-SCNC: 29 MMOL/L (ref 21–32)
CREAT SERPL-MCNC: 0.83 MG/DL (ref 0.7–1.3)
ERYTHROCYTE [DISTWIDTH] IN BLOOD BY AUTOMATED COUNT: 12.3 % (ref 11.5–14.5)
GLOBULIN SER CALC-MCNC: 2.8 G/DL (ref 2–4)
GLUCOSE SERPL-MCNC: 91 MG/DL (ref 65–100)
HCT VFR BLD AUTO: 48 % (ref 36.6–50.3)
HDLC SERPL-MCNC: 48 MG/DL
HDLC SERPL: 4.2 {RATIO} (ref 0–5)
HGB BLD-MCNC: 14.9 G/DL (ref 12.1–17)
LDLC SERPL CALC-MCNC: 114.2 MG/DL (ref 0–100)
LIPID PROFILE,FLP: ABNORMAL
MCH RBC QN AUTO: 32.1 PG (ref 26–34)
MCHC RBC AUTO-ENTMCNC: 31 G/DL (ref 30–36.5)
MCV RBC AUTO: 103.4 FL (ref 80–99)
NRBC # BLD: 0 K/UL (ref 0–0.01)
NRBC BLD-RTO: 0 PER 100 WBC
PLATELET # BLD AUTO: 235 K/UL (ref 150–400)
PMV BLD AUTO: 10.3 FL (ref 8.9–12.9)
POTASSIUM SERPL-SCNC: 5.2 MMOL/L (ref 3.5–5.1)
PROT SERPL-MCNC: 6.9 G/DL (ref 6.4–8.2)
RBC # BLD AUTO: 4.64 M/UL (ref 4.1–5.7)
SODIUM SERPL-SCNC: 139 MMOL/L (ref 136–145)
TRIGL SERPL-MCNC: 199 MG/DL (ref ?–150)
TSH SERPL DL<=0.05 MIU/L-ACNC: 1.06 UIU/ML (ref 0.36–3.74)
VLDLC SERPL CALC-MCNC: 39.8 MG/DL
WBC # BLD AUTO: 5.4 K/UL (ref 4.1–11.1)

## 2021-04-22 ENCOUNTER — TELEPHONE (OUTPATIENT)
Dept: FAMILY MEDICINE CLINIC | Age: 66
End: 2021-04-22

## 2021-04-22 RX ORDER — FLUTICASONE PROPIONATE 50 MCG
2 SPRAY, SUSPENSION (ML) NASAL DAILY
Qty: 1 BOTTLE | Refills: 2 | Status: SHIPPED | OUTPATIENT
Start: 2021-04-22 | End: 2022-04-08 | Stop reason: SDUPTHER

## 2021-04-22 RX ORDER — LEVOCETIRIZINE DIHYDROCHLORIDE 5 MG/1
5 TABLET, FILM COATED ORAL DAILY
Qty: 30 TAB | Refills: 2 | Status: SHIPPED | OUTPATIENT
Start: 2021-04-22 | End: 2022-04-08 | Stop reason: SDUPTHER

## 2021-04-22 NOTE — TELEPHONE ENCOUNTER
----- Message from Inderjit Morrison sent at 4/22/2021  9:59 AM EDT -----  Regarding: Dr. Messi Mullen Message/Vendor Calls    Caller's first and last name: n/a      Reason for call: Wants to know what he can take for allergies      Callback required yes/no and why: yes      Best contact number(s): [phone]      Details to clarify the request: n/a      Inderjit Morrison

## 2021-04-22 NOTE — TELEPHONE ENCOUNTER
Called patient. He stated that he has a scratchy throat and runny nose. He has been taking Benadryl Allergy three times a day.

## 2021-06-29 DIAGNOSIS — I10 ESSENTIAL HYPERTENSION: ICD-10-CM

## 2021-06-29 RX ORDER — LOSARTAN POTASSIUM 100 MG/1
TABLET ORAL
Qty: 90 TABLET | Refills: 0 | Status: SHIPPED | OUTPATIENT
Start: 2021-06-29 | End: 2021-09-29 | Stop reason: SDUPTHER

## 2021-06-29 NOTE — TELEPHONE ENCOUNTER
RE: Gregory Kaufman    His medication was refilled but his BP is out of control. Please schedule an appt. And have him bring in some BP readings from home.     Thank you,  Dr. Amando Gar

## 2021-07-07 DIAGNOSIS — K21.9 GASTROESOPHAGEAL REFLUX DISEASE WITHOUT ESOPHAGITIS: ICD-10-CM

## 2021-07-07 NOTE — TELEPHONE ENCOUNTER
Attempted to call. No answer. Message left.   Would like to schedule appt for blood pressure follow up

## 2021-07-10 RX ORDER — OMEPRAZOLE 40 MG/1
CAPSULE, DELAYED RELEASE ORAL
Qty: 30 CAPSULE | Refills: 2 | Status: SHIPPED | OUTPATIENT
Start: 2021-07-10 | End: 2021-09-29 | Stop reason: SDUPTHER

## 2021-09-10 ENCOUNTER — TELEPHONE (OUTPATIENT)
Dept: FAMILY MEDICINE CLINIC | Age: 66
End: 2021-09-10

## 2021-09-10 DIAGNOSIS — I10 ESSENTIAL HYPERTENSION: ICD-10-CM

## 2021-09-10 DIAGNOSIS — R56.9 CONVULSIONS, UNSPECIFIED CONVULSION TYPE (HCC): ICD-10-CM

## 2021-09-12 RX ORDER — CARBAMAZEPINE 100 MG/1
TABLET, CHEWABLE ORAL
Qty: 90 TABLET | Refills: 2 | Status: SHIPPED | OUTPATIENT
Start: 2021-09-12 | End: 2021-11-15

## 2021-09-12 RX ORDER — METOPROLOL TARTRATE 25 MG/1
TABLET, FILM COATED ORAL
Qty: 45 TABLET | Refills: 0 | Status: SHIPPED | OUTPATIENT
Start: 2021-09-12 | End: 2021-09-29 | Stop reason: SDUPTHER

## 2021-09-17 DIAGNOSIS — F32.A DEPRESSION DUE TO HEAD INJURY: ICD-10-CM

## 2021-09-17 DIAGNOSIS — S09.90XA DEPRESSION DUE TO HEAD INJURY: ICD-10-CM

## 2021-09-20 DIAGNOSIS — E78.5 HYPERLIPIDEMIA, UNSPECIFIED HYPERLIPIDEMIA TYPE: ICD-10-CM

## 2021-09-20 RX ORDER — FLUOXETINE HYDROCHLORIDE 40 MG/1
40 CAPSULE ORAL DAILY
Qty: 90 CAPSULE | Refills: 0 | Status: SHIPPED | OUTPATIENT
Start: 2021-09-20 | End: 2021-09-29 | Stop reason: SDUPTHER

## 2021-09-20 RX ORDER — PRAVASTATIN SODIUM 10 MG/1
10 TABLET ORAL
Qty: 90 TABLET | Refills: 0 | OUTPATIENT
Start: 2021-09-20

## 2021-09-29 ENCOUNTER — OFFICE VISIT (OUTPATIENT)
Dept: FAMILY MEDICINE CLINIC | Age: 66
End: 2021-09-29
Payer: MEDICARE

## 2021-09-29 VITALS
BODY MASS INDEX: 34.25 KG/M2 | TEMPERATURE: 98 F | OXYGEN SATURATION: 98 % | SYSTOLIC BLOOD PRESSURE: 161 MMHG | DIASTOLIC BLOOD PRESSURE: 101 MMHG | HEIGHT: 68 IN | HEART RATE: 76 BPM | WEIGHT: 226 LBS | RESPIRATION RATE: 20 BRPM

## 2021-09-29 DIAGNOSIS — F32.A DEPRESSION DUE TO HEAD INJURY: ICD-10-CM

## 2021-09-29 DIAGNOSIS — I10 ESSENTIAL HYPERTENSION: Primary | ICD-10-CM

## 2021-09-29 DIAGNOSIS — R56.9 CONVULSIONS, UNSPECIFIED CONVULSION TYPE (HCC): ICD-10-CM

## 2021-09-29 DIAGNOSIS — M54.2 CERVICALGIA: ICD-10-CM

## 2021-09-29 DIAGNOSIS — F51.04 PSYCHOPHYSIOLOGICAL INSOMNIA: ICD-10-CM

## 2021-09-29 DIAGNOSIS — E78.5 HYPERLIPIDEMIA, UNSPECIFIED HYPERLIPIDEMIA TYPE: ICD-10-CM

## 2021-09-29 DIAGNOSIS — K21.9 GASTROESOPHAGEAL REFLUX DISEASE WITHOUT ESOPHAGITIS: ICD-10-CM

## 2021-09-29 DIAGNOSIS — S09.90XA DEPRESSION DUE TO HEAD INJURY: ICD-10-CM

## 2021-09-29 LAB
ALBUMIN SERPL-MCNC: 4 G/DL (ref 3.5–5)
ALBUMIN/GLOB SERPL: 1.2 {RATIO} (ref 1.1–2.2)
ALP SERPL-CCNC: 130 U/L (ref 45–117)
ALT SERPL-CCNC: 28 U/L (ref 12–78)
ANION GAP SERPL CALC-SCNC: 5 MMOL/L (ref 5–15)
AST SERPL-CCNC: 21 U/L (ref 15–37)
BILIRUB SERPL-MCNC: 0.3 MG/DL (ref 0.2–1)
BUN SERPL-MCNC: 15 MG/DL (ref 6–20)
BUN/CREAT SERPL: 15 (ref 12–20)
CALCIUM SERPL-MCNC: 9.4 MG/DL (ref 8.5–10.1)
CARBAMAZEPINE SERPL-MCNC: 7.1 UG/ML (ref 4–12)
CHLORIDE SERPL-SCNC: 105 MMOL/L (ref 97–108)
CHOLEST SERPL-MCNC: 210 MG/DL
CO2 SERPL-SCNC: 30 MMOL/L (ref 21–32)
CREAT SERPL-MCNC: 1 MG/DL (ref 0.7–1.3)
GLOBULIN SER CALC-MCNC: 3.3 G/DL (ref 2–4)
GLUCOSE SERPL-MCNC: 95 MG/DL (ref 65–100)
HCT VFR BLD AUTO: 47.2 % (ref 36.6–50.3)
HDLC SERPL-MCNC: 57 MG/DL
HDLC SERPL: 3.7 {RATIO} (ref 0–5)
HGB BLD-MCNC: 15 G/DL (ref 12.1–17)
LDLC SERPL CALC-MCNC: 118.8 MG/DL (ref 0–100)
POTASSIUM SERPL-SCNC: 5.1 MMOL/L (ref 3.5–5.1)
PROT SERPL-MCNC: 7.3 G/DL (ref 6.4–8.2)
SODIUM SERPL-SCNC: 140 MMOL/L (ref 136–145)
TRIGL SERPL-MCNC: 171 MG/DL (ref ?–150)
TSH SERPL DL<=0.05 MIU/L-ACNC: 1.2 UIU/ML (ref 0.36–3.74)
VLDLC SERPL CALC-MCNC: 34.2 MG/DL

## 2021-09-29 PROCEDURE — G8417 CALC BMI ABV UP PARAM F/U: HCPCS | Performed by: FAMILY MEDICINE

## 2021-09-29 PROCEDURE — G8755 DIAS BP > OR = 90: HCPCS | Performed by: FAMILY MEDICINE

## 2021-09-29 PROCEDURE — 3017F COLORECTAL CA SCREEN DOC REV: CPT | Performed by: FAMILY MEDICINE

## 2021-09-29 PROCEDURE — 99214 OFFICE O/P EST MOD 30 MIN: CPT | Performed by: FAMILY MEDICINE

## 2021-09-29 PROCEDURE — G9717 DOC PT DX DEP/BP F/U NT REQ: HCPCS | Performed by: FAMILY MEDICINE

## 2021-09-29 PROCEDURE — G8753 SYS BP > OR = 140: HCPCS | Performed by: FAMILY MEDICINE

## 2021-09-29 PROCEDURE — G8427 DOCREV CUR MEDS BY ELIG CLIN: HCPCS | Performed by: FAMILY MEDICINE

## 2021-09-29 RX ORDER — TRAZODONE HYDROCHLORIDE 50 MG/1
50 TABLET ORAL
Qty: 90 TABLET | Refills: 0 | Status: SHIPPED | OUTPATIENT
Start: 2021-09-29 | End: 2022-05-03

## 2021-09-29 RX ORDER — LOSARTAN POTASSIUM 100 MG/1
100 TABLET ORAL DAILY
Qty: 90 TABLET | Refills: 1 | Status: SHIPPED | OUTPATIENT
Start: 2021-09-29 | End: 2022-01-06

## 2021-09-29 RX ORDER — PRAVASTATIN SODIUM 20 MG/1
20 TABLET ORAL
Qty: 90 TABLET | Refills: 1 | Status: SHIPPED | OUTPATIENT
Start: 2021-09-29 | End: 2022-05-03

## 2021-09-29 RX ORDER — OMEPRAZOLE 40 MG/1
CAPSULE, DELAYED RELEASE ORAL
Qty: 90 CAPSULE | Refills: 1 | Status: SHIPPED | OUTPATIENT
Start: 2021-09-29 | End: 2022-01-12

## 2021-09-29 RX ORDER — METOPROLOL TARTRATE 25 MG/1
12.5 TABLET, FILM COATED ORAL DAILY
Qty: 45 TABLET | Refills: 2 | Status: SHIPPED | OUTPATIENT
Start: 2021-09-29 | End: 2022-01-06 | Stop reason: SDUPTHER

## 2021-09-29 RX ORDER — FLUOXETINE HYDROCHLORIDE 40 MG/1
40 CAPSULE ORAL DAILY
Qty: 90 CAPSULE | Refills: 1 | Status: SHIPPED | OUTPATIENT
Start: 2021-09-29 | End: 2022-03-22

## 2021-09-29 NOTE — PROGRESS NOTES
1. Have you been to the ER, urgent care clinic since your last visit? Hospitalized since your last visit? No    2. Have you seen or consulted any other health care providers outside of the [address] [place] [address] since your last visit? Include any pap smears or colon screening. No    Reviewed record in preparation for visit and have necessary documentation  Goals that were addressed and/or need to be completed during or after this appointment include     Health Maintenance Due   Topic Date Due    COVID-19 Vaccine (1) Never done    Colorectal Cancer Screening Combo  12/10/2015    Flu Vaccine (1) 09/01/2021    Medicare Yearly Exam  10/24/2021       Patient is accompanied by self I have received verbal consent from Vianey Hensley. to discuss any/all medical information while they are present in the room.

## 2021-09-30 NOTE — PROGRESS NOTES
Progress Note    Patient: Kennedy Ochoa. MRN: [id number]  SSN: [id number]    YOB: 1955  Age: 61 y.o. Sex: male        Subjective:     Chief Complaint   Patient presents with    Follow Up Chronic Condition       HPI: he is a 61y.o. year old male who presents for follow up of his chronic health conditions. Patient with hx of skull fracture with resultant depression, neurogenic pain and hearing loss. He says his mood is stable with current dose of fluoxetine. He complains of insomnia. Also with c/o neck pain. Patient denies HA, CP, dysuria, acute weakness or paresthesia. He continues to smoke. Hypertension:  The patient reports he is taking medications as instructed, no medication side effects noted, no TIA's, no chest pain on exertion, notes new dyspnea on exertion, no swelling of ankles. BP Readings from Last 3 Encounters:   09/29/21 (!) 161/101   03/29/21 (!) 160/98   02/05/20 122/83     Patient advised to log blood pressures at home weekly and bring to next visit. Call office as soon as possible if BP's over 140/90 on multiple occasions or with symptoms of dizziness, chest pain, shortness of breath, headache or ankle swelling. Our goal is to normalize the blood pressure to decrease the risks of strokes and heart attacks. The patient is in agreement with the plan. Current and past medical information:    Current Medications after this visit[de-identified]     Current Outpatient Medications   Medication Sig    multivit-mins/iron/folic/lycop (CENTRUM MEN PO) Take 1 Tablet by mouth daily.  omeprazole (PRILOSEC) 40 mg capsule Take one capsule by mouth every day  Indications: gastroesophageal reflux disease    pravastatin (PRAVACHOL) 20 mg tablet Take 1 Tablet by mouth nightly. Indications: high cholesterol    metoprolol tartrate (LOPRESSOR) 25 mg tablet Take 0.5 Tablets by mouth daily. Indications: high blood pressure    FLUoxetine (PROzac) 40 mg capsule Take 1 Capsule by mouth daily. Indications: anxiousness associated with depression    losartan (COZAAR) 100 mg tablet Take 1 Tablet by mouth daily. Indications: high blood pressure    traZODone (DESYREL) 50 mg tablet Take 1 Tablet by mouth nightly. Indications: insomnia    carBAMazepine (TEGretol) 100 mg chewable tablet CHEW AND SWALLOW 1 TABLET BY MOUTH 3 TIMES DAILY    levocetirizine (XYZAL) 5 mg tablet Take 1 Tab by mouth daily.  fluticasone propionate (FLONASE) 50 mcg/actuation nasal spray 2 Sprays by Both Nostrils route daily.  diphenhydrAMINE (BENADRYL ALLERGY) 25 mg tablet Take 1 Tab by mouth every six (6) hours as needed (Allergies).  aspirin delayed-release 81 mg tablet Take  by mouth daily. No current facility-administered medications for this visit. Patient Active Problem List    Diagnosis Date Noted    Non-compliance 06/18/2019    Simple chronic bronchitis ([facility] 06/18/2019    Chronic rhinitis 06/18/2019    Recurrent depression ([facility] 02/26/2018    Skull fracture ([facility] 06/11/2014    Depression due to head injury 06/11/2014    Allergic arthritis involving hand 06/11/2014    Neurogenic pain 04/04/2014    Hearing loss of both ears 09/23/2013    Hyperlipidemia 02/19/2013    Back pain 07/19/2012    Chronic SI joint pain 07/19/2012    Chest pain, unspecified 04/17/2012    Hand pain 02/27/2012    Tobacco abuse 02/27/2012    HTN (hypertension) 02/27/2012    Tinnitus 02/27/2012       Past Medical History:   Diagnosis Date    Chronic bronchitis ([facility]     H/O seasonal allergies     Hypertension        No Known Allergies    No past surgical history on file.     Social History     Socioeconomic History    Marital status: SINGLE     Spouse name: Not on file    Number of children: Not on file    Years of education: Not on file    Highest education level: Not on file   Tobacco Use    Smoking status: Current Every Day Smoker     Packs/day: 1.00     Types: Cigars     Last attempt to quit: 10/6/2018     Years since quittin.9    Smokeless tobacco: Never Used   Substance and Sexual Activity    Alcohol use: No    Drug use: No     Social Determinants of Health     Financial Resource Strain:     Difficulty of Paying Living Expenses:    Food Insecurity:     Worried About Running Out of Food in the Last Year:     [address] in the Last Year:    Transportation Needs:     Lack of Transportation (Medical):  Lack of Transportation (Non-Medical):    Physical Activity:     Days of Exercise per Week:     Minutes of Exercise per Session:    Stress:     Feeling of Stress :    Social Connections:     Frequency of Communication with Friends and Family:     Frequency of Social Gatherings with Friends and Family:     Attends [employer] Services:     Active Member of Clubs or Organizations:     Attends Club or Organization Meetings:     Marital Status:          Objective:     Review of Systems:  Constitutional: Negative for fatigue or malaise  Derm: Negative for rash or lesion  HEENT: Negative for acute hearing or vision changes  Cardiovascular: Negative for dizziness, chest pain or palpitations  Respiratory: Negative for productive cough and wheezing  Gastrointestinal: Negative for nausea or abdominal pain  Musculoskeletal: see HPI  Neurological: see HPI, Negative for weakness or paresthesia  Psychological: see HPI      Vitals:    21 1108 21 1131   BP: (!) 161/96 (!) 161/101   Pulse: 76    Resp: 20    Temp: 98 °F (36.7 °C)    TempSrc: Oral    SpO2: 98%    Weight: 226 lb (102.5 kg)    Height: 5' 8\" (1.727 m)       Body mass index is 34.36 kg/m².     Physical Exam:  Constitutional: well developed, well nourished, in no acute distress  Skin: warm and dry, normal tone and turgor  Head: normocephalic, atraumatic  Eyes: sclera clear, EOMI  Neck: normal range of motion  CV: normal S1, S2, regular rate and rhythm  Respiratory: clear with symmetrical effort  Extremities: full range of motion, antalgic gait  Neurology: normal strength and sensation  Psych: active, alert and oriented, affect appropriate       Assessment and orders:     Diagnoses and all orders for this visit:    1. Essential hypertension  -     metoprolol tartrate (LOPRESSOR) 25 mg tablet; Take 0.5 Tablets by mouth daily. Indications: high blood pressure  -     losartan (COZAAR) 100 mg tablet; Take 1 Tablet by mouth daily. Indications: high blood pressure  -     METABOLIC PANEL, COMPREHENSIVE; Future  -     TSH 3RD GENERATION; Future    2. Hyperlipidemia, unspecified hyperlipidemia type  -     pravastatin (PRAVACHOL) 20 mg tablet; Take 1 Tablet by mouth nightly. Indications: high cholesterol  -     METABOLIC PANEL, COMPREHENSIVE; Future  -     LIPID PANEL; Future    3. Gastroesophageal reflux disease without esophagitis  -     omeprazole (PRILOSEC) 40 mg capsule; Take one capsule by mouth every day  Indications: gastroesophageal reflux disease  -     HGB & HCT; Future    4. 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 Indications: anxiousness associated with depression    5. Convulsions, unspecified convulsion type ([address]  -     CARBAMAZEPINE; Future    6. Psychophysiological insomnia  -     traZODone (DESYREL) 50 mg tablet; Take 1 Tablet by mouth nightly. Indications: insomnia    7. Cervicalgia  -     XR SPINE CERV PA LAT ODONT 3 V MAX; Future        Plan of care:  Diagnoses were discussed in detail with patient. Strongly advised patient to stop all use of tobacco products. Medication risks/benefits/side effects discussed with patient. All of the patient's questions were addressed and answered to apparent satisfaction. The patient understands and agrees with our plan of care. The patient knows to call back if they have questions about the plan of care or if symptoms change.   The patient received an After-Visit Summary which contains VS, diagnoses, orders, allergy and medication lists.      Future Appointments   Date Time Provider Vanessa Gutierrez   10/29/2021 11:00 AM Rodriguez Allan MD [facility] BS AMB       Patient Care Team:  Rodriguez Allan MD as PCP - General (Family Medicine)  Rodriguez Allan MD as PCP - REHABILITATION [facility] Empaneled Provider  Warren Wade MD (Rheumatology)  Carie Galloway MD (Otolaryngology)  Lashawn Paz MD (Cardiology)  Lyudmila Santos MD (Neurology)    Follow-up and Dispositions    · Return in about 4 weeks (around 10/27/2021).          Future Appointments   Date Time Provider Vanessa Gutierrez   10/29/2021 11:00 AM Rodriguez Allan MD [facility] BS AMB       Signed By: Rik Cuadra MD     September 29, 2021

## 2021-11-03 ENCOUNTER — OFFICE VISIT (OUTPATIENT)
Dept: FAMILY MEDICINE CLINIC | Age: 66
End: 2021-11-03
Payer: MEDICARE

## 2021-11-03 VITALS
WEIGHT: 227.6 LBS | BODY MASS INDEX: 34.49 KG/M2 | TEMPERATURE: 99.3 F | SYSTOLIC BLOOD PRESSURE: 148 MMHG | OXYGEN SATURATION: 97 % | HEART RATE: 69 BPM | DIASTOLIC BLOOD PRESSURE: 93 MMHG | HEIGHT: 68 IN | RESPIRATION RATE: 14 BRPM

## 2021-11-03 DIAGNOSIS — R56.9 CONVULSIONS, UNSPECIFIED CONVULSION TYPE (HCC): ICD-10-CM

## 2021-11-03 DIAGNOSIS — Z00.00 MEDICARE ANNUAL WELLNESS VISIT, SUBSEQUENT: ICD-10-CM

## 2021-11-03 DIAGNOSIS — J44.9 CHRONIC OBSTRUCTIVE PULMONARY DISEASE, UNSPECIFIED COPD TYPE (HCC): ICD-10-CM

## 2021-11-03 DIAGNOSIS — I10 ESSENTIAL HYPERTENSION: Primary | ICD-10-CM

## 2021-11-03 DIAGNOSIS — H91.93 BILATERAL HEARING LOSS, UNSPECIFIED HEARING LOSS TYPE: ICD-10-CM

## 2021-11-03 DIAGNOSIS — Z72.0 TOBACCO ABUSE: ICD-10-CM

## 2021-11-03 PROCEDURE — G8755 DIAS BP > OR = 90: HCPCS | Performed by: FAMILY MEDICINE

## 2021-11-03 PROCEDURE — 3017F COLORECTAL CA SCREEN DOC REV: CPT | Performed by: FAMILY MEDICINE

## 2021-11-03 PROCEDURE — G8753 SYS BP > OR = 140: HCPCS | Performed by: FAMILY MEDICINE

## 2021-11-03 PROCEDURE — G9717 DOC PT DX DEP/BP F/U NT REQ: HCPCS | Performed by: FAMILY MEDICINE

## 2021-11-03 PROCEDURE — G0439 PPPS, SUBSEQ VISIT: HCPCS | Performed by: FAMILY MEDICINE

## 2021-11-03 PROCEDURE — G8427 DOCREV CUR MEDS BY ELIG CLIN: HCPCS | Performed by: FAMILY MEDICINE

## 2021-11-03 PROCEDURE — G8417 CALC BMI ABV UP PARAM F/U: HCPCS | Performed by: FAMILY MEDICINE

## 2021-11-03 PROCEDURE — 99214 OFFICE O/P EST MOD 30 MIN: CPT | Performed by: FAMILY MEDICINE

## 2021-11-03 NOTE — PATIENT INSTRUCTIONS
Medicare Wellness Visit, Male The best way to live healthy is to have a lifestyle where you eat a well-balanced diet, exercise regularly, limit alcohol use, and quit all forms of tobacco/nicotine, if applicable. Regular preventive services are another way to keep healthy. Preventive services (vaccines, screening tests, monitoring & exams) can help personalize your care plan, which helps you manage your own care. Screening tests can find health problems at the earliest stages, when they are easiest to treat. [facility] follows the current, evidence-based guidelines published by the [facility] Tayo Leslie ([facility]) when recommending preventive services for our patients. Because we follow these guidelines, sometimes recommendations change over time as research supports it. (For example, a prostate screening blood test is no longer routinely recommended for men with no symptoms). Of course, you and your doctor may decide to screen more often for some diseases, based on your risk and co-morbidities (chronic disease you are already diagnosed with). Preventive services for you include: - Medicare offers their members a free annual wellness visit, which is time for you and your primary care provider to discuss and plan for your preventive service needs. Take advantage of this benefit every year! 
-All adults over age 72 should receive the recommended pneumonia vaccines. Current USPSTF guidelines recommend a series of two vaccines for the best pneumonia protection.  
-All adults should have a flu vaccine yearly and tetanus vaccine every 10 years. 
-All adults age 48 and older should receive the shingles vaccines (series of two vaccines).       
-All adults age 38-68 who are overweight should have a diabetes screening test once every three years.  
-Other screening tests & preventive services for persons with diabetes include: an eye exam to screen for diabetic retinopathy, a kidney function test, a foot exam, and stricter control over your cholesterol.  
-Cardiovascular screening for adults with routine risk involves an electrocardiogram (ECG) at intervals determined by the provider.  
-Colorectal cancer screening should be done for adults age 54-65 with no increased risk factors for colorectal cancer. There are a number of acceptable methods of screening for this type of cancer. Each test has its own benefits and drawbacks. Discuss with your provider what is most appropriate for you during your annual wellness visit. The different tests include: colonoscopy (considered the best screening method), a fecal occult blood test, a fecal DNA test, and sigmoidoscopy. 
-All adults born between [facility] should be screened once for Hepatitis C. 
-An Abdominal Aortic Aneurysm (AAA) Screening is recommended for men age 73-68 who has ever smoked in their lifetime. Here is a list of your current Health Maintenance items (your personalized list of preventive services) with a due date: 
Health Maintenance Due Topic Date Due  
 Colorectal Screening  12/10/2015 [address] [place] [address] Annual Well Visit  10/24/2021

## 2021-11-03 NOTE — PROGRESS NOTES
1. Have you been to the ER, urgent care clinic since your last visit? Hospitalized since your last visit? No    2. Have you seen or consulted any other health care providers outside of the [address] [place] [address] since your last visit? Include any pap smears or colon screening. No    Reviewed record in preparation for visit and have necessary documentation  Pt did not bring medication to office visit for review  Patient is accompanied by self I have received verbal consent from Estela Vo. to discuss any/all medical information while they are present in the room.     Goals that were addressed and/or need to be completed during or after this appointment include     Health Maintenance Due   Topic Date Due    Colorectal Cancer Screening Combo  12/10/2015    Medicare Yearly Exam  10/24/2021

## 2021-11-03 NOTE — PROGRESS NOTES
Progress Note    Patient: Dmitriy Cabello. MRN: [id number]  SSN: [id number]    YOB: 1955  Age: 61 y.o. Sex: male        Subjective:     Chief Complaint   Patient presents with    Follow-up    Annual Wellness Visit       HPI: he is a 61y.o. year old male who presents for follow up of his chronic health conditions. Patient with hx of skull fracture with resultant depression, neurogenic pain and hearing loss. He says his mood is stable with current dose of fluoxetine. Patient denies HA, CP, dysuria, acute weakness or paresthesia. He continues to smoke. Hypertension:  The patient reports he is taking medications as instructed, no medication side effects noted, no TIA's, no chest pain on exertion, notes new dyspnea on exertion, no swelling of ankles. BP Readings from Last 3 Encounters:   11/03/21 (!) 148/93   09/29/21 (!) 161/101   03/29/21 (!) 160/98     Lab Results   Component Value Date/Time    Sodium 140 09/29/2021 12:43 PM    Potassium 5.1 09/29/2021 12:43 PM    Chloride 105 09/29/2021 12:43 PM    CO2 30 09/29/2021 12:43 PM    Anion gap 5 09/29/2021 12:43 PM    Glucose 95 09/29/2021 12:43 PM    BUN 15 09/29/2021 12:43 PM    Creatinine 1.00 09/29/2021 12:43 PM    BUN/Creatinine ratio 15 09/29/2021 12:43 PM    GFR est AA >60 09/29/2021 12:43 PM    GFR est non-AA >60 09/29/2021 12:43 PM    Calcium 9.4 09/29/2021 12:43 PM    Bilirubin, total 0.3 09/29/2021 12:43 PM    ALT (SGPT) 28 09/29/2021 12:43 PM    Alk.  phosphatase 130 (H) 09/29/2021 12:43 PM    Protein, total 7.3 09/29/2021 12:43 PM    Albumin 4.0 09/29/2021 12:43 PM    Globulin 3.3 09/29/2021 12:43 PM    A-G Ratio 1.2 09/29/2021 12:43 PM      Lab Results   Component Value Date/Time    Cholesterol, total 210 (H) 09/29/2021 12:43 PM    HDL Cholesterol 57 09/29/2021 12:43 PM    LDL, calculated 118.8 (H) 09/29/2021 12:43 PM    Triglyceride 171 (H) 09/29/2021 12:43 PM    CHOL/HDL Ratio 3.7 09/29/2021 12:43 PM     Lab Results   Component Value Date/Time    TSH 1.20 09/29/2021 12:43 PM        Patient advised to log blood pressures at home weekly and bring to next visit. Call office as soon as possible if BP's over 140/90 on multiple occasions or with symptoms of dizziness, chest pain, shortness of breath, headache or ankle swelling. Our goal is to normalize the blood pressure to decrease the risks of strokes and heart attacks. The patient is in agreement with the plan. Current and past medical information:    Current Medications after this visit[de-identified]     Current Outpatient Medications   Medication Sig    multivit-mins/iron/folic/lycop (CENTRUM MEN PO) Take 1 Tablet by mouth daily.  omeprazole (PRILOSEC) 40 mg capsule Take one capsule by mouth every day  Indications: gastroesophageal reflux disease    pravastatin (PRAVACHOL) 20 mg tablet Take 1 Tablet by mouth nightly. Indications: high cholesterol    metoprolol tartrate (LOPRESSOR) 25 mg tablet Take 0.5 Tablets by mouth daily. Indications: high blood pressure    FLUoxetine (PROzac) 40 mg capsule Take 1 Capsule by mouth daily. Indications: anxiousness associated with depression    losartan (COZAAR) 100 mg tablet Take 1 Tablet by mouth daily. Indications: high blood pressure    traZODone (DESYREL) 50 mg tablet Take 1 Tablet by mouth nightly. Indications: insomnia    carBAMazepine (TEGretol) 100 mg chewable tablet CHEW AND SWALLOW 1 TABLET BY MOUTH 3 TIMES DAILY    levocetirizine (XYZAL) 5 mg tablet Take 1 Tab by mouth daily.  fluticasone propionate (FLONASE) 50 mcg/actuation nasal spray 2 Sprays by Both Nostrils route daily.  diphenhydrAMINE (BENADRYL ALLERGY) 25 mg tablet Take 1 Tab by mouth every six (6) hours as needed (Allergies).  aspirin delayed-release 81 mg tablet Take  by mouth daily. No current facility-administered medications for this visit.        Patient Active Problem List    Diagnosis Date Noted    Non-compliance 06/18/2019    Simple chronic bronchitis ([address] 06/18/2019    Chronic rhinitis 06/18/2019    Recurrent depression ([facility] 02/26/2018    Skull fracture ([facility] 06/11/2014    Depression due to head injury 06/11/2014    Allergic arthritis involving hand 06/11/2014    Neurogenic pain 04/04/2014    Hearing loss of both ears 09/23/2013    Hyperlipidemia 02/19/2013    Back pain 07/19/2012    Chronic SI joint pain 07/19/2012    Chest pain, unspecified 04/17/2012    Hand pain 02/27/2012    Tobacco abuse 02/27/2012    HTN (hypertension) 02/27/2012    Tinnitus 02/27/2012       Past Medical History:   Diagnosis Date    Chronic bronchitis ([facility]     H/O seasonal allergies     Hypertension        No Known Allergies    History reviewed. No pertinent surgical history. Social History     Socioeconomic History    Marital status: SINGLE   Tobacco Use    Smoking status: Current Every Day Smoker     Packs/day: 1.00     Types: Cigars     Last attempt to quit: 10/6/2018     Years since quitting: 3.0    Smokeless tobacco: Never Used   Substance and Sexual Activity    Alcohol use: No    Drug use: No         Objective:     Review of Systems:  Constitutional: Negative for fatigue or malaise  Derm: Negative for rash or lesion  HEENT: Negative for acute hearing or vision changes  Cardiovascular: Negative for dizziness, chest pain or palpitations  Respiratory: Negative for productive cough and wheezing  Gastrointestinal: Negative for nausea or abdominal pain  Musculoskeletal: see HPI  Neurological: see HPI, Negative for weakness or paresthesia  Psychological: see HPI      Vitals:    11/03/21 0954 11/03/21 1012   BP: (!) 172/99 (!) 148/93   Pulse: 69    Resp: 14    Temp: 99.3 °F (37.4 °C)    TempSrc: Oral    SpO2: 97%    Weight: 227 lb 9.6 oz (103.2 kg)    Height: 5' 8\" (1.727 m)       Body mass index is 34.61 kg/m².     Physical Exam:  Constitutional: well developed, well nourished, in no acute distress  Skin: warm and dry, normal tone and turgor  Head: normocephalic, atraumatic  Eyes: sclera clear, EOMI  Neck: normal range of motion  CV: normal S1, S2, regular rate and rhythm  Respiratory: clear with symmetrical effort  Extremities: full range of motion, antalgic gait  Neurology: normal strength and sensation  Psych: active, alert and oriented, affect appropriate       Assessment and orders:     Diagnoses and all orders for this visit:    1. Essential hypertension    2. Chronic obstructive pulmonary disease, unspecified COPD type ([facility]    3. Convulsions, unspecified convulsion type ([facility]    4. Tobacco abuse            Plan of care:  Diagnoses were discussed in detail with patient. Strongly advised patient to stop all use of tobacco products. Medications reviewed and appropriate. Patient to continue current prescribed medications as written. Medication risks/benefits/side effects discussed with patient. All of the patient's questions were addressed and answered to apparent satisfaction. The patient understands and agrees with our plan of care. The patient knows to call back if they have questions about the plan of care or if symptoms change. The patient received an After-Visit Summary which contains VS, diagnoses, orders, allergy and medication lists. Future Appointments   Date Time Provider Vanessa Gutierrez   5/3/2022 11:00 AM Alhaji Valdez MD [facility] BS AMB       Patient Care Team:  Alhaji Valdez MD as PCP - General (Family Medicine)  Alahji Valdez MD as PCP - [address] [place] [address] Provider  Daniel Penaloza MD (Rheumatology)  Walt Perez MD (Otolaryngology)  Kajal Holder MD (Cardiology)  Renan Wise MD (Neurology)    Follow-up and Dispositions    · Return in about 6 months (around 5/3/2022).          Future Appointments   Date Time Provider Vanessa Gutierrez   5/3/2022 11:00 AM Alhaji Valdez MD [facility] BS AMB       Signed By: Thao Arora MD     November 7, 2021            This is the Subsequent Medicare Annual Wellness Exam, performed 12 months or more after the Initial AWV or the last Subsequent AWV    I have reviewed the patient's medical history in detail and updated the computerized patient record. Assessment/Plan   Education and counseling provided:  Are appropriate based on today's review and evaluation  End-of-Life planning (with patient's consent)    1. Medicare annual wellness visit, subsequent  -     REFERRAL TO AUDIOLOGY         Depression Risk Factor Screening     3 most recent PHQ Screens 11/3/2021   Little interest or pleasure in doing things Not at all   Feeling down, depressed, irritable, or hopeless Not at all   Total Score PHQ 2 0       Alcohol Risk Screen    Do you average more than 2 drinks per night or 14 drinks a week: No    On any one occasion in the past three months have you have had more than 4 drinks containing alcohol:  No        Functional Ability and Level of Safety    Hearing: The patient wears hearing aids. Activities of Daily Living: The home contains: no safety equipment. Patient does total self care      Ambulation: with mild difficulty     Fall Risk:  Fall Risk Assessment, last 12 mths 11/3/2021   Able to walk? Yes   Fall in past 12 months? 1   Do you feel unsteady? 0   Are you worried about falling 0   Is the gait abnormal? 0   Number of falls in past 12 months 1   Fall with injury?  1      Abuse Screen:  Patient is not abused       Cognitive Screening    Has your family/caregiver stated any concerns about your memory: no       Health Maintenance Due     Health Maintenance Due   Topic Date Due    Colorectal Cancer Screening Combo  12/10/2015    Medicare Yearly Exam  10/24/2021       Patient Care Team   Patient Care Team:  Michael Perez MD as PCP - General (Family Medicine)  Michael Perez MD as PCP - REHABILITATION [facility] Empaneled Provider  Cele Foley MD (Rheumatology)  Luis Angel Trujillo MD (Otolaryngology)  Mitchell Zheng MD (Cardiology)  Marti Painting MD (Neurology)    History     Patient Active Problem List   Diagnosis Code    Hand pain M79.643    Tobacco abuse Z72.0    HTN (hypertension) I10    Tinnitus H93.19    Chest pain, unspecified R07.9    Back pain M54.9    Chronic SI joint pain M53.3, G89.29    Hyperlipidemia E78.5    Hearing loss of both ears H91.93    Neurogenic pain M79.2    Skull fracture ([facility]) S02. 91XA    Depression due to head injury F32. A, S09.90XA    Allergic arthritis involving hand M13.849    Recurrent depression ([facility]) F33.9    Non-compliance Z91.19    Simple chronic bronchitis ([facility]) J41.0    Chronic rhinitis J31.0     Past Medical History:   Diagnosis Date    Chronic bronchitis ([facility]     H/O seasonal allergies     Hypertension       History reviewed. No pertinent surgical history. Current Outpatient Medications   Medication Sig Dispense Refill    multivit-mins/iron/folic/lycop (CENTRUM MEN PO) Take 1 Tablet by mouth daily.  omeprazole (PRILOSEC) 40 mg capsule Take one capsule by mouth every day  Indications: gastroesophageal reflux disease 90 Capsule 1    pravastatin (PRAVACHOL) 20 mg tablet Take 1 Tablet by mouth nightly. Indications: high cholesterol 90 Tablet 1    metoprolol tartrate (LOPRESSOR) 25 mg tablet Take 0.5 Tablets by mouth daily. Indications: high blood pressure 45 Tablet 2    FLUoxetine (PROzac) 40 mg capsule Take 1 Capsule by mouth daily. Indications: anxiousness associated with depression 90 Capsule 1    losartan (COZAAR) 100 mg tablet Take 1 Tablet by mouth daily. Indications: high blood pressure 90 Tablet 1    traZODone (DESYREL) 50 mg tablet Take 1 Tablet by mouth nightly. Indications: insomnia 90 Tablet 0    carBAMazepine (TEGretol) 100 mg chewable tablet CHEW AND SWALLOW 1 TABLET BY MOUTH 3 TIMES DAILY 90 Tablet 2    levocetirizine (XYZAL) 5 mg tablet Take 1 Tab by mouth daily. 30 Tab 2    fluticasone propionate (FLONASE) 50 mcg/actuation nasal spray 2 Sprays by Both Nostrils route daily.  1 Bottle 2    diphenhydrAMINE (BENADRYL ALLERGY) 25 mg tablet Take 1 Tab by mouth every six (6) hours as needed (Allergies). 30 Tab 1    aspirin delayed-release 81 mg tablet Take  by mouth daily.        No Known Allergies    Family History   Family history unknown: Yes     Social History     Tobacco Use    Smoking status: Current Every Day Smoker     Packs/day: 1.00     Types: Cigars     Last attempt to quit: 10/6/2018     Years since quitting: 3.0    Smokeless tobacco: Never Used   Substance Use Topics    Alcohol use: No         Lalo Machado MD

## 2021-12-10 DIAGNOSIS — R56.9 CONVULSIONS, UNSPECIFIED CONVULSION TYPE (HCC): ICD-10-CM

## 2021-12-10 RX ORDER — CARBAMAZEPINE 100 MG/1
TABLET, CHEWABLE ORAL
Qty: 90 TABLET | Refills: 2 | Status: CANCELLED | OUTPATIENT
Start: 2021-12-10

## 2021-12-10 NOTE — TELEPHONE ENCOUNTER
Attempted to call. No answer. Message left. Advise patient he has 2 remaining refills of Carbamazepine at pharmacy and to call [facility]

## 2021-12-17 DIAGNOSIS — S09.90XA DEPRESSION DUE TO HEAD INJURY: ICD-10-CM

## 2021-12-17 DIAGNOSIS — E78.5 HYPERLIPIDEMIA, UNSPECIFIED HYPERLIPIDEMIA TYPE: ICD-10-CM

## 2021-12-17 DIAGNOSIS — F32.A DEPRESSION DUE TO HEAD INJURY: ICD-10-CM

## 2021-12-17 RX ORDER — PRAVASTATIN SODIUM 20 MG/1
20 TABLET ORAL
Qty: 90 TABLET | Refills: 1 | Status: CANCELLED | OUTPATIENT
Start: 2021-12-17

## 2021-12-17 RX ORDER — FLUOXETINE HYDROCHLORIDE 40 MG/1
40 CAPSULE ORAL DAILY
Qty: 90 CAPSULE | Refills: 1 | Status: CANCELLED | OUTPATIENT
Start: 2021-12-17

## 2021-12-17 NOTE — TELEPHONE ENCOUNTER
Called patient. He was advised he has remaining refills at pharmacy and to contact [facility]. Verbalized understanding.

## 2022-01-06 DIAGNOSIS — I10 ESSENTIAL HYPERTENSION: ICD-10-CM

## 2022-01-06 RX ORDER — METOPROLOL TARTRATE 25 MG/1
12.5 TABLET, FILM COATED ORAL DAILY
Qty: 45 TABLET | Refills: 1 | Status: SHIPPED | OUTPATIENT
Start: 2022-01-06 | End: 2022-04-08 | Stop reason: SDUPTHER

## 2022-02-12 DIAGNOSIS — R56.9 CONVULSIONS, UNSPECIFIED CONVULSION TYPE (HCC): ICD-10-CM

## 2022-02-13 RX ORDER — CARBAMAZEPINE 100 MG/1
TABLET, CHEWABLE ORAL
Qty: 90 TABLET | Refills: 2 | Status: SHIPPED | OUTPATIENT
Start: 2022-02-13 | End: 2022-05-15

## 2022-03-14 ENCOUNTER — TELEPHONE (OUTPATIENT)
Dept: FAMILY MEDICINE CLINIC | Age: 67
End: 2022-03-14

## 2022-03-14 DIAGNOSIS — R56.9 CONVULSIONS, UNSPECIFIED CONVULSION TYPE (HCC): ICD-10-CM

## 2022-03-14 RX ORDER — CARBAMAZEPINE 100 MG/1
TABLET, CHEWABLE ORAL
Qty: 90 TABLET | Refills: 2 | Status: CANCELLED | OUTPATIENT
Start: 2022-03-14

## 2022-03-18 PROBLEM — J31.0 CHRONIC RHINITIS: Status: ACTIVE | Noted: 2019-06-18

## 2022-03-18 PROBLEM — Z91.199 NON-COMPLIANCE: Status: ACTIVE | Noted: 2019-06-18

## 2022-03-19 PROBLEM — F33.9 RECURRENT DEPRESSION (HCC): Status: ACTIVE | Noted: 2018-02-26

## 2022-03-19 PROBLEM — J41.0 SIMPLE CHRONIC BRONCHITIS (HCC): Status: ACTIVE | Noted: 2019-06-18

## 2022-04-07 ENCOUNTER — TELEPHONE (OUTPATIENT)
Dept: FAMILY MEDICINE CLINIC | Age: 67
End: 2022-04-07

## 2022-04-08 ENCOUNTER — TELEPHONE (OUTPATIENT)
Dept: FAMILY MEDICINE CLINIC | Age: 67
End: 2022-04-08

## 2022-04-08 ENCOUNTER — OFFICE VISIT (OUTPATIENT)
Dept: FAMILY MEDICINE CLINIC | Age: 67
End: 2022-04-08
Payer: MEDICARE

## 2022-04-08 VITALS
HEART RATE: 80 BPM | DIASTOLIC BLOOD PRESSURE: 106 MMHG | HEIGHT: 68 IN | WEIGHT: 230 LBS | TEMPERATURE: 97.3 F | OXYGEN SATURATION: 97 % | RESPIRATION RATE: 16 BRPM | BODY MASS INDEX: 34.86 KG/M2 | SYSTOLIC BLOOD PRESSURE: 175 MMHG

## 2022-04-08 DIAGNOSIS — S09.90XA DEPRESSION DUE TO HEAD INJURY: ICD-10-CM

## 2022-04-08 DIAGNOSIS — F33.9 RECURRENT DEPRESSION (HCC): ICD-10-CM

## 2022-04-08 DIAGNOSIS — K21.9 GASTROESOPHAGEAL REFLUX DISEASE, UNSPECIFIED WHETHER ESOPHAGITIS PRESENT: ICD-10-CM

## 2022-04-08 DIAGNOSIS — I10 ESSENTIAL HYPERTENSION: ICD-10-CM

## 2022-04-08 DIAGNOSIS — G89.29 CHRONIC NONINTRACTABLE HEADACHE, UNSPECIFIED HEADACHE TYPE: Primary | ICD-10-CM

## 2022-04-08 DIAGNOSIS — R56.9 CONVULSIONS, UNSPECIFIED CONVULSION TYPE (HCC): ICD-10-CM

## 2022-04-08 DIAGNOSIS — Z91.09 ENVIRONMENTAL ALLERGIES: ICD-10-CM

## 2022-04-08 DIAGNOSIS — R51.9 CHRONIC NONINTRACTABLE HEADACHE, UNSPECIFIED HEADACHE TYPE: Primary | ICD-10-CM

## 2022-04-08 DIAGNOSIS — F32.A DEPRESSION DUE TO HEAD INJURY: ICD-10-CM

## 2022-04-08 DIAGNOSIS — Z12.11 COLON CANCER SCREENING: ICD-10-CM

## 2022-04-08 DIAGNOSIS — M79.2 NEUROGENIC PAIN: ICD-10-CM

## 2022-04-08 PROCEDURE — 99214 OFFICE O/P EST MOD 30 MIN: CPT | Performed by: FAMILY MEDICINE

## 2022-04-08 PROCEDURE — G8427 DOCREV CUR MEDS BY ELIG CLIN: HCPCS | Performed by: FAMILY MEDICINE

## 2022-04-08 PROCEDURE — 3017F COLORECTAL CA SCREEN DOC REV: CPT | Performed by: FAMILY MEDICINE

## 2022-04-08 PROCEDURE — G8417 CALC BMI ABV UP PARAM F/U: HCPCS | Performed by: FAMILY MEDICINE

## 2022-04-08 PROCEDURE — G8753 SYS BP > OR = 140: HCPCS | Performed by: FAMILY MEDICINE

## 2022-04-08 PROCEDURE — G8755 DIAS BP > OR = 90: HCPCS | Performed by: FAMILY MEDICINE

## 2022-04-08 PROCEDURE — G9717 DOC PT DX DEP/BP F/U NT REQ: HCPCS | Performed by: FAMILY MEDICINE

## 2022-04-08 RX ORDER — METOPROLOL TARTRATE 25 MG/1
25 TABLET, FILM COATED ORAL DAILY
Qty: 90 TABLET | Refills: 1 | Status: SHIPPED | OUTPATIENT
Start: 2022-04-08 | End: 2022-11-02 | Stop reason: SDUPTHER

## 2022-04-08 RX ORDER — LEVOCETIRIZINE DIHYDROCHLORIDE 5 MG/1
5 TABLET, FILM COATED ORAL DAILY
Qty: 30 TABLET | Refills: 2 | Status: SHIPPED | OUTPATIENT
Start: 2022-04-08 | End: 2022-07-13

## 2022-04-08 RX ORDER — FLUTICASONE PROPIONATE 50 MCG
2 SPRAY, SUSPENSION (ML) NASAL DAILY
Qty: 16 G | Refills: 2 | Status: SHIPPED | OUTPATIENT
Start: 2022-04-08

## 2022-04-08 NOTE — PROGRESS NOTES
1. Have you been to the ER, urgent care clinic since your last visit? Hospitalized since your last visit? No    2. Have you seen or consulted any other health care providers outside of the [address] [place] [address] since your last visit? Include any pap smears or colon screening. No    3. For patients aged 39-70: Has the patient had a colonoscopy / FIT/ Cologuard? No    If the patient is female:    4. For patients aged 41-77: Has the patient had a mammogram within the past 2 years? NA - based on age or sex      11. For patients aged 21-65: Has the patient had a pap smear? NA - based on age or sex     Reviewed record in preparation for visit and have necessary documentation  Pt did not bring medication to office visit for review  Patient is accompanied by self I have received verbal consent from Angeline Wan to discuss any/all medical information while they are present in the room.     Goals that were addressed and/or need to be completed during or after this appointment include     Health Maintenance Due   Topic Date Due    Colorectal Cancer Screening Combo  12/10/2015

## 2022-04-08 NOTE — PROGRESS NOTES
[facility]    History of Present Illness:   Lord Jurado is a 61 y.o. male with history of HTN, COPD, Skull Fracture, TBI, HLD, Depression, Neurogenic Pain, Hearing loss of both ears. CC: Multiple complaints  History provided by patient and Records    HPI:  Patient reports that He has had a chronic issue with abdominal bloating and a sensation of gas sensation requiring belching to improve. Patient does take Omeprazole as well. Has never had a EGD, and no colonoscopy per patient. Patient reports a report history of of lifting a person and not sure if related, but unclear when this was. Patient has also noted that he has been having sinus congestion, neck pains, and headaches. Feels they are different from the norm, but unclear how long going on, possibly over a year, though was seen more recently. Patient notes he has been concerned about his behavior as well, feeling like his mood is switching frequently, more irritable at times, and reports that family members are sometimes scared of him. Patient is Currrently on Prozac, Tegretol, Trazodone. Health Maintenance  Health Maintenance Due   Topic Date Due    Colorectal Cancer Screening Combo  12/10/2015       Past Medical, Family, and Social History:     Current Outpatient Medications on File Prior to Visit   Medication Sig Dispense Refill    FLUoxetine (PROzac) 40 mg capsule TAKE ONE CAPSULE BY MOUTH EVERY DAY 90 Capsule 0    pravastatin (PRAVACHOL) 10 mg tablet TAKE ONE TABLET BY MOUTH NIGHTLY 90 Tablet 0    carBAMazepine (TEGretol) 100 mg chewable tablet CHEW AND SWALLOW 1 TABLET BY MOUTH 3 TIMES DAILY 90 Tablet 2    omeprazole (PRILOSEC) 40 mg capsule TAKE ONE CAPSULE BY MOUTH EVERY DAY 90 Capsule 1    losartan (COZAAR) 100 mg tablet TAKE ONE TABLET BY MOUTH EVERY DAY 90 Tablet 0    multivit-mins/iron/folic/lycop (CENTRUM MEN PO) Take 1 Tablet by mouth daily.       diphenhydrAMINE (BENADRYL ALLERGY) 25 mg tablet Take 1 Tab by mouth every six (6) hours as needed (Allergies). 30 Tab 1    aspirin delayed-release 81 mg tablet Take  by mouth daily.  [DISCONTINUED] metoprolol tartrate (LOPRESSOR) 25 mg tablet Take 0.5 Tablets by mouth daily. Indications: high blood pressure 45 Tablet 1    pravastatin (PRAVACHOL) 20 mg tablet Take 1 Tablet by mouth nightly. Indications: high cholesterol (Patient not taking: Reported on 4/8/2022) 90 Tablet 1    traZODone (DESYREL) 50 mg tablet Take 1 Tablet by mouth nightly. Indications: insomnia (Patient not taking: Reported on 4/8/2022) 90 Tablet 0    [DISCONTINUED] levocetirizine (XYZAL) 5 mg tablet Take 1 Tab by mouth daily. 30 Tab 2    [DISCONTINUED] fluticasone propionate (FLONASE) 50 mcg/actuation nasal spray 2 Sprays by Both Nostrils route daily. 1 Bottle 2     No current facility-administered medications on file prior to visit. Patient Active Problem List   Diagnosis Code    Hand pain M79.643    Tobacco abuse Z72.0    HTN (hypertension) I10    Tinnitus H93.19    Chest pain, unspecified R07.9    Back pain M54.9    Chronic SI joint pain M53.3, G89.29    Hyperlipidemia E78.5    Hearing loss of both ears H91.93    Neurogenic pain M79.2    Skull fracture (HCC) S02. 91XA    Depression due to head injury F32. A, S09.90XA    Allergic arthritis involving hand M13.849    Recurrent depression (HCC) F33.9    Non-compliance Z91.19    Simple chronic bronchitis (HCC) J41.0    Chronic rhinitis J31.0       Social History     Socioeconomic History    Marital status: SINGLE   Tobacco Use    Smoking status: Current Every Day Smoker     Packs/day: 1.00     Types: Cigars     Last attempt to quit: 10/6/2018     Years since quitting: 3.5    Smokeless tobacco: Never Used   Substance and Sexual Activity    Alcohol use: No    Drug use: No        Review of Systems   Review of Systems   Constitutional: Negative for chills and fever.    Cardiovascular: Negative for chest pain and palpitations. Gastrointestinal: Negative for nausea and vomiting. Neurological: Negative for dizziness and headaches. Objective:     Visit Vitals  BP (!) 175/106   Pulse 80   Temp 97.3 °F (36.3 °C) (Oral)   Resp 16   Ht 5' 8\" (1.727 m)   Wt 230 lb (104.3 kg)   SpO2 97%   BMI 34.97 kg/m²        Physical Exam  Vitals and nursing note reviewed. Constitutional:       Appearance: Normal appearance. HENT:      Head: Normocephalic and atraumatic. Cardiovascular:      Rate and Rhythm: Normal rate and regular rhythm. Pulses: Normal pulses. Heart sounds: Normal heart sounds. Pulmonary:      Effort: Pulmonary effort is normal.      Breath sounds: Normal breath sounds. Abdominal:      General: Abdomen is flat. Bowel sounds are normal.      Palpations: Abdomen is soft. Musculoskeletal:         General: Normal range of motion. Cervical back: Normal range of motion and neck supple. Skin:     General: Skin is warm and dry. Neurological:      Mental Status: He is alert. Pertinent Labs/Studies:      Assessment and orders:       ICD-10-CM ICD-9-CM    1. Chronic nonintractable headache, unspecified headache type  R51.9 784.0 REFERRAL TO NEUROLOGY    G89.29     2. Depression due to head injury  F32. A 311 REFERRAL TO NEUROLOGY    S09.90XA 959.01    3. Recurrent depression (HCC)  F33.9 296.30    4. Convulsions, unspecified convulsion type ([facility]  R56.9 780.39 REFERRAL TO NEUROLOGY   5. Neurogenic pain  M79.2 729.2 REFERRAL TO NEUROLOGY   6. Gastroesophageal reflux disease, unspecified whether esophagitis present  K21.9 530.81 REFERRAL TO GASTROENTEROLOGY   7. Colon cancer screening  Z12.11 V76.51 REFERRAL TO GASTROENTEROLOGY   8. Environmental allergies  Z91.09 V15.09 fluticasone propionate (FLONASE) 50 mcg/actuation nasal spray      levocetirizine (XYZAL) 5 mg tablet   9.  Essential hypertension  I10 401.9 metoprolol tartrate (LOPRESSOR) 25 mg tablet     Diagnoses and all orders for this visit: 1. Chronic nonintractable headache, unspecified headache type/Depression due to head injury/Recurrent depression (HCC)/Convulsions, unspecified convulsion type [facility]): Follow up in neurology given possible changes, though history is not clear.  -     REFERRAL TO NEUROLOGY    5. Neurogenic pain  -     REFERRAL TO NEUROLOGY    6. Gastroesophageal reflux disease, unspecified whether esophagitis present  -     REFERRAL TO GASTROENTEROLOGY    7. Colon cancer screening  -     REFERRAL TO GASTROENTEROLOGY    8. Environmental allergies: Possible causing worsening headaches. Will trial allergy treatment. -     fluticasone propionate (FLONASE) 50 mcg/actuation nasal spray; 2 Sprays by Both Nostrils route daily. -     levocetirizine (XYZAL) 5 mg tablet; Take 1 Tablet by mouth daily. HTN: Increasing Metoprolol to 25 mg daily. Follow-up and Dispositions    · Return in about 25 days (around 5/3/2022) for Follow up as previously scheduled. .  Follow-up and Disposition History           I have discussed the diagnosis with the patient and the intended plan as seen in the above orders. Social history, medical history, and labs were reviewed. The patient has received an after-visit summary and questions were answered concerning future plans. I have discussed medication side effects and warnings with the patient as well.     MD ANIA Hadley & ABEL FITCH [facility]  04/08/22

## 2022-04-12 RX ORDER — LOSARTAN POTASSIUM 100 MG/1
100 TABLET ORAL DAILY
Qty: 90 TABLET | Refills: 0 | Status: SHIPPED | OUTPATIENT
Start: 2022-04-12 | End: 2022-11-02 | Stop reason: SDUPTHER

## 2022-04-12 NOTE — TELEPHONE ENCOUNTER
Called patient to reschedule appt to Dr Jennyfer Galloway. Patient stated he wants to keep appt with Dr Mio Segura.

## 2022-04-13 DIAGNOSIS — K21.9 GASTROESOPHAGEAL REFLUX DISEASE WITHOUT ESOPHAGITIS: ICD-10-CM

## 2022-04-13 RX ORDER — OMEPRAZOLE 40 MG/1
CAPSULE, DELAYED RELEASE ORAL
Qty: 90 CAPSULE | Refills: 1 | Status: CANCELLED | OUTPATIENT
Start: 2022-04-13

## 2022-04-13 NOTE — TELEPHONE ENCOUNTER
Called patient. He was advised to contact pharmacy for remaining refills of Omeprazole. Verbalized understanding.

## 2022-04-13 NOTE — TELEPHONE ENCOUNTER
This kind of situation was discussed at our provider meeting. Cancel the appointment with Dr. Juan Palencia and I will have Deandre Paez or [place] call the patient.

## 2022-05-03 ENCOUNTER — OFFICE VISIT (OUTPATIENT)
Dept: FAMILY MEDICINE CLINIC | Age: 67
End: 2022-05-03
Payer: MEDICARE

## 2022-05-03 VITALS
WEIGHT: 228.4 LBS | TEMPERATURE: 99.4 F | BODY MASS INDEX: 34.62 KG/M2 | HEART RATE: 62 BPM | HEIGHT: 68 IN | DIASTOLIC BLOOD PRESSURE: 87 MMHG | SYSTOLIC BLOOD PRESSURE: 154 MMHG | OXYGEN SATURATION: 94 % | RESPIRATION RATE: 16 BRPM

## 2022-05-03 DIAGNOSIS — J44.9 CHRONIC OBSTRUCTIVE PULMONARY DISEASE, UNSPECIFIED COPD TYPE (HCC): ICD-10-CM

## 2022-05-03 DIAGNOSIS — R56.9 CONVULSIONS, UNSPECIFIED CONVULSION TYPE (HCC): ICD-10-CM

## 2022-05-03 DIAGNOSIS — Z72.0 TOBACCO ABUSE: ICD-10-CM

## 2022-05-03 DIAGNOSIS — E78.5 HYPERLIPIDEMIA, UNSPECIFIED HYPERLIPIDEMIA TYPE: ICD-10-CM

## 2022-05-03 DIAGNOSIS — I10 ESSENTIAL HYPERTENSION: Primary | ICD-10-CM

## 2022-05-03 PROCEDURE — G8753 SYS BP > OR = 140: HCPCS | Performed by: FAMILY MEDICINE

## 2022-05-03 PROCEDURE — 3017F COLORECTAL CA SCREEN DOC REV: CPT | Performed by: FAMILY MEDICINE

## 2022-05-03 PROCEDURE — G8754 DIAS BP LESS 90: HCPCS | Performed by: FAMILY MEDICINE

## 2022-05-03 PROCEDURE — G8417 CALC BMI ABV UP PARAM F/U: HCPCS | Performed by: FAMILY MEDICINE

## 2022-05-03 PROCEDURE — G8427 DOCREV CUR MEDS BY ELIG CLIN: HCPCS | Performed by: FAMILY MEDICINE

## 2022-05-03 PROCEDURE — 99214 OFFICE O/P EST MOD 30 MIN: CPT | Performed by: FAMILY MEDICINE

## 2022-05-03 PROCEDURE — G9717 DOC PT DX DEP/BP F/U NT REQ: HCPCS | Performed by: FAMILY MEDICINE

## 2022-05-03 NOTE — PROGRESS NOTES
1. Have you been to the ER, urgent care clinic since your last visit? Hospitalized since your last visit? No    2. Have you seen or consulted any other health care providers outside of the [address] [place] [address] since your last visit? Include any pap smears or colon screening. No    3. For patients aged 39-70: Has the patient had a colonoscopy / FIT/ Cologuard? No    If the patient is female:    4. For patients aged 41-77: Has the patient had a mammogram within the past 2 years? NA - based on age or sex      11. For patients aged 21-65: Has the patient had a pap smear? NA - based on age or sex     Reviewed record in preparation for visit and have necessary documentation  Pt did not bring medication to office visit for review  Patient is accompanied by self I have received verbal consent from Michaela Aguirre. to discuss any/all medical information while they are present in the room.     Goals that were addressed and/or need to be completed during or after this appointment include     Health Maintenance Due   Topic Date Due    Colorectal Cancer Screening Combo  12/10/2015    Pneumococcal 0-64 years (2 - PCV) 10/26/2018

## 2022-05-04 NOTE — PROGRESS NOTES
Progress Note    Patient: Erin Euceda. MRN: [id number]  SSN: [id number]    YOB: 1955  Age: 61 y.o. Sex: male        Subjective:     Chief Complaint   Patient presents with    Follow-up     complaining of diarrhea, sweats, headache, forgetfullness - hx head injury        HPI: he is a 61y.o. year old male who presents for follow up of his chronic health conditions. Patient with hx of skull fracture with resultant depression, neurogenic pain and hearing loss. He says his mood is stable with current dose of fluoxetine. He reports stopping trazodone for insomnia. Patient denies HA, CP, dysuria, acute weakness or paresthesia. He continues to smoke. Hypertension:  The patient reports he is taking medications as instructed, no medication side effects noted, no TIA's, no chest pain on exertion, notes new dyspnea on exertion, no swelling of ankles. BP Readings from Last 3 Encounters:   05/03/22 (!) 154/87   04/08/22 (!) 175/106   11/03/21 (!) 148/93     Patient advised to log blood pressures at home weekly and bring to next visit. Call office as soon as possible if BP's over 140/90 on multiple occasions or with symptoms of dizziness, chest pain, shortness of breath, headache or ankle swelling. Our goal is to normalize the blood pressure to decrease the risks of strokes and heart attacks. The patient is in agreement with the plan. Current and past medical information:    Current Medications after this visit[de-identified]     Current Outpatient Medications   Medication Sig    losartan (COZAAR) 100 mg tablet Take 1 Tablet by mouth daily.  fluticasone propionate (FLONASE) 50 mcg/actuation nasal spray 2 Sprays by Both Nostrils route daily.  levocetirizine (XYZAL) 5 mg tablet Take 1 Tablet by mouth daily.  metoprolol tartrate (LOPRESSOR) 25 mg tablet Take 1 Tablet by mouth daily.  Indications: high blood pressure    FLUoxetine (PROzac) 40 mg capsule TAKE ONE CAPSULE BY MOUTH EVERY DAY    pravastatin (PRAVACHOL) 10 mg tablet TAKE ONE TABLET BY MOUTH NIGHTLY    carBAMazepine (TEGretol) 100 mg chewable tablet CHEW AND SWALLOW 1 TABLET BY MOUTH 3 TIMES DAILY    omeprazole (PRILOSEC) 40 mg capsule TAKE ONE CAPSULE BY MOUTH EVERY DAY    diphenhydrAMINE (BENADRYL ALLERGY) 25 mg tablet Take 1 Tab by mouth every six (6) hours as needed (Allergies).  aspirin delayed-release 81 mg tablet Take  by mouth daily.  multivit-mins/iron/folic/lycop (CENTRUM MEN PO) Take 1 Tablet by mouth daily. No current facility-administered medications for this visit. Patient Active Problem List    Diagnosis Date Noted    Non-compliance 06/18/2019    Simple chronic bronchitis ([facility] 06/18/2019    Chronic rhinitis 06/18/2019    Recurrent depression ([facility] 02/26/2018    Skull fracture ([facility] 06/11/2014    Depression due to head injury 06/11/2014    Allergic arthritis involving hand 06/11/2014    Neurogenic pain 04/04/2014    Hearing loss of both ears 09/23/2013    Hyperlipidemia 02/19/2013    Back pain 07/19/2012    Chronic SI joint pain 07/19/2012    Chest pain, unspecified 04/17/2012    Hand pain 02/27/2012    Tobacco abuse 02/27/2012    HTN (hypertension) 02/27/2012    Tinnitus 02/27/2012       Past Medical History:   Diagnosis Date    Chronic bronchitis ([facility]     H/O seasonal allergies     Hypertension        No Known Allergies    History reviewed. No pertinent surgical history.     Social History     Socioeconomic History    Marital status: SINGLE   Tobacco Use    Smoking status: Current Every Day Smoker     Packs/day: 1.00     Types: Cigars     Last attempt to quit: 10/6/2018     Years since quitting: 3.5    Smokeless tobacco: Never Used   Substance and Sexual Activity    Alcohol use: No    Drug use: No         Objective:     Review of Systems:  Constitutional: Negative for fatigue or malaise  Derm: Negative for rash or lesion  HEENT: Negative for acute hearing or vision changes  Cardiovascular: Negative for dizziness, chest pain or palpitations  Respiratory: Negative for productive cough and wheezing  Gastrointestinal: Negative for nausea or abdominal pain  Musculoskeletal: see HPI  Neurological: see HPI, Negative for weakness or paresthesia  Psychological: see HPI      Vitals:    05/03/22 1041 05/03/22 1048   BP: (!) 173/103 (!) 154/87   Pulse: 62    Resp: 16    Temp: 99.4 °F (37.4 °C)    TempSrc: Oral    SpO2: 94%    Weight: 228 lb 6.4 oz (103.6 kg)    Height: 5' 8\" (1.727 m)       Body mass index is 34.73 kg/m². Physical Exam:  Constitutional: well developed, well nourished, in no acute distress  Skin: warm and dry, normal tone and turgor  Head: normocephalic, atraumatic  Eyes: sclera clear, EOMI  Neck: normal range of motion  CV: normal S1, S2, regular rate and rhythm  Respiratory: clear with symmetrical effort  Extremities: full range of motion, antalgic gait  Neurology: normal strength and sensation  Psych: active, alert and oriented, affect appropriate       Assessment and orders:     Diagnoses and all orders for this visit:    1. Essential hypertension  -     LIPID PANEL; Future  -     METABOLIC PANEL, COMPREHENSIVE; Future  -     TSH 3RD GENERATION; Future    2. Hyperlipidemia, unspecified hyperlipidemia type  -     LIPID PANEL; Future  -     METABOLIC PANEL, COMPREHENSIVE; Future    3. Chronic obstructive pulmonary disease, unspecified COPD type ([facility]  -     HGB & HCT; Future    4. Convulsions, unspecified convulsion type ([facility]  -     CARBAMAZEPINE; Future    5. Tobacco abuse        Plan of care:  Diagnoses were discussed in detail with patient. Strongly advised patient to stop all use of tobacco products. Medication risks/benefits/side effects discussed with patient. All of the patient's questions were addressed and answered to apparent satisfaction. The patient understands and agrees with our plan of care.   The patient knows to call back if they have questions about the plan of care or if symptoms change. The patient received an After-Visit Summary which contains VS, diagnoses, orders, allergy and medication lists. Future Appointments   Date Time Provider Vanessa Gutierrez   6/20/2022  2:00 PM Marco Branch MD [facility] BS AMB   11/3/2022 10:00 AM Georgiana Herndon MD [facility]       Patient Care Team:  Georgiana Herndon MD as PCP - General (Family Medicine)  Georgiana Herndon MD as PCP - [facility] Provider  Dimitrios Isaac MD (Rheumatology)  Denver Barlow, MD (Otolaryngology)  Missael Gallardo MD (Cardiology)  Marco Branch MD (Neurology)    Follow-up and Dispositions    · Return in about 6 months (around 11/3/2022).          Future Appointments   Date Time Provider Vanessa Gutierrez   6/20/2022  2:00 PM Marco Branch  [facility] BS AMB   11/3/2022 10:00 AM Georgiana Herndon MD [facility]       Signed By: Devon Guido MD     May 3, 2022

## 2022-05-31 ENCOUNTER — TELEPHONE (OUTPATIENT)
Dept: FAMILY MEDICINE CLINIC | Age: 67
End: 2022-05-31

## 2022-05-31 NOTE — TELEPHONE ENCOUNTER
Pt received letter requesting referral update. He has been unable to schedule appt to see the [facility] specialist due to transportation reasons. He would like to sill keep the order in.

## 2022-06-20 ENCOUNTER — OFFICE VISIT (OUTPATIENT)
Dept: NEUROLOGY | Age: 67
End: 2022-06-20
Payer: MEDICARE

## 2022-06-20 VITALS
SYSTOLIC BLOOD PRESSURE: 140 MMHG | WEIGHT: 228.6 LBS | DIASTOLIC BLOOD PRESSURE: 80 MMHG | HEART RATE: 86 BPM | OXYGEN SATURATION: 96 % | HEIGHT: 68 IN | BODY MASS INDEX: 34.65 KG/M2

## 2022-06-20 DIAGNOSIS — S06.9X9S TRAUMATIC BRAIN INJURY WITH LOSS OF CONSCIOUSNESS, SEQUELA (HCC): Primary | ICD-10-CM

## 2022-06-20 PROCEDURE — 99205 OFFICE O/P NEW HI 60 MIN: CPT | Performed by: PSYCHIATRY & NEUROLOGY

## 2022-06-20 RX ORDER — TRAZODONE HYDROCHLORIDE 50 MG/1
TABLET ORAL
COMMUNITY
End: 2022-09-28

## 2022-06-20 RX ORDER — PRAVASTATIN SODIUM 20 MG/1
20 TABLET ORAL
COMMUNITY
End: 2022-09-28

## 2022-06-20 NOTE — PROGRESS NOTES
NEUROLOGY NEW PATIENT OFFICE CONSULTATION      6/20/2022    RE: Leia Wall.         1955      REFERRED BY:  Janette Cortés MD        CHIEF COMPLAINT:  This is Leia Wall. is a 61 y.o. male who had concerns including Memory Loss. HPI:     Rudy Lopez Jr. is a male who  has a past medical history of H/O seasonal allergies and Hypertension. 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 (-) helmet (+) confused. Patient was brought to [facility] and found to have a skull fracture with note of weakness of the L arm consistent with traumatic brain injury (TBI). CT head in 2014 did show healed left temporal bone fracture and focal right temporal lobe encephalomalacia.  Patient was discharged on unknown seizure meds. 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 [facility]. Since then, patient will have episodes of \"blacking out\" every 2-3 months, described as confused with blank stares and mumbling concerning for complex partial seizure with secondary generalization. Neurologic exam revealed a spatic L gait which can explain his gait and balance issues.     Patient was last seen in Dec 2017. Still complaining of cognitive issues. Denies having recent seizure event. Still on Carbamazepine 100 mg tab    Still has not done neuropsych testing    Girlfriend thinks he is having personality changes with increased agitation.    (+) headache top of head - since 1996 after TBI.       ROS   (-) fever  (-) rash  All other systems reviewed and are negative    Past Medical Hx  Past Medical History:   Diagnosis Date    Chronic bronchitis ([address]     H/O seasonal allergies     Hypertension        Social Hx  Social History     Socioeconomic History    Marital status: SINGLE   Tobacco Use    Smoking status: Current Every Day Smoker     Packs/day: 1.00     Types: Cigars     Last attempt to quit: 10/6/2018     Years since quitting: 3.7    Smokeless tobacco: Never Used   Substance and Sexual Activity    Alcohol use: No    Drug use: No       Family Hx  Family History   Family history unknown: Yes       ALLERGIES  No Known Allergies    CURRENT MEDS  Current Outpatient Medications   Medication Sig Dispense Refill    pravastatin (PRAVACHOL) 20 mg tablet Take 20 mg by mouth nightly.  traZODone (DESYREL) 50 mg tablet Take  by mouth nightly.  carBAMazepine (TEGretol) 100 mg chewable tablet CHEW AND SWALLOW 1 TABLET BY MOUTH 3 TIMES DAILY 90 Tablet 1    losartan (COZAAR) 100 mg tablet Take 1 Tablet by mouth daily. 90 Tablet 0    fluticasone propionate (FLONASE) 50 mcg/actuation nasal spray 2 Sprays by Both Nostrils route daily. 16 g 2    levocetirizine (XYZAL) 5 mg tablet Take 1 Tablet by mouth daily. 30 Tablet 2    metoprolol tartrate (LOPRESSOR) 25 mg tablet Take 1 Tablet by mouth daily. Indications: high blood pressure 90 Tablet 1    FLUoxetine (PROzac) 40 mg capsule TAKE ONE CAPSULE BY MOUTH EVERY DAY 90 Capsule 0    pravastatin (PRAVACHOL) 10 mg tablet TAKE ONE TABLET BY MOUTH NIGHTLY 90 Tablet 0    omeprazole (PRILOSEC) 40 mg capsule TAKE ONE CAPSULE BY MOUTH EVERY DAY 90 Capsule 1    multivit-mins/iron/folic/lycop (CENTRUM MEN PO) Take 1 Tablet by mouth daily.  diphenhydrAMINE (BENADRYL ALLERGY) 25 mg tablet Take 1 Tab by mouth every six (6) hours as needed (Allergies). 30 Tab 1    aspirin delayed-release 81 mg tablet Take  by mouth daily. PREVIOUS WORKUP: (reviewed)  IMAGING:    CT Results (recent):  Results from Hospital Encounter encounter on 09/17/14    CT HEAD W WO CONT    Narrative  **Final Report**      ICD Codes / Adm. Diagnosis: 803.00  477.9 / Other closed skull fracture wi  Examination:  CT HEAD W AND WO CON  - [id number] - Sep 17 2014  1:54PM  Accession No:  [id number]  Reason:  sequelae from skull fracture      REPORT:  INDICATION:   sequelae from skull fracture    EXAM:  HEAD CT WITH AND WITHOUT CONTRAST    COMPARISON:  March 22, 2013    TECHNIQUE:  Axial head CT was performed both before and after the uneventful  administration of 100 CC Optiray 320. FINDINGS:    The ventricles are midline without hydrocephalus. There is no acute intra  or extra-axial fluid hemorrhage. There is a focal area of encephalomalacia  in the lateral aspect of the right temporal lobe. The basal cisterns are  patent. The paranasal sinuses are clear. There is no abnormal parenchymal or  meningeal enhancement. No acute skull fracture identified. There is a  corticated lucency in the left temporal bone anterior to the mastoid which  could represent healed fracture. Mastoid air cells and paranasal sinuses are  clear. There is no significant soft tissue swelling. Impression  :    No acute abnormality. Suspect healed left temporal bone fracture and focal  right temporal lobe encephalomalacia. No abnormal enhancement. Signing/Reading Doctor: Reza Webber ([id number])  Approved: Reza Webber ([id number])  Sep 17 2014  2:55PM      MRI Results (recent):  No results found for this or any previous visit. IR Results (recent):  No results found for this or any previous visit. VAS/US Results (recent):  No results found for this or any previous visit.           LABS (reviewed)  Results for orders placed or performed in visit on 05/05/22   CARBAMAZEPINE   Result Value Ref Range    Carbamazepine 9.1 4.0 - 12.0 ug/mL   HGB & HCT   Result Value Ref Range    HGB 14.6 12.1 - 17.0 g/dL    HCT 47.4 36.6 - 50.3 %   TSH 3RD GENERATION   Result Value Ref Range    TSH 0.94 0.36 - 2.91 uIU/mL   METABOLIC PANEL, COMPREHENSIVE   Result Value Ref Range    Sodium 140 136 - 145 mmol/L    Potassium 4.9 3.5 - 5.1 mmol/L    Chloride 106 97 - 108 mmol/L    CO2 28 21 - 32 mmol/L    Anion gap 6 5 - 15 mmol/L    Glucose 96 65 - 100 mg/dL    BUN 17 6 - 20 MG/DL    Creatinine 0.95 0.70 - 1.30 MG/DL    BUN/Creatinine ratio 18 12 - 20 GFR est AA >60 >60 ml/min/1.73m2    GFR est non-AA >60 >60 ml/min/1.73m2    Calcium 8.7 8.5 - 10.1 MG/DL    Bilirubin, total 0.4 0.2 - 1.0 MG/DL    ALT (SGPT) 24 12 - 78 U/L    AST (SGOT) 18 15 - 37 U/L    Alk. phosphatase 112 45 - 117 U/L    Protein, total 6.7 6.4 - 8.2 g/dL    Albumin 4.0 3.5 - 5.0 g/dL    Globulin 2.7 2.0 - 4.0 g/dL    A-G Ratio 1.5 1.1 - 2.2     LIPID PANEL   Result Value Ref Range    Cholesterol, total 194 <200 MG/DL    Triglyceride 163 (H) <150 MG/DL    HDL Cholesterol 50 MG/DL    LDL, calculated 111.4 (H) 0 - 100 MG/DL    VLDL, calculated 32.6 MG/DL    CHOL/HDL Ratio 3.9 0.0 - 5.0     SAMPLES BEING HELD   Result Value Ref Range    SAMPLES BEING HELD 1SST 1POUR OFF SERUM     COMMENT        Add-on orders for these samples will be processed based on acceptable specimen integrity and analyte stability, which may vary by analyte. Physical Exam:     Visit Vitals  BP (!) 140/80   Pulse 86   Ht 5' 8\" (1.727 m)   Wt 103.7 kg (228 lb 9.6 oz)   SpO2 96%   BMI 34.76 kg/m²     General:  Alert, cooperative, no distress. Head:  Normocephalic, without obvious abnormality, atraumatic. Eyes:  Conjunctivae/corneas clear. Lungs:  Heart:   Non labored breathing  Regular rate and rhythm, no carotid bruits   Abdomen:   Soft, non-distended   Extremities: Extremities normal, atraumatic, no cyanosis or edema. Pulses: 2+ and symmetric all extremities. Skin: Skin color, texture, turgor normal. No rashes or lesions.   Neurologic Exam     Gen: Attention normal             Language: naming, repetition, fluency normal             Memory: intact recent and remote memory  Cranial Nerves:  I: smell Not tested   II: visual fields Full to confrontation   II: pupils Equal, round, reactive to light   II: optic disc No papilledema   III,VII: ptosis none   III,IV,VI: extraocular muscles  Full ROM   V: mastication normal   V: facial light touch sensation  normal   VII: facial muscle function   symmetric   VIII: hearing symmetric   IX: soft palate elevation  normal   XI: trapezius strength  5/5   XI: sternocleidomastoid strength 5/5   XI: neck flexion strength  5/5   XII: tongue  midline     Motor: (+) dec L finger and L foot tapping  Sensory: intact to LT, PP, vibration, and JPS  Reflexes: 2+ throughout; Down going toes  Coordination: Good FTN and HTS, Romberg negative  Gait: Drags his L side with spatic gait         Impression:     Mainor Pastrana Jr. is a male who  has a past medical history of H/O seasonal allergies and Hypertension. 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 (-) helmet (+) confused. [place] [facility] [place] to have a skull fracture with note of weakness of the L arm consistent with traumatic brain injury (TBI). CT head in 2014 did show healed left temporal bone fracture and focal right temporal lobe encephalomalacia.  Patient was discharged on unknown seizure meds. 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 Since then, patient will have episodes of \"blacking out\" every 2-3 months, described as confused with blank stares and mumbling concerning for complex partial seizure with secondary generalization. Neurologic exam revealed a spatic L gait which can explain his gait and balance issues. Considerations include TBI now with agitation; evaluate for stroke    RECOMMENDATIONS  1. I had a long discussion with patient. Discussed diagnosis, prognosis, pathophysiology and available treatment. Reviewed test results. All questions were answered. 2. MRI brain to look for stroke and interval change of prior TBI  3. Will refer to Dr Agata Angela for neuropsych testing  4. Depending on above, will consider trial of Aricept  5. Still on Tegretol 100 mg TID   6. On Fluoxetine 40 mg every day c/o PCP for mood issuee.   7. Reviewed medical records in EPIC            Follow-up and Dispositions · Return for review of results.             Thank you for the consultation      Valeria Hook MD  Diplomate, American Board of Psychiatry and Neurology  Diplomate, Neuromuscular Medicine  Diplomate, American Board of Electrodiagnostic Medicine        CC: Shabana Rogers MD  Fax: [phone]

## 2022-06-20 NOTE — LETTER
6/20/2022    Patient: Ramona Mills. YOB: 1955   Date of Visit: 6/20/2022     Mitul Evans MD  [address] [phone]  Via In [address], [address] [place] [address]  Via In Basket    Dear MD Zoe Byrd MD,      Thank you for referring Mr. Dana Faulkner to [address] for evaluation. My notes for this consultation are attached. If you have questions, please do not hesitate to call me. I look forward to following your patient along with you.       Sincerely,    Herlinda Ash MD

## 2022-06-30 ENCOUNTER — HOSPITAL ENCOUNTER (OUTPATIENT)
Dept: MRI IMAGING | Age: 67
Discharge: HOME | End: 2022-06-30
Attending: PSYCHIATRY & NEUROLOGY
Payer: MEDICARE

## 2022-06-30 ENCOUNTER — HOSPITAL ENCOUNTER (OUTPATIENT)
Dept: GENERAL RADIOLOGY | Age: 67
Discharge: HOME | End: 2022-06-30
Attending: PSYCHIATRY & NEUROLOGY
Payer: MEDICARE

## 2022-06-30 DIAGNOSIS — T15.90XA FOREIGN BODY ACCIDENTALLY ENTERING EYE AND ADNEXA: ICD-10-CM

## 2022-06-30 DIAGNOSIS — S06.9X9S TRAUMATIC BRAIN INJURY WITH LOSS OF CONSCIOUSNESS, SEQUELA (HCC): ICD-10-CM

## 2022-06-30 PROCEDURE — 70030 X-RAY EYE FOR FOREIGN BODY: CPT

## 2022-06-30 PROCEDURE — 70551 MRI BRAIN STEM W/O DYE: CPT

## 2022-07-05 ENCOUNTER — TELEPHONE (OUTPATIENT)
Dept: NEUROLOGY | Age: 67
End: 2022-07-05

## 2022-07-05 NOTE — TELEPHONE ENCOUNTER
----- Message from Sher Mota MD sent at 6/30/2022  3:26 PM EDT -----  Pls inform patient MRI brain findings are stable, showing only his old brain injury.     ----- Message -----  From: Ryley Miller Results In  Sent: 6/30/2022   1:45 PM EDT  To: Sher Mota MD

## 2022-07-11 ENCOUNTER — TELEPHONE (OUTPATIENT)
Dept: NEUROLOGY | Age: 67
End: 2022-07-11

## 2022-07-11 NOTE — TELEPHONE ENCOUNTER
----- Message from Lebron Quinteros MD sent at 6/30/2022  3:26 PM EDT -----  Pls inform patient MRI brain findings are stable, showing only his old brain injury.     ----- Message -----  From: Ryley Miller Results In  Sent: 6/30/2022   1:45 PM EDT  To: Lebron Quinteros MD

## 2022-08-01 ENCOUNTER — OFFICE VISIT (OUTPATIENT)
Dept: NEUROLOGY | Age: 67
End: 2022-08-01
Payer: MEDICARE

## 2022-08-01 DIAGNOSIS — F41.0 SEVERE ANXIETY WITH PANIC: ICD-10-CM

## 2022-08-01 DIAGNOSIS — F41.8 ANXIETY ABOUT HEALTH: ICD-10-CM

## 2022-08-01 DIAGNOSIS — R45.4 IRRITABILITY AND ANGER: ICD-10-CM

## 2022-08-01 DIAGNOSIS — S06.9X9S TRAUMATIC BRAIN INJURY WITH LOSS OF CONSCIOUSNESS, SEQUELA (HCC): ICD-10-CM

## 2022-08-01 DIAGNOSIS — G31.84 MILD COGNITIVE IMPAIRMENT: Primary | ICD-10-CM

## 2022-08-01 DIAGNOSIS — R90.89 ABNORMAL BRAIN MRI: ICD-10-CM

## 2022-08-01 DIAGNOSIS — R41.3 SHORT-TERM MEMORY LOSS: ICD-10-CM

## 2022-08-01 DIAGNOSIS — R41.89 COGNITIVE DECLINE: ICD-10-CM

## 2022-08-01 PROCEDURE — 96133 NRPSYC TST EVAL PHYS/QHP EA: CPT | Performed by: CLINICAL NEUROPSYCHOLOGIST

## 2022-08-01 PROCEDURE — 96136 PSYCL/NRPSYC TST PHY/QHP 1ST: CPT | Performed by: CLINICAL NEUROPSYCHOLOGIST

## 2022-08-01 PROCEDURE — 96137 PSYCL/NRPSYC TST PHY/QHP EA: CPT | Performed by: CLINICAL NEUROPSYCHOLOGIST

## 2022-08-01 PROCEDURE — 90791 PSYCH DIAGNOSTIC EVALUATION: CPT | Performed by: CLINICAL NEUROPSYCHOLOGIST

## 2022-08-01 PROCEDURE — 96139 PSYCL/NRPSYC TST TECH EA: CPT | Performed by: CLINICAL NEUROPSYCHOLOGIST

## 2022-08-01 PROCEDURE — 96138 PSYCL/NRPSYC TECH 1ST: CPT | Performed by: CLINICAL NEUROPSYCHOLOGIST

## 2022-08-01 PROCEDURE — 96132 NRPSYC TST EVAL PHYS/QHP 1ST: CPT | Performed by: CLINICAL NEUROPSYCHOLOGIST

## 2022-08-01 NOTE — LETTER
8/3/2022    Patient: Edna Powers. YOB: 1955   Date of Visit: 8/1/2022     Zulma Thorpe MD  [address] [phone]  Via In [facility] [address]    Dear Zulma Thorpe MD,      Thank you for referring Mr. Melita Ruggiero to [address] for evaluation. My notes for this consultation are attached. If you have questions, please do not hesitate to call me. I look forward to following your patient along with you.       Sincerely,    Meghann Starkey PsyD
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
## 2022-09-16 DIAGNOSIS — F32.A DEPRESSION DUE TO HEAD INJURY: ICD-10-CM

## 2022-09-16 DIAGNOSIS — S09.90XA DEPRESSION DUE TO HEAD INJURY: ICD-10-CM

## 2022-09-16 RX ORDER — FLUOXETINE HYDROCHLORIDE 40 MG/1
CAPSULE ORAL
Qty: 90 CAPSULE | Refills: 0 | Status: SHIPPED | OUTPATIENT
Start: 2022-09-16

## 2022-09-22 ENCOUNTER — TELEPHONE (OUTPATIENT)
Dept: FAMILY MEDICINE CLINIC | Age: 67
End: 2022-09-22

## 2022-09-22 NOTE — TELEPHONE ENCOUNTER
Pt dropped off some forms about jury duty and is needing a letter from Dr. Boo Rider stating that he can not do jury duty b/c of current health conditions. Pt states that the letter has to be sent by 10/1. Pt also did not sign one of the forms that he gave Dr. Boo Rider so call him once the letter is ready so that he can sign the form.

## 2022-09-28 ENCOUNTER — OFFICE VISIT (OUTPATIENT)
Dept: FAMILY MEDICINE CLINIC | Age: 67
End: 2022-09-28
Payer: MEDICARE

## 2022-09-28 VITALS
HEART RATE: 69 BPM | HEIGHT: 68 IN | WEIGHT: 229 LBS | TEMPERATURE: 99.3 F | BODY MASS INDEX: 34.71 KG/M2 | RESPIRATION RATE: 18 BRPM | SYSTOLIC BLOOD PRESSURE: 163 MMHG | OXYGEN SATURATION: 97 % | DIASTOLIC BLOOD PRESSURE: 90 MMHG

## 2022-09-28 DIAGNOSIS — Z87.820 HISTORY OF TRAUMATIC BRAIN INJURY: ICD-10-CM

## 2022-09-28 DIAGNOSIS — J44.9 CHRONIC OBSTRUCTIVE PULMONARY DISEASE, UNSPECIFIED COPD TYPE (HCC): ICD-10-CM

## 2022-09-28 DIAGNOSIS — Z72.0 TOBACCO ABUSE: ICD-10-CM

## 2022-09-28 DIAGNOSIS — F41.9 ANXIETY: ICD-10-CM

## 2022-09-28 DIAGNOSIS — I10 ESSENTIAL HYPERTENSION: Primary | ICD-10-CM

## 2022-09-28 DIAGNOSIS — F33.9 RECURRENT DEPRESSION (HCC): ICD-10-CM

## 2022-09-28 PROCEDURE — G8753 SYS BP > OR = 140: HCPCS | Performed by: FAMILY MEDICINE

## 2022-09-28 PROCEDURE — G8536 NO DOC ELDER MAL SCRN: HCPCS | Performed by: FAMILY MEDICINE

## 2022-09-28 PROCEDURE — 1123F ACP DISCUSS/DSCN MKR DOCD: CPT | Performed by: FAMILY MEDICINE

## 2022-09-28 PROCEDURE — 1101F PT FALLS ASSESS-DOCD LE1/YR: CPT | Performed by: FAMILY MEDICINE

## 2022-09-28 PROCEDURE — G8755 DIAS BP > OR = 90: HCPCS | Performed by: FAMILY MEDICINE

## 2022-09-28 PROCEDURE — 99214 OFFICE O/P EST MOD 30 MIN: CPT | Performed by: FAMILY MEDICINE

## 2022-09-28 PROCEDURE — G8417 CALC BMI ABV UP PARAM F/U: HCPCS | Performed by: FAMILY MEDICINE

## 2022-09-28 PROCEDURE — G8427 DOCREV CUR MEDS BY ELIG CLIN: HCPCS | Performed by: FAMILY MEDICINE

## 2022-09-28 PROCEDURE — 3017F COLORECTAL CA SCREEN DOC REV: CPT | Performed by: FAMILY MEDICINE

## 2022-09-28 PROCEDURE — G9717 DOC PT DX DEP/BP F/U NT REQ: HCPCS | Performed by: FAMILY MEDICINE

## 2022-09-28 RX ORDER — BUSPIRONE HYDROCHLORIDE 7.5 MG/1
7.5 TABLET ORAL 2 TIMES DAILY
Qty: 60 TABLET | Refills: 2 | Status: SHIPPED | OUTPATIENT
Start: 2022-09-28

## 2022-09-28 NOTE — PROGRESS NOTES
1. Have you been to the ER, urgent care clinic since your last visit? Hospitalized since your last visit? No     2. Have you seen or consulted any other health care providers outside of the [address] [place] [address] since your last visit? Include any pap smears or colon screening. No     3. For patients aged 39-70: Has the patient had a colonoscopy / FIT/ Cologuard? 2014? If the patient is female:    4. For patients aged 41-77: Has the patient had a mammogram within the past 2 years? NA - based on age or sex      11. For patients aged 21-65: Has the patient had a pap smear? NA - based on age or sex     Reviewed record in preparation for visit and have necessary documentation  Pt did not bring medication to office visit for review  Patient is accompanied by self I have received verbal consent from [place]. to discuss any/all medical information while they are present in the room.     Goals that were addressed and/or need to be completed during or after this appointment include     Health Maintenance Due   Topic Date Due    Colorectal Cancer Screening Combo  12/10/2015    Pneumococcal 65+ years (2 - PCV) 10/26/2018    COVID-19 Vaccine (4 - Booster for Moderna series) 04/07/2022    Flu Vaccine (1) 08/01/2022

## 2022-09-30 RX ORDER — PRAVASTATIN SODIUM 10 MG/1
TABLET ORAL
Qty: 90 TABLET | Refills: 0 | Status: SHIPPED | OUTPATIENT
Start: 2022-09-30

## 2022-10-01 NOTE — PROGRESS NOTES
Progress Note    Patient: Rosana Kauffman. MRN: [id number]  SSN: [id number]    YOB: 1955  Age: 74 y.o. Sex: male        Subjective:     Chief Complaint   Patient presents with    Documentation     Documentation for jury duty        HPI: he is a 74y.o. year old male who presents for follow up of his chronic health conditions. Patient with hx of skull fracture with resultant depression, neurogenic pain and hearing loss. He says his mood is stable with current dose of fluoxetine. He has paperwork to excuse him from jury duty. Patient denies HA, CP, dysuria, acute weakness or paresthesia. He continues to smoke. Hypertension:  The patient reports he is taking medications as instructed, no medication side effects noted, no TIA's, no chest pain on exertion, notes new dyspnea on exertion, no swelling of ankles. BP Readings from Last 3 Encounters:   09/28/22 (!) 163/90   06/20/22 (!) 140/80   05/03/22 (!) 154/87     Patient advised to log blood pressures at home weekly and bring to next visit. Call office as soon as possible if BP's over 140/90 on multiple occasions or with symptoms of dizziness, chest pain, shortness of breath, headache or ankle swelling. Our goal is to normalize the blood pressure to decrease the risks of strokes and heart attacks. The patient is in agreement with the plan. Current and past medical information:    Current Medications after this visit[de-identified]     Current Outpatient Medications   Medication Sig    busPIRone (BUSPAR) 7.5 mg tablet Take 1 Tablet by mouth two (2) times a day. FLUoxetine (PROzac) 40 mg capsule TAKE ONE CAPSULE BY MOUTH EVERY DAY    carBAMazepine (TEGretol) 100 mg chewable tablet CHEW AND SWALLOW 1 TABLET BY MOUTH 3 TIMES DAILY    levocetirizine (XYZAL) 5 mg tablet TAKE ONE TABLET BY MOUTH EVERY DAY    omeprazole (PRILOSEC) 40 mg capsule TAKE ONE CAPSULE BY MOUTH EVERY DAY    losartan (COZAAR) 100 mg tablet Take 1 Tablet by mouth daily. fluticasone propionate (FLONASE) 50 mcg/actuation nasal spray 2 Sprays by Both Nostrils route daily. metoprolol tartrate (LOPRESSOR) 25 mg tablet Take 1 Tablet by mouth daily. Indications: high blood pressure    multivit-mins/iron/folic/lycop (CENTRUM MEN PO) Take 1 Tablet by mouth daily. diphenhydrAMINE (BENADRYL ALLERGY) 25 mg tablet Take 1 Tab by mouth every six (6) hours as needed (Allergies). aspirin delayed-release 81 mg tablet Take  by mouth daily. pravastatin (PRAVACHOL) 10 mg tablet TAKE ONE TABLET BY MOUTH NIGHTLY     No current facility-administered medications for this visit. Patient Active Problem List    Diagnosis Date Noted    Mild cognitive impairment 08/01/2022    Non-compliance 06/18/2019    Simple chronic bronchitis ([facility] [address] 06/18/2019    Chronic rhinitis 06/18/2019    Recurrent depression ([facility] 02/26/2018    Skull fracture ([facility] [address] 06/11/2014    Depression due to head injury 06/11/2014    Allergic arthritis involving hand 06/11/2014    Neurogenic pain 04/04/2014    Hearing loss of both ears 09/23/2013    Hyperlipidemia 02/19/2013    Back pain 07/19/2012    Chronic SI joint pain 07/19/2012    Chest pain, unspecified 04/17/2012    Hand pain 02/27/2012    Tobacco abuse 02/27/2012    HTN (hypertension) 02/27/2012    Tinnitus 02/27/2012       Past Medical History:   Diagnosis Date    Chronic bronchitis ([facility] [address]     H/O seasonal allergies     Hypertension        No Known Allergies    No past surgical history on file.     Social History     Socioeconomic History    Marital status: SINGLE   Tobacco Use    Smoking status: Every Day     Packs/day: 1.00     Types: Cigars, Cigarettes     Last attempt to quit: 10/6/2018     Years since quitting: 3.9    Smokeless tobacco: Never   Substance and Sexual Activity    Alcohol use: No    Drug use: No         Objective:     Review of Systems:  Constitutional: Negative for fatigue or malaise  Derm: Negative for rash or lesion  HEENT: Negative for acute hearing or vision changes  Cardiovascular: Negative for dizziness, chest pain or palpitations  Respiratory: Negative for productive cough and wheezing  Gastrointestinal: Negative for nausea or abdominal pain  Musculoskeletal: see HPI  Neurological: see HPI, Negative for weakness or paresthesia  Psychological: see HPI      Vitals:    09/28/22 1455 09/28/22 1607   BP: (!) 175/98 (!) 163/90   Pulse: 69    Resp: 18    Temp: 99.3 °F (37.4 °C)    TempSrc: Oral    SpO2: 97%    Weight: 229 lb (103.9 kg)    Height: 5' 8\" (1.727 m)       Body mass index is 34.82 kg/m². Physical Exam:  Constitutional: well developed, well nourished, in no acute distress  Skin: warm and dry, normal tone and turgor  Head: normocephalic, atraumatic  Eyes: sclera clear, EOMI  Neck: normal range of motion  CV: normal S1, S2, regular rate and rhythm  Respiratory: clear with symmetrical effort  Extremities: full range of motion, antalgic gait  Neurology: normal strength and sensation  Psych: active, alert and oriented, affect appropriate       Assessment and orders:     Diagnoses and all orders for this visit:    1. Essential hypertension    2. Chronic obstructive pulmonary disease, unspecified COPD type ([address]    3. Anxiety  -     busPIRone (BUSPAR) 7.5 mg tablet; Take 1 Tablet by mouth two (2) times a day. 4. Recurrent depression ([address]    5. History of traumatic brain injury    6. Tobacco abuse      Plan of care:  Diagnoses were discussed in detail with patient. Strongly advised patient to stop all use of tobacco products. Medication risks/benefits/side effects discussed with patient. All of the patient's questions were addressed and answered to apparent satisfaction. The patient understands and agrees with our plan of care. The patient knows to call back if they have questions about the plan of care or if symptoms change. The patient received an After-Visit Summary which contains VS, diagnoses, orders, allergy and medication lists. Future Appointments   Date Time Provider Vanessa Gutierrez   10/25/2022 11:20 AM Anahi Guillen PsyD [facility] BS AMB   11/2/2022  9:20 AM MD YOGESH Oglesby BS AMB       Patient Care Team:  Kesha Moise MD as PCP - General (Family Medicine)  Kesha Moise MD as PCP - [facility] Empaneled Provider  Garrison Hernandez MD (Rheumatology Internal Medicine)  Sangeetha Cordova MD (Otolaryngology)  Raiza Truong MD (Cardiovascular Disease Physician)  Kaylee Cain MD (Neurology)  Kaylee Cain MD (Neurology)          Future Appointments   Date Time Provider Vanessa Gutierrez   10/25/2022 11:20 AM Anahi Guillen PsyD [address] [facility] BS AMB   11/2/2022  9:20 [facility]       Signed By: Norma Alvares MD     September 30, 2022

## 2022-10-06 ENCOUNTER — TELEPHONE (OUTPATIENT)
Dept: FAMILY MEDICINE CLINIC | Age: 67
End: 2022-10-06

## 2022-10-10 DIAGNOSIS — R56.9 CONVULSIONS, UNSPECIFIED CONVULSION TYPE (HCC): ICD-10-CM

## 2022-10-12 RX ORDER — CARBAMAZEPINE 100 MG/1
TABLET, CHEWABLE ORAL
Qty: 90 TABLET | Refills: 0 | Status: SHIPPED | OUTPATIENT
Start: 2022-10-12

## 2022-10-25 ENCOUNTER — OFFICE VISIT (OUTPATIENT)
Dept: NEUROLOGY | Age: 67
End: 2022-10-25
Payer: MEDICARE

## 2022-10-25 DIAGNOSIS — G31.84 MILD COGNITIVE IMPAIRMENT: Primary | ICD-10-CM

## 2022-10-25 DIAGNOSIS — S06.9X9S TRAUMATIC BRAIN INJURY WITH LOSS OF CONSCIOUSNESS, SEQUELA (HCC): ICD-10-CM

## 2022-10-25 DIAGNOSIS — F41.0 SEVERE ANXIETY WITH PANIC: ICD-10-CM

## 2022-10-25 PROCEDURE — 90832 PSYTX W PT 30 MINUTES: CPT | Performed by: CLINICAL NEUROPSYCHOLOGIST

## 2022-10-25 PROCEDURE — 1123F ACP DISCUSS/DSCN MKR DOCD: CPT | Performed by: CLINICAL NEUROPSYCHOLOGIST

## 2022-10-25 NOTE — PROGRESS NOTES
Prior to seeing the patient I reviewed the records, including the previously completed report, the records in [place], and any updated visits from other providers since I saw the patient last.      Today, I engaged in a psychoeducational and supportive and cognitive/behavioral psychotherapy session with the patient. I provided psychotherapy in the form of psychoeducation and support with respect to the results of the recent Neuropsychological Evaluation, including discussing individual tests as well as patient's areas of neurocognitive strength versus weakness. We discussed, in detail, the following: This patient generated a mixed normal/abnormal range Neuropsychological Evaluation with respect to neurocognitive functioning. In this regard, he is showing problems with verbal fluency, sustained visual attention, perceptual reasoning, working memory, and processing speed. At the same time, his executive functioning, auditory learning, auditory memory, confrontation naming, motor skills, and performances across all other neurocognitive domains assessed are within or above the normal range. From an emotional standpoint, he reports moderate anxiety with panic and is also in significant physical pain. Thankfully, this profile is not consistent with a dementia type process. Instead, he has cognitive issues with likely residual effects from his traumatic brain injury for many years ago but there is no evidence of an organic based memory disorder. Instead, he has marked difficulty sustaining attention and focus when he is trying to create a memory and also has significant anxiety which interferes with his capacity to store, hold, and recall new information.   In addition to continued medical care, my recommendations include consideration for psychiatric treatment for anxiety and panic, active engagement in supportive counseling, and consideration for an appropriate (less/nonstimulant) medication for attention if this not medically contraindicated. The patient should be encouraged to remain as mentally, physically, and socially active as possible. I am not concerned about competency/capacity, driving, day-to-day supervision, etc.  I wish him well. That his memory scores are normal is reassuring. We do have extensive baseline neurocognitive psychologic data on him. Follow-up as needed. Clinical correlation is, of course, indicated. I will discuss these findings with the patient and family when they follow up with me in the near future. A follow up Neuropsychological Evaluation is indicated on a prn basis. DIAGNOSES: MCI Without Memory Loss (Fluency, Attention, Working Memory, Processing Speed)                          Anxiety with Panic                          Status Post Traumatic Brain Injury    Education was provided regarding my diagnostic impressions, and we discussed treatment plan/options. I also answered numerous questions related to the clinical findings, including discussing various methods to improve cognition and mood. Counseling provided regarding mood and cognition. CBT and supportive psychotherapy techniques were utilized. Supportive/Cognitive Behavioral/Solution Focused psychotherapy provided  Discussed rational versus irrational thinking patterns and their consequences. Discussed healthy/adaptive and unhealthy/maladaptive coping. The patient needs to    Specific areas which were addressed include:     The patient had the following concerns which I deferred to their referring provider: meds for mood/cognition      Time spent today: 20

## 2022-11-02 ENCOUNTER — OFFICE VISIT (OUTPATIENT)
Dept: FAMILY MEDICINE CLINIC | Age: 67
End: 2022-11-02
Payer: MEDICARE

## 2022-11-02 VITALS
RESPIRATION RATE: 20 BRPM | BODY MASS INDEX: 34.19 KG/M2 | TEMPERATURE: 99 F | HEART RATE: 65 BPM | OXYGEN SATURATION: 97 % | HEIGHT: 68 IN | SYSTOLIC BLOOD PRESSURE: 165 MMHG | WEIGHT: 225.6 LBS | DIASTOLIC BLOOD PRESSURE: 107 MMHG

## 2022-11-02 DIAGNOSIS — I10 ESSENTIAL HYPERTENSION: ICD-10-CM

## 2022-11-02 DIAGNOSIS — R56.9 CONVULSIONS, UNSPECIFIED CONVULSION TYPE (HCC): ICD-10-CM

## 2022-11-02 DIAGNOSIS — Z91.09 ENVIRONMENTAL ALLERGIES: ICD-10-CM

## 2022-11-02 DIAGNOSIS — E78.5 HYPERLIPIDEMIA, UNSPECIFIED HYPERLIPIDEMIA TYPE: ICD-10-CM

## 2022-11-02 DIAGNOSIS — Z00.00 MEDICARE ANNUAL WELLNESS VISIT, SUBSEQUENT: ICD-10-CM

## 2022-11-02 DIAGNOSIS — M79.642 PAIN OF LEFT HAND: Primary | ICD-10-CM

## 2022-11-02 PROCEDURE — 3074F SYST BP LT 130 MM HG: CPT | Performed by: FAMILY MEDICINE

## 2022-11-02 PROCEDURE — G9717 DOC PT DX DEP/BP F/U NT REQ: HCPCS | Performed by: FAMILY MEDICINE

## 2022-11-02 PROCEDURE — G8755 DIAS BP > OR = 90: HCPCS | Performed by: FAMILY MEDICINE

## 2022-11-02 PROCEDURE — G8417 CALC BMI ABV UP PARAM F/U: HCPCS | Performed by: FAMILY MEDICINE

## 2022-11-02 PROCEDURE — 99214 OFFICE O/P EST MOD 30 MIN: CPT | Performed by: FAMILY MEDICINE

## 2022-11-02 PROCEDURE — 3078F DIAST BP <80 MM HG: CPT | Performed by: FAMILY MEDICINE

## 2022-11-02 PROCEDURE — 1101F PT FALLS ASSESS-DOCD LE1/YR: CPT | Performed by: FAMILY MEDICINE

## 2022-11-02 PROCEDURE — G8536 NO DOC ELDER MAL SCRN: HCPCS | Performed by: FAMILY MEDICINE

## 2022-11-02 PROCEDURE — G0439 PPPS, SUBSEQ VISIT: HCPCS | Performed by: FAMILY MEDICINE

## 2022-11-02 PROCEDURE — 3017F COLORECTAL CA SCREEN DOC REV: CPT | Performed by: FAMILY MEDICINE

## 2022-11-02 PROCEDURE — G8753 SYS BP > OR = 140: HCPCS | Performed by: FAMILY MEDICINE

## 2022-11-02 PROCEDURE — G8427 DOCREV CUR MEDS BY ELIG CLIN: HCPCS | Performed by: FAMILY MEDICINE

## 2022-11-02 PROCEDURE — 1123F ACP DISCUSS/DSCN MKR DOCD: CPT | Performed by: FAMILY MEDICINE

## 2022-11-02 RX ORDER — LOSARTAN POTASSIUM 100 MG/1
100 TABLET ORAL DAILY
Qty: 90 TABLET | Refills: 1 | Status: SHIPPED | OUTPATIENT
Start: 2022-11-02

## 2022-11-02 RX ORDER — METOPROLOL TARTRATE 25 MG/1
25 TABLET, FILM COATED ORAL DAILY
Qty: 90 TABLET | Refills: 1 | Status: SHIPPED | OUTPATIENT
Start: 2022-11-02

## 2022-11-02 RX ORDER — DICLOFENAC SODIUM 75 MG/1
75 TABLET, DELAYED RELEASE ORAL
Qty: 60 TABLET | Refills: 0 | Status: SHIPPED | OUTPATIENT
Start: 2022-11-02

## 2022-11-02 NOTE — PROGRESS NOTES
1. \"Have you been to the ER, urgent care clinic since your last visit? Hospitalized since your last visit? \" No    2. \"Have you seen or consulted any other health care providers outside of the [address] [place] [address] since your last visit? \" No    3. For patients aged 39-70: Has the patient had a colonoscopy / FIT/ Cologuard? No    If the patient is female:    4. For patients aged 41-77: Has the patient had a mammogram within the past 2 years? NA - based on age or sex    11. For patients aged 21-65: Has the patient had a pap smear? NA - based on age or sex     Patient is accompanied by self I have received verbal consent from Anuja Cage. to discuss any/all medical information while they are present in the room.   Reviewed record in preparation for visit and have necessary documentation  Goals that were addressed and/or need to be completed during or after this appointment include     Health Maintenance Due   Topic Date Due    Colorectal Cancer Screening Combo  12/10/2015    Pneumococcal 65+ years (2 - PCV) 10/26/2018    COVID-19 Vaccine (4 - Booster for Moderna series) 02/01/2022    Flu Vaccine (1) 08/01/2022    Medicare Yearly Exam  11/04/2022

## 2022-11-02 NOTE — PATIENT INSTRUCTIONS
Joe Franco M.D. Gastroenterology  Replaced by [id number]  Phone: [phone]  Medicare Wellness Visit, Male    The best way to live healthy is to have a lifestyle where you eat a well-balanced diet, exercise regularly, limit alcohol use, and quit all forms of tobacco/nicotine, if applicable. Regular preventive services are another way to keep healthy. Preventive services (vaccines, screening tests, monitoring & exams) can help personalize your care plan, which helps you manage your own care. Screening tests can find health problems at the earliest stages, when they are easiest to treat. [facility] follows the current, evidence-based guidelines published by the [facility] Tayo Nelson ([facility]) when recommending preventive services for our patients. Because we follow these guidelines, sometimes recommendations change over time as research supports it. (For example, a prostate screening blood test is no longer routinely recommended for men with no symptoms). Of course, you and your doctor may decide to screen more often for some diseases, based on your risk and co-morbidities (chronic disease you are already diagnosed with). Preventive services for you include:  - Medicare offers their members a free annual wellness visit, which is time for you and your primary care provider to discuss and plan for your preventive service needs. Take advantage of this benefit every year!  -All adults over age 72 should receive the recommended pneumonia vaccines. Current USPSTF guidelines recommend a series of two vaccines for the best pneumonia protection.   -All adults should have a flu vaccine yearly and tetanus vaccine every 10 years.  -All adults age 48 and older should receive the shingles vaccines (series of two vaccines).        -All adults age 38-68 who are overweight should have a diabetes screening test once every three years.   -Other screening tests & preventive services for persons with diabetes include: an eye exam to screen for diabetic retinopathy, a kidney function test, a foot exam, and stricter control over your cholesterol.   -Cardiovascular screening for adults with routine risk involves an electrocardiogram (ECG) at intervals determined by the provider.   -Colorectal cancer screening should be done for adults age 54-65 with no increased risk factors for colorectal cancer. There are a number of acceptable methods of screening for this type of cancer. Each test has its own benefits and drawbacks. Discuss with your provider what is most appropriate for you during your annual wellness visit. The different tests include: colonoscopy (considered the best screening method), a fecal occult blood test, a fecal DNA test, and sigmoidoscopy.  -All adults born between [facility] should be screened once for Hepatitis C.  -An Abdominal Aortic Aneurysm (AAA) Screening is recommended for men age 73-68 who has ever smoked in their lifetime.      Here is a list of your current Health Maintenance items (your personalized list of preventive services) with a due date:  Health Maintenance Due   Topic Date Due    Colorectal Screening  12/10/2015    Pneumococcal Vaccine (2 - PCV) 10/26/2018    COVID-19 Vaccine (4 - Booster for Patty Charnley series) 02/01/2022    Yearly Flu Vaccine (1) 08/01/2022

## 2022-11-03 RX ORDER — LEVOCETIRIZINE DIHYDROCHLORIDE 5 MG/1
5 TABLET, FILM COATED ORAL DAILY
Qty: 30 TABLET | Refills: 2 | Status: SHIPPED | OUTPATIENT
Start: 2022-11-03

## 2022-11-05 LAB
ALBUMIN SERPL-MCNC: 4 G/DL (ref 3.5–5)
ALBUMIN/GLOB SERPL: 1.5 {RATIO} (ref 1.1–2.2)
ALP SERPL-CCNC: 115 U/L (ref 45–117)
ALT SERPL-CCNC: 22 U/L (ref 12–78)
ANION GAP SERPL CALC-SCNC: 10 MMOL/L (ref 5–15)
AST SERPL-CCNC: 22 U/L (ref 15–37)
BILIRUB SERPL-MCNC: 0.3 MG/DL (ref 0.2–1)
BUN SERPL-MCNC: 18 MG/DL (ref 6–20)
BUN/CREAT SERPL: 19 (ref 12–20)
CALCIUM SERPL-MCNC: 8.5 MG/DL (ref 8.5–10.1)
CARBAMAZEPINE SERPL-MCNC: 5.4 UG/ML (ref 4–12)
CHLORIDE SERPL-SCNC: 105 MMOL/L (ref 97–108)
CHOLEST SERPL-MCNC: 186 MG/DL
CO2 SERPL-SCNC: 25 MMOL/L (ref 21–32)
CREAT SERPL-MCNC: 0.95 MG/DL (ref 0.7–1.3)
GLOBULIN SER CALC-MCNC: 2.7 G/DL (ref 2–4)
GLUCOSE SERPL-MCNC: 103 MG/DL (ref 65–100)
HDLC SERPL-MCNC: 47 MG/DL
HDLC SERPL: 4 {RATIO} (ref 0–5)
LDLC SERPL CALC-MCNC: 105.4 MG/DL (ref 0–100)
POTASSIUM SERPL-SCNC: 5.3 MMOL/L (ref 3.5–5.1)
PROT SERPL-MCNC: 6.7 G/DL (ref 6.4–8.2)
SODIUM SERPL-SCNC: 140 MMOL/L (ref 136–145)
TRIGL SERPL-MCNC: 168 MG/DL (ref ?–150)
TSH SERPL DL<=0.05 MIU/L-ACNC: 1.1 UIU/ML (ref 0.36–3.74)
VLDLC SERPL CALC-MCNC: 33.6 MG/DL

## 2022-11-06 NOTE — PROGRESS NOTES
Progress Note    Patient: Seven Sierra. MRN: [id number]  SSN: [id number]    YOB: 1955  Age: 74 y.o. Sex: male        Subjective:     Chief Complaint   Patient presents with    Follow Up Chronic Condition    Hand Pain     Left hand \"arthritis\"       HPI: he is a 74y.o. year old male who presents for follow up of his chronic health conditions. Patient with hx of skull fracture with resultant depression, neurogenic pain and hearing loss. He says his mood is stable with current dose of fluoxetine. He complains of left hand pain. Patient denies HA, CP, dysuria, acute weakness or paresthesia. He continues to smoke. BP uncontrolled. Hypertension:  The patient reports he is taking medications as instructed, no medication side effects noted, no TIA's, no chest pain on exertion, notes new dyspnea on exertion, no swelling of ankles. BP Readings from Last 3 Encounters:   11/02/22 (!) 165/107   09/28/22 (!) 163/90   06/20/22 (!) 140/80     Patient advised to log blood pressures at home weekly and bring to next visit. Call office as soon as possible if BP's over 140/90 on multiple occasions or with symptoms of dizziness, chest pain, shortness of breath, headache or ankle swelling. Our goal is to normalize the blood pressure to decrease the risks of strokes and heart attacks. The patient is in agreement with the plan. Current and past medical information:    Current Medications after this visit[de-identified]     Current Outpatient Medications   Medication Sig    levocetirizine (XYZAL) 5 mg tablet Take 1 Tablet by mouth daily. diclofenac EC (VOLTAREN) 75 mg EC tablet Take 1 Tablet by mouth two (2) times daily as needed for Pain. Take with food. Drink plenty of water. losartan (COZAAR) 100 mg tablet Take 1 Tablet by mouth daily. metoprolol tartrate (LOPRESSOR) 25 mg tablet Take 1 Tablet by mouth daily.  Indications: high blood pressure    carBAMazepine (TEGretol) 100 mg chewable tablet CHEW AND SWALLOW 1 TABLET BY MOUTH 3 TIMES DAILY    pravastatin (PRAVACHOL) 10 mg tablet TAKE ONE TABLET BY MOUTH NIGHTLY    busPIRone (BUSPAR) 7.5 mg tablet Take 1 Tablet by mouth two (2) times a day. FLUoxetine (PROzac) 40 mg capsule TAKE ONE CAPSULE BY MOUTH EVERY DAY    omeprazole (PRILOSEC) 40 mg capsule TAKE ONE CAPSULE BY MOUTH EVERY DAY    fluticasone propionate (FLONASE) 50 mcg/actuation nasal spray 2 Sprays by Both Nostrils route daily. multivit-mins/iron/folic/lycop (CENTRUM MEN PO) Take 1 Tablet by mouth daily. diphenhydrAMINE (BENADRYL ALLERGY) 25 mg tablet Take 1 Tab by mouth every six (6) hours as needed (Allergies). aspirin delayed-release 81 mg tablet Take  by mouth daily. No current facility-administered medications for this visit. Patient Active Problem List    Diagnosis Date Noted    Mild cognitive impairment 2022    Non-compliance 2019    Simple chronic bronchitis ([facility] [address] 2019    Chronic rhinitis 2019    Recurrent depression ([facility] [address] 2018    Skull fracture ([facility] [address] 2014    Depression due to head injury 2014    Allergic arthritis involving hand 2014    Neurogenic pain 2014    Hearing loss of both ears 2013    Hyperlipidemia 2013    Back pain 2012    Chronic SI joint pain 2012    Chest pain, unspecified 2012    Hand pain 2012    Tobacco abuse 2012    HTN (hypertension) 2012    Tinnitus 2012       Past Medical History:   Diagnosis Date    Chronic bronchitis ([facility] [address]     H/O seasonal allergies     Hypertension        No Known Allergies    History reviewed. No pertinent surgical history. Social History     Socioeconomic History    Marital status: SINGLE   Tobacco Use    Smoking status: Every Day     Packs/day: 1.00     Types: Cigars, Cigarettes     Last attempt to quit: 10/6/2018     Years since quittin.0    Smokeless tobacco: Never   Substance and Sexual Activity    Alcohol use:  No Drug use: No     Social Determinants of Health     Financial Resource Strain: Low Risk     Difficulty of Paying Living Expenses: Not hard at all   Food Insecurity: No Food Insecurity    Worried About Running Out of Food in the Last Year: Never true    Ran Out of Food in the Last Year: Never true         Objective:     Review of Systems:  Constitutional: Negative for fatigue or malaise  Derm: Negative for rash or lesion  HEENT: Negative for acute hearing or vision changes  Cardiovascular: Negative for dizziness, chest pain or palpitations  Respiratory: Negative for productive cough and wheezing  Gastrointestinal: Negative for nausea or abdominal pain  Musculoskeletal: see HPI  Neurological: see HPI, Negative for weakness or paresthesia  Psychological: see HPI      Vitals:    11/02/22 0929 11/02/22 0958   BP: (!) 157/103 (!) 165/107   Pulse: 65    Resp: 20    Temp: 99 °F (37.2 °C)    TempSrc: Oral    SpO2: 97%    Weight: 225 lb 9.6 oz (102.3 kg)    Height: 5' 8\" (1.727 m)       Body mass index is 34.3 kg/m². Physical Exam:  Constitutional: well developed, well nourished, in no acute distress  Skin: warm and dry, normal tone and turgor  Head: normocephalic, atraumatic  Eyes: sclera clear, EOMI  Neck: normal range of motion  CV: normal S1, S2, regular rate and rhythm  Respiratory: clear with symmetrical effort  Extremities: full range of motion, antalgic gait  Neurology: normal strength and sensation  Psych: active, alert and oriented, affect appropriate       Assessment and orders:     Diagnoses and all orders for this visit:    1. Pain of left hand  -     diclofenac EC (VOLTAREN) 75 mg EC tablet; Take 1 Tablet by mouth two (2) times daily as needed for Pain. Take with food. Drink plenty of water. 2. Essential hypertension  -     losartan (COZAAR) 100 mg tablet; Take 1 Tablet by mouth daily. -     metoprolol tartrate (LOPRESSOR) 25 mg tablet; Take 1 Tablet by mouth daily.  Indications: high blood pressure  - METABOLIC PANEL, COMPREHENSIVE; Future  -     TSH 3RD GENERATION; Future    3. Hyperlipidemia, unspecified hyperlipidemia type  -     METABOLIC PANEL, COMPREHENSIVE; Future  -     LIPID PANEL; Future    4. Convulsions, unspecified convulsion type ([address]  -     CARBAMAZEPINE; Future    5. Environmental allergies  -     levocetirizine (XYZAL) 5 mg tablet; Take 1 Tablet by mouth daily. Plan of care:  Diagnoses were discussed in detail with patient. Strongly advised patient to stop all use of tobacco products. Medication risks/benefits/side effects discussed with patient. All of the patient's questions were addressed and answered to apparent satisfaction. The patient understands and agrees with our plan of care. The patient knows to call back if they have questions about the plan of care or if symptoms change. The patient received an After-Visit Summary which contains VS, diagnoses, orders, allergy and medication lists. Future Appointments   Date Time Provider Vanessa Gutierrez   5/2/2023 10:00 AM Manish Zarate MD [facility] BS AMB       Patient Care Team:  Manish Zarate MD as PCP - General (Family Medicine)  Manish Zarate MD as PCP - [address] [place] [address] Provider  Wade Cartagena MD (Rheumatology Internal Medicine)  Wilfredo More MD (Otolaryngology)  Nola Lesches, MD (Cardiovascular Disease Physician)  Mable Bull MD (Neurology)  Mable Bull MD (Neurology)    Follow-up and Dispositions    Return in about 6 months (around 5/2/2023), or if symptoms worsen or fail to improve. Future Appointments   Date Time Provider Vanessa Gutierrez   5/2/2023 10:00 AM Manish Zarate MD [facility] BS AMB       Signed By: Jesus Manuel Rivera MD     November 6, 2022        The following Annual Medicare Wellness Exam is distinct and separate from the medical evaluation and decision making.     This is the Subsequent Medicare Annual Wellness Exam, performed 12 months or more after the Initial AWV or the last Subsequent AWV    I have reviewed the patient's medical history in detail and updated the computerized patient record. Assessment/Plan   Education and counseling provided:  Are appropriate based on today's review and evaluation  End-of-Life planning (with patient's consent)    1. Subsequent Medicare Annual Wellness Exam       Depression Risk Factor Screening     3 most recent PHQ Screens 11/2/2022   Little interest or pleasure in doing things Not at all   Feeling down, depressed, irritable, or hopeless Not at all   Total Score PHQ 2 0       Alcohol & Drug Abuse Risk Screen    Do you average more than 1 drink per night or more than 7 drinks a week: No    In the past three months have you have had more than 4 drinks containing alcohol on one occasion: No          Functional Ability and Level of Safety    Hearing: The patient wears hearing aids. Activities of Daily Living: The home contains: no safety equipment. Patient needs help with:  transportation      Ambulation: with mild difficulty     Fall Risk:  Fall Risk Assessment, last 12 mths 11/2/2022   Able to walk? Yes   Fall in past 12 months? 1   Do you feel unsteady? 0   Are you worried about falling 0   Is the gait abnormal? 0   Number of falls in past 12 months 2   Fall with injury? 0      Abuse Screen:  Patient is not abused       Cognitive Screening    Has your family/caregiver stated any concerns about your memory: patient with memory difficulties post TBI     Not screened.     Health Maintenance Due     Health Maintenance Due   Topic Date Due    Colorectal Cancer Screening Combo  12/10/2015    Pneumococcal 65+ years (2 - PCV) 10/26/2018    COVID-19 Vaccine (4 - Booster for Moderna series) 02/01/2022    Flu Vaccine (1) 08/01/2022    Medicare Yearly Exam  11/04/2022       Patient Care Team   Patient Care Team:  Rosales May MD as PCP - General (Family Medicine)  Rosales May MD as PCP - [facility] Mariano Ernst Provider  Elsie Gatica MD (Rheumatology Internal Medicine)  Grace Carroll MD (Otolaryngology)  Josh Donahue MD (Cardiovascular Disease Physician)  Luis Angel Arroyo MD (Neurology)  Luis Angel Arroyo MD (Neurology)    History     Patient Active Problem List   Diagnosis Code    Hand pain U66.837    Tobacco abuse Z72.0    HTN (hypertension) I10    Tinnitus H93.19    Chest pain, unspecified R07.9    Back pain M54.9    Chronic SI joint pain M53.3, G89.29    Hyperlipidemia E78.5    Hearing loss of both ears H91.93    Neurogenic pain M79.2    Skull fracture ([facility] [address] S02. 91XA    Depression due to head injury F32. A, S09.90XA    Allergic arthritis involving hand M13.849    Recurrent depression (HCC) F33.9    Non-compliance Z91.199    Simple chronic bronchitis ([facility]) J41.0    Chronic rhinitis J31.0    Mild cognitive impairment G31.84     Past Medical History:   Diagnosis Date    Chronic bronchitis ([facility] [address]     H/O seasonal allergies     Hypertension       History reviewed. No pertinent surgical history. Current Outpatient Medications   Medication Sig Dispense Refill    levocetirizine (XYZAL) 5 mg tablet Take 1 Tablet by mouth daily. 30 Tablet 2    diclofenac EC (VOLTAREN) 75 mg EC tablet Take 1 Tablet by mouth two (2) times daily as needed for Pain. Take with food. Drink plenty of water. 60 Tablet 0    losartan (COZAAR) 100 mg tablet Take 1 Tablet by mouth daily. 90 Tablet 1    metoprolol tartrate (LOPRESSOR) 25 mg tablet Take 1 Tablet by mouth daily. Indications: high blood pressure 90 Tablet 1    carBAMazepine (TEGretol) 100 mg chewable tablet CHEW AND SWALLOW 1 TABLET BY MOUTH 3 TIMES DAILY 90 Tablet 0    pravastatin (PRAVACHOL) 10 mg tablet TAKE ONE TABLET BY MOUTH NIGHTLY 90 Tablet 0    busPIRone (BUSPAR) 7.5 mg tablet Take 1 Tablet by mouth two (2) times a day.  60 Tablet 2    FLUoxetine (PROzac) 40 mg capsule TAKE ONE CAPSULE BY MOUTH EVERY DAY 90 Capsule 0    omeprazole (PRILOSEC) 40 mg capsule TAKE ONE CAPSULE BY MOUTH EVERY DAY 90 Capsule 0    fluticasone propionate (FLONASE) 50 mcg/actuation nasal spray 2 Sprays by Both Nostrils route daily. 16 g 2    multivit-mins/iron/folic/lycop (CENTRUM MEN PO) Take 1 Tablet by mouth daily. diphenhydrAMINE (BENADRYL ALLERGY) 25 mg tablet Take 1 Tab by mouth every six (6) hours as needed (Allergies). 30 Tab 1    aspirin delayed-release 81 mg tablet Take  by mouth daily.        No Known Allergies    Family History   Family history unknown: Yes     Social History     Tobacco Use    Smoking status: Every Day     Packs/day: 1.00     Types: Cigars, Cigarettes     Last attempt to quit: 10/6/2018     Years since quittin.0    Smokeless tobacco: Never   Substance Use Topics    Alcohol use: No         Virginie Davidson MD

## 2022-12-07 ENCOUNTER — TELEPHONE (OUTPATIENT)
Dept: FAMILY MEDICINE CLINIC | Age: 67
End: 2022-12-07

## 2022-12-13 ENCOUNTER — OFFICE VISIT (OUTPATIENT)
Dept: FAMILY MEDICINE CLINIC | Age: 67
End: 2022-12-13
Payer: MEDICARE

## 2022-12-13 VITALS
WEIGHT: 225 LBS | DIASTOLIC BLOOD PRESSURE: 99 MMHG | TEMPERATURE: 98 F | HEIGHT: 68 IN | RESPIRATION RATE: 20 BRPM | BODY MASS INDEX: 34.1 KG/M2 | HEART RATE: 67 BPM | OXYGEN SATURATION: 96 % | SYSTOLIC BLOOD PRESSURE: 177 MMHG

## 2022-12-13 DIAGNOSIS — G89.4 CHRONIC PAIN SYNDROME: ICD-10-CM

## 2022-12-13 DIAGNOSIS — J06.9 UPPER RESPIRATORY TRACT INFECTION, UNSPECIFIED TYPE: Primary | ICD-10-CM

## 2022-12-13 DIAGNOSIS — Z02.83 ENCOUNTER FOR DRUG SCREENING: ICD-10-CM

## 2022-12-13 LAB
AMPHETAMINE QL URINE POC: NEGATIVE
BARBITURATES UR POC: NEGATIVE
BENZODIAZEPINES UR POC: NEGATIVE
COCAINE QL URINE POC: NEGATIVE
LOT EXP DATE POC: NORMAL
LOT NUMBER POC: NORMAL
MARIJUANA (THC) QL URINE POC: NEGATIVE
MDMA/ECSTASY UR POC: NEGATIVE
METHADONE QL URINE POC: NEGATIVE
METHAMPHETAMINE QL URINE POC: NEGATIVE
OPIATES 300 QL URINE POC: NEGATIVE
OXYCODONE UR POC: NEGATIVE
PHENCYCLIDINE QL URINE POC: NEGATIVE
QUICKVUE INFLUENZA TEST: NEGATIVE
TRICYCLIC ANTIDEPRESSANTS (TCA) QL URINE POC: NEGATIVE
VALID INTERNAL CONTROL?: YES

## 2022-12-13 NOTE — PROGRESS NOTES
[facility]    Assessment and Plan:  Diagnoses and all orders for this visit:    1. Upper respiratory tract infection, unspecified type: 4 days cough and runny nose. Does not meet criteria for COPD exacerbation -- holding off on antibitoics. Flu negative in office. COVID pending. Discussed symptomatic management. -     AMB POC RAPID INFLUENZA TEST  -     NOVEL CORONAVIRUS (COVID-19)    2. Encounter for drug screening  -     AMB POC USCREEN 12 DRUG     3. Chronic pain syndrome   -     AMB POC USCREEN 12 DRUG        Follow-up and Dispositions    Return if symptoms worsen or fail to improve. Subjective:  CC:   Chief Complaint   Patient presents with    Cough    Gum Problem       HPI:  Era Payer. is 74 y.o. male who presents today for cough and runny nose. Has been taking Zyrtec and Mucinex. Endorses subjective fever at home. Symptoms started 4 days ago. Denies increase in sputum production or purulence. Denies shortness of breath. Patient would like to have drug screen performed. Review of Systems   Eyes:  Negative for blurred vision and double vision. Respiratory:  Positive for cough. Negative for shortness of breath and wheezing. Cardiovascular:  Negative for chest pain. Neurological:  Negative for headaches. Past Medical History:   Diagnosis Date    Chronic bronchitis (HCC)     H/O seasonal allergies     Hypertension      Current Outpatient Medications on File Prior to Visit   Medication Sig Dispense Refill    busPIRone (BUSPAR) 7.5 mg tablet TAKE 1 TABLET BY MOUTH TWICE A  Tablet 0    carBAMazepine (TEGretol) 100 mg chewable tablet CHEW AND SWALLOW 1 TABLET BY MOUTH 3 TIMES DAILY 90 Tablet 1    levocetirizine (XYZAL) 5 mg tablet Take 1 Tablet by mouth daily. 30 Tablet 2    diclofenac EC (VOLTAREN) 75 mg EC tablet Take 1 Tablet by mouth two (2) times daily as needed for Pain. Take with food. Drink plenty of water.  60 Tablet 0    losartan (COZAAR) 100 mg tablet Take 1 Tablet by mouth daily. 90 Tablet 1    metoprolol tartrate (LOPRESSOR) 25 mg tablet Take 1 Tablet by mouth daily. Indications: high blood pressure 90 Tablet 1    pravastatin (PRAVACHOL) 10 mg tablet TAKE ONE TABLET BY MOUTH NIGHTLY 90 Tablet 0    FLUoxetine (PROzac) 40 mg capsule TAKE ONE CAPSULE BY MOUTH EVERY DAY 90 Capsule 0    omeprazole (PRILOSEC) 40 mg capsule TAKE ONE CAPSULE BY MOUTH EVERY DAY 90 Capsule 0    fluticasone propionate (FLONASE) 50 mcg/actuation nasal spray 2 Sprays by Both Nostrils route daily. 16 g 2    multivit-mins/iron/folic/lycop (CENTRUM MEN PO) Take 1 Tablet by mouth daily. diphenhydrAMINE (BENADRYL ALLERGY) 25 mg tablet Take 1 Tab by mouth every six (6) hours as needed (Allergies). 30 Tab 1    aspirin delayed-release 81 mg tablet Take  by mouth daily. No current facility-administered medications on file prior to visit. Health Maintenance Due   Topic Date Due    Colorectal Cancer Screening Combo  12/10/2015    Pneumococcal 65+ years (2 - PCV) 10/26/2018        Objective:    Vitals:    12/13/22 1310 12/13/22 1340   BP: (!) 163/96 (!) 177/99   Pulse: 69 67   Resp: 20    Temp: 98 °F (36.7 °C)    TempSrc: Oral    SpO2: 96%    Weight: 225 lb (102.1 kg)    Height: 5' 8\" (1.727 m)        Physical Exam  Vitals and nursing note reviewed. Constitutional:       General: He is not in acute distress. Appearance: Normal appearance. He is not ill-appearing. Cardiovascular:      Rate and Rhythm: Normal rate and regular rhythm. Pulmonary:      Effort: Pulmonary effort is normal.      Breath sounds: No wheezing, rhonchi or rales. Neurological:      Mental Status: He is alert.      Recent Results (from the past 12 hour(s))   AMB POC RAPID INFLUENZA TEST    Collection Time: 12/13/22  1:45 PM   Result Value Ref Range    VALID INTERNAL CONTROL POC Yes     QuickVue Influenza test Negative Negative   AMB POC USCREEN 12 DRUG     Collection Time: 12/13/22  1:46 PM   Result Value Ref Range    LOT NUMBER [id number]     LOT EXP DATE POC 01/13/2024     Marijuana(THC) urine , Ql. (POC) Negative     Cocaine urine , Ql. (POC) Negative     OPIATES 300 QL URINE POC Negative     Phencyclidine urine , Ql. (POC) Negative     OXYCODONE UR POC Negative     Tricyclic antidepressants (TCA), Ql. (POC) Negative     MDMA/ECSTASY UR POC Negative     Amphetamine urine , Ql. (POC) Negative     Methamphetamine urine , Ql. (POC) Negative     BARBITURATES UR POC Negative     BENZODIAZEPINES UR POC Negative     Methadone urine , Ql. (POC) Negative          Patient's care was discussed with Dr. Steve Penaloza. Dustin Omer DO  Family Medicine Resident    Patient's past medical history, including but not limited to problem list, allergies, medications, social history, family history, and surgical history reviewed. I have reviewed pertinent labs results and other data. We discussed the expected course, resolution and complications of the diagnosis(es) in detail. Medication risks, benefits, costs, interactions, and alternatives were discussed as indicated. I advised him to contact the office if his condition worsens, changes or fails to improve as anticipated. He expressed understanding with the diagnosis(es) and plan.

## 2022-12-13 NOTE — LETTER
12/13/2022 1:47 PM    Mr. Wali Simental.   [address] [facility]      Results for orders placed or performed in visit on 12/13/22   AMB POC RAPID INFLUENZA TEST   Result Value Ref Range    VALID INTERNAL CONTROL POC Yes     QuickVue Influenza test Negative Negative   AMB POC USCREEN 12 DRUG    Result Value Ref Range    LOT NUMBER [id number]     LOT EXP DATE POC 01/13/2024     Marijuana(THC) urine , Ql. (POC) Negative     Cocaine urine , Ql. (POC) Negative     OPIATES 300 QL URINE POC Negative     Phencyclidine urine , Ql. (POC) Negative     OXYCODONE UR POC Negative     Tricyclic antidepressants (TCA), Ql. (POC) Negative     MDMA/ECSTASY UR POC Negative     Amphetamine urine , Ql. (POC) Negative     Methamphetamine urine , Ql. (POC) Negative     BARBITURATES UR POC Negative     BENZODIAZEPINES UR POC Negative     Methadone urine , Ql. (POC) Negative      Sincerely,    Shy Arenas DO

## 2022-12-14 LAB — SARS-COV-2, NAA: NOT DETECTED

## 2022-12-16 ENCOUNTER — TELEPHONE (OUTPATIENT)
Dept: FAMILY MEDICINE CLINIC | Age: 67
End: 2022-12-16

## 2023-05-02 ENCOUNTER — OFFICE VISIT (OUTPATIENT)
Dept: FAMILY MEDICINE CLINIC | Age: 68
End: 2023-05-02

## 2023-05-02 VITALS
DIASTOLIC BLOOD PRESSURE: 105 MMHG | HEART RATE: 64 BPM | OXYGEN SATURATION: 96 % | HEIGHT: 68 IN | BODY MASS INDEX: 32.61 KG/M2 | WEIGHT: 215.2 LBS | TEMPERATURE: 97.2 F | SYSTOLIC BLOOD PRESSURE: 192 MMHG | RESPIRATION RATE: 18 BRPM

## 2023-05-02 DIAGNOSIS — J44.9 CHRONIC OBSTRUCTIVE PULMONARY DISEASE, UNSPECIFIED COPD TYPE (HCC): ICD-10-CM

## 2023-05-02 DIAGNOSIS — F33.9 RECURRENT DEPRESSION (HCC): ICD-10-CM

## 2023-05-02 DIAGNOSIS — R56.9 CONVULSIONS, UNSPECIFIED CONVULSION TYPE (HCC): ICD-10-CM

## 2023-05-02 DIAGNOSIS — E78.5 HYPERLIPIDEMIA, UNSPECIFIED HYPERLIPIDEMIA TYPE: ICD-10-CM

## 2023-05-02 DIAGNOSIS — I10 ESSENTIAL HYPERTENSION: ICD-10-CM

## 2023-05-02 DIAGNOSIS — M25.552 LEFT HIP PAIN: Primary | ICD-10-CM

## 2023-05-02 RX ORDER — KETOROLAC TROMETHAMINE 10 MG/1
10 TABLET, FILM COATED ORAL
Qty: 20 TABLET | Refills: 0 | Status: SHIPPED | OUTPATIENT
Start: 2023-05-02

## 2023-05-02 RX ORDER — DEXTROMETHORPHAN HYDROBROMIDE, GUAIFENESIN 5; 100 MG/5ML; MG/5ML
650 LIQUID ORAL 2 TIMES DAILY
COMMUNITY

## 2023-05-03 LAB
ALBUMIN SERPL-MCNC: 4.4 G/DL (ref 3.5–5)
ALBUMIN/GLOB SERPL: 1.5 (ref 1.1–2.2)
ALP SERPL-CCNC: 95 U/L (ref 45–117)
ALT SERPL-CCNC: 35 U/L (ref 12–78)
ANION GAP SERPL CALC-SCNC: 2 MMOL/L (ref 5–15)
AST SERPL-CCNC: 23 U/L (ref 15–37)
BILIRUB SERPL-MCNC: 0.5 MG/DL (ref 0.2–1)
BUN SERPL-MCNC: 17 MG/DL (ref 6–20)
BUN/CREAT SERPL: 18 (ref 12–20)
CALCIUM SERPL-MCNC: 8.8 MG/DL (ref 8.5–10.1)
CARBAMAZEPINE SERPL-MCNC: 6.6 UG/ML (ref 4–12)
CHLORIDE SERPL-SCNC: 106 MMOL/L (ref 97–108)
CHOLEST SERPL-MCNC: 188 MG/DL
CO2 SERPL-SCNC: 29 MMOL/L (ref 21–32)
CREAT SERPL-MCNC: 0.97 MG/DL (ref 0.7–1.3)
ERYTHROCYTE [DISTWIDTH] IN BLOOD BY AUTOMATED COUNT: 12.7 % (ref 11.5–14.5)
GLOBULIN SER CALC-MCNC: 2.9 G/DL (ref 2–4)
GLUCOSE SERPL-MCNC: 110 MG/DL (ref 65–100)
HCT VFR BLD AUTO: 48.9 % (ref 36.6–50.3)
HDLC SERPL-MCNC: 53 MG/DL
HDLC SERPL: 3.5 (ref 0–5)
HGB BLD-MCNC: 15.2 G/DL (ref 12.1–17)
LDLC SERPL CALC-MCNC: 105.6 MG/DL (ref 0–100)
MCH RBC QN AUTO: 31.8 PG (ref 26–34)
MCHC RBC AUTO-ENTMCNC: 31.1 G/DL (ref 30–36.5)
MCV RBC AUTO: 102.3 FL (ref 80–99)
NRBC # BLD: 0 K/UL (ref 0–0.01)
NRBC BLD-RTO: 0 PER 100 WBC
PLATELET # BLD AUTO: 246 K/UL (ref 150–400)
PMV BLD AUTO: 10.2 FL (ref 8.9–12.9)
POTASSIUM SERPL-SCNC: 5 MMOL/L (ref 3.5–5.1)
PROT SERPL-MCNC: 7.3 G/DL (ref 6.4–8.2)
RBC # BLD AUTO: 4.78 M/UL (ref 4.1–5.7)
SODIUM SERPL-SCNC: 137 MMOL/L (ref 136–145)
TRIGL SERPL-MCNC: 147 MG/DL (ref ?–150)
TSH SERPL DL<=0.05 MIU/L-ACNC: 0.66 UIU/ML (ref 0.36–3.74)
VLDLC SERPL CALC-MCNC: 29.4 MG/DL
WBC # BLD AUTO: 6.2 K/UL (ref 4.1–11.1)

## 2023-05-05 DIAGNOSIS — K21.9 GASTROESOPHAGEAL REFLUX DISEASE WITHOUT ESOPHAGITIS: ICD-10-CM

## 2023-05-05 DIAGNOSIS — I10 ESSENTIAL HYPERTENSION: ICD-10-CM

## 2023-05-06 RX ORDER — LOSARTAN POTASSIUM 100 MG/1
100 TABLET ORAL DAILY
Qty: 90 TABLET | Refills: 1 | Status: SHIPPED | OUTPATIENT
Start: 2023-05-06

## 2023-05-06 RX ORDER — OMEPRAZOLE 40 MG/1
CAPSULE, DELAYED RELEASE ORAL
Qty: 90 CAPSULE | Refills: 1 | Status: SHIPPED | OUTPATIENT
Start: 2023-05-06

## 2023-05-23 RX ORDER — ASPIRIN 81 MG/1
TABLET ORAL DAILY
COMMUNITY

## 2023-05-23 RX ORDER — FLUOXETINE HYDROCHLORIDE 40 MG/1
1 CAPSULE ORAL DAILY
COMMUNITY
Start: 2022-12-16 | End: 2023-06-06

## 2023-05-23 RX ORDER — SENNOSIDES 8.6 MG
650 CAPSULE ORAL 2 TIMES DAILY
COMMUNITY

## 2023-05-23 RX ORDER — CARBAMAZEPINE 100 MG/1
TABLET, CHEWABLE ORAL
COMMUNITY
Start: 2023-01-08 | End: 2023-06-03

## 2023-05-23 RX ORDER — LEVOCETIRIZINE DIHYDROCHLORIDE 5 MG/1
5 TABLET, FILM COATED ORAL DAILY
COMMUNITY
Start: 2022-11-03

## 2023-05-23 RX ORDER — BUSPIRONE HYDROCHLORIDE 7.5 MG/1
1 TABLET ORAL 2 TIMES DAILY
COMMUNITY
Start: 2022-12-11

## 2023-05-23 RX ORDER — PRAVASTATIN SODIUM 10 MG
1 TABLET ORAL NIGHTLY
COMMUNITY
Start: 2023-01-08 | End: 2023-07-06

## 2023-05-23 RX ORDER — LOSARTAN POTASSIUM 100 MG/1
100 TABLET ORAL DAILY
COMMUNITY
Start: 2023-05-06

## 2023-05-23 RX ORDER — OMEPRAZOLE 40 MG/1
1 CAPSULE, DELAYED RELEASE ORAL DAILY
COMMUNITY
Start: 2023-05-06

## 2023-05-23 RX ORDER — FLUTICASONE PROPIONATE 50 MCG
2 SPRAY, SUSPENSION (ML) NASAL DAILY
COMMUNITY
Start: 2022-04-08

## 2023-05-23 RX ORDER — KETOROLAC TROMETHAMINE 10 MG/1
10 TABLET, FILM COATED ORAL EVERY 6 HOURS PRN
COMMUNITY
Start: 2023-05-02

## 2023-06-03 RX ORDER — CARBAMAZEPINE 100 MG/1
TABLET, CHEWABLE ORAL
Qty: 90 TABLET | Refills: 0 | Status: SHIPPED | OUTPATIENT
Start: 2023-06-03 | End: 2023-07-19

## 2023-06-06 RX ORDER — FLUOXETINE HYDROCHLORIDE 40 MG/1
CAPSULE ORAL
Qty: 90 CAPSULE | Refills: 1 | Status: SHIPPED | OUTPATIENT
Start: 2023-06-06

## 2023-07-06 RX ORDER — PRAVASTATIN SODIUM 10 MG
TABLET ORAL
Qty: 90 TABLET | Refills: 1 | Status: SHIPPED | OUTPATIENT
Start: 2023-07-06

## 2023-07-19 RX ORDER — CARBAMAZEPINE 100 MG/1
TABLET, CHEWABLE ORAL
Qty: 90 TABLET | Refills: 2 | Status: SHIPPED | OUTPATIENT
Start: 2023-07-19

## 2023-10-16 RX ORDER — CARBAMAZEPINE 100 MG/1
TABLET, CHEWABLE ORAL
Qty: 90 TABLET | Refills: 2 | Status: SHIPPED | OUTPATIENT
Start: 2023-10-16

## 2023-10-23 ENCOUNTER — TELEPHONE (OUTPATIENT)
Facility: CLINIC | Age: 68
End: 2023-10-23

## 2023-10-23 RX ORDER — FLUOXETINE HYDROCHLORIDE 40 MG/1
40 CAPSULE ORAL DAILY
Qty: 90 CAPSULE | Refills: 1 | Status: SHIPPED | OUTPATIENT
Start: 2023-10-23

## 2023-10-23 NOTE — TELEPHONE ENCOUNTER
FLUoxetine (PROZAC) 40 MG capsule    Pt got his meds filled in June. He was helping to get a Pt from the hospital. In the meantime somewhere between there and traveling he lost his meds. Mohsen's want to know is there anyway he can get maybe enough to last until his next appointment on November 3rd. He is already feeling the affects of not having them. Feeling dizzy etc. Please call Lara Palmer at [facility].  Thanks

## 2023-10-27 DIAGNOSIS — K21.9 GASTRO-ESOPHAGEAL REFLUX DISEASE WITHOUT ESOPHAGITIS: ICD-10-CM

## 2023-10-27 DIAGNOSIS — I10 ESSENTIAL (PRIMARY) HYPERTENSION: ICD-10-CM

## 2023-10-29 RX ORDER — OMEPRAZOLE 40 MG/1
40 CAPSULE, DELAYED RELEASE ORAL DAILY
Qty: 90 CAPSULE | Refills: 1 | Status: SHIPPED | OUTPATIENT
Start: 2023-10-29

## 2023-10-29 RX ORDER — LOSARTAN POTASSIUM 100 MG/1
100 TABLET ORAL DAILY
Qty: 90 TABLET | Refills: 1 | Status: SHIPPED | OUTPATIENT
Start: 2023-10-29

## 2023-11-03 ENCOUNTER — OFFICE VISIT (OUTPATIENT)
Facility: CLINIC | Age: 68
End: 2023-11-03
Payer: MEDICARE

## 2023-11-03 VITALS
OXYGEN SATURATION: 97 % | TEMPERATURE: 98.3 F | SYSTOLIC BLOOD PRESSURE: 126 MMHG | DIASTOLIC BLOOD PRESSURE: 85 MMHG | WEIGHT: 197 LBS | HEART RATE: 80 BPM | BODY MASS INDEX: 29.86 KG/M2 | RESPIRATION RATE: 20 BRPM | HEIGHT: 68 IN

## 2023-11-03 DIAGNOSIS — I10 ESSENTIAL (PRIMARY) HYPERTENSION: Primary | ICD-10-CM

## 2023-11-03 DIAGNOSIS — Z87.891 PERSONAL HISTORY OF TOBACCO USE: ICD-10-CM

## 2023-11-03 DIAGNOSIS — R35.1 NOCTURIA: ICD-10-CM

## 2023-11-03 DIAGNOSIS — J44.9 CHRONIC OBSTRUCTIVE PULMONARY DISEASE, UNSPECIFIED COPD TYPE (HCC): ICD-10-CM

## 2023-11-03 DIAGNOSIS — E78.5 HYPERLIPIDEMIA, UNSPECIFIED HYPERLIPIDEMIA TYPE: ICD-10-CM

## 2023-11-03 DIAGNOSIS — Z00.00 MEDICARE ANNUAL WELLNESS VISIT, SUBSEQUENT: ICD-10-CM

## 2023-11-03 PROCEDURE — G8419 CALC BMI OUT NRM PARAM NOF/U: HCPCS | Performed by: FAMILY MEDICINE

## 2023-11-03 PROCEDURE — 99214 OFFICE O/P EST MOD 30 MIN: CPT | Performed by: FAMILY MEDICINE

## 2023-11-03 PROCEDURE — G8484 FLU IMMUNIZE NO ADMIN: HCPCS | Performed by: FAMILY MEDICINE

## 2023-11-03 PROCEDURE — G0439 PPPS, SUBSEQ VISIT: HCPCS | Performed by: FAMILY MEDICINE

## 2023-11-03 PROCEDURE — 4004F PT TOBACCO SCREEN RCVD TLK: CPT | Performed by: FAMILY MEDICINE

## 2023-11-03 PROCEDURE — 3078F DIAST BP <80 MM HG: CPT | Performed by: FAMILY MEDICINE

## 2023-11-03 PROCEDURE — G8427 DOCREV CUR MEDS BY ELIG CLIN: HCPCS | Performed by: FAMILY MEDICINE

## 2023-11-03 PROCEDURE — 3017F COLORECTAL CA SCREEN DOC REV: CPT | Performed by: FAMILY MEDICINE

## 2023-11-03 PROCEDURE — 3023F SPIROM DOC REV: CPT | Performed by: FAMILY MEDICINE

## 2023-11-03 PROCEDURE — G0296 VISIT TO DETERM LDCT ELIG: HCPCS | Performed by: FAMILY MEDICINE

## 2023-11-03 PROCEDURE — 3074F SYST BP LT 130 MM HG: CPT | Performed by: FAMILY MEDICINE

## 2023-11-03 PROCEDURE — 1123F ACP DISCUSS/DSCN MKR DOCD: CPT | Performed by: FAMILY MEDICINE

## 2023-11-03 SDOH — ECONOMIC STABILITY: INCOME INSECURITY: HOW HARD IS IT FOR YOU TO PAY FOR THE VERY BASICS LIKE FOOD, HOUSING, MEDICAL CARE, AND HEATING?: NOT HARD AT ALL

## 2023-11-03 SDOH — ECONOMIC STABILITY: FOOD INSECURITY: WITHIN THE PAST 12 MONTHS, YOU WORRIED THAT YOUR FOOD WOULD RUN OUT BEFORE YOU GOT MONEY TO BUY MORE.: NEVER TRUE

## 2023-11-03 SDOH — ECONOMIC STABILITY: HOUSING INSECURITY
IN THE LAST 12 MONTHS, WAS THERE A TIME WHEN YOU DID NOT HAVE A STEADY PLACE TO SLEEP OR SLEPT IN A SHELTER (INCLUDING NOW)?: NO

## 2023-11-03 SDOH — ECONOMIC STABILITY: FOOD INSECURITY: WITHIN THE PAST 12 MONTHS, THE FOOD YOU BOUGHT JUST DIDN'T LAST AND YOU DIDN'T HAVE MONEY TO GET MORE.: NEVER TRUE

## 2023-11-03 ASSESSMENT — LIFESTYLE VARIABLES
HOW OFTEN DO YOU HAVE A DRINK CONTAINING ALCOHOL: NEVER
HOW MANY STANDARD DRINKS CONTAINING ALCOHOL DO YOU HAVE ON A TYPICAL DAY: PATIENT DOES NOT DRINK

## 2023-11-03 ASSESSMENT — PATIENT HEALTH QUESTIONNAIRE - PHQ9
9. THOUGHTS THAT YOU WOULD BE BETTER OFF DEAD, OR OF HURTING YOURSELF: 0
7. TROUBLE CONCENTRATING ON THINGS, SUCH AS READING THE NEWSPAPER OR WATCHING TELEVISION: 2
6. FEELING BAD ABOUT YOURSELF - OR THAT YOU ARE A FAILURE OR HAVE LET YOURSELF OR YOUR FAMILY DOWN: 0
SUM OF ALL RESPONSES TO PHQ QUESTIONS 1-9: 5
3. TROUBLE FALLING OR STAYING ASLEEP: 2
SUM OF ALL RESPONSES TO PHQ QUESTIONS 1-9: 5
10. IF YOU CHECKED OFF ANY PROBLEMS, HOW DIFFICULT HAVE THESE PROBLEMS MADE IT FOR YOU TO DO YOUR WORK, TAKE CARE OF THINGS AT HOME, OR GET ALONG WITH OTHER PEOPLE: 0
1. LITTLE INTEREST OR PLEASURE IN DOING THINGS: 0
4. FEELING TIRED OR HAVING LITTLE ENERGY: 1
SUM OF ALL RESPONSES TO PHQ QUESTIONS 1-9: 5
SUM OF ALL RESPONSES TO PHQ QUESTIONS 1-9: 5
2. FEELING DOWN, DEPRESSED OR HOPELESS: 0
8. MOVING OR SPEAKING SO SLOWLY THAT OTHER PEOPLE COULD HAVE NOTICED. OR THE OPPOSITE, BEING SO FIGETY OR RESTLESS THAT YOU HAVE BEEN MOVING AROUND A LOT MORE THAN USUAL: 0
5. POOR APPETITE OR OVEREATING: 0
SUM OF ALL RESPONSES TO PHQ9 QUESTIONS 1 & 2: 0

## 2023-11-03 NOTE — PROGRESS NOTES
Progress Note    Patient: Efrain Willingham MRN: [id number]  SSN: [id number]    YOB: 1955  Age: 79 y.o. Sex: male        Chief Complaint   Patient presents with    Follow-up Chronic Condition    Medicare AW     he is a 79y.o. year old male who presents for follow up of his chronic health conditions. Patient with hx of skull fracture with resultant depression, neurogenic pain and hearing loss. He says his mood is stable with current dose of fluoxetine. He has paperwork to excuse him from jury duty. Patient denies HA, CP, dysuria, acute weakness or paresthesia. He continues to smoke. Encounter Diagnoses   Name Primary? Essential (primary) hypertension Yes    Hyperlipidemia, unspecified hyperlipidemia type     Chronic obstructive pulmonary disease, unspecified COPD type ([address])     Nocturia     Personal history of tobacco use     Medicare annual wellness visit, subsequent        Patient Active Problem List   Diagnosis    Neurogenic pain    Skull fracture (HCC)    Non-compliance    HTN (hypertension)    Chronic rhinitis    Recurrent depression (HCC)    Tinnitus    Tobacco abuse    Hearing loss of both ears    Chronic SI joint pain    Hand pain    Depression due to head injury    Allergic arthritis involving hand    Back pain    Chest pain, unspecified    Simple chronic bronchitis ([address])    Hyperlipidemia    Mild cognitive impairment    Convulsions, unspecified convulsion type ([address])     History reviewed. No pertinent surgical history.   Social History     Socioeconomic History    Marital status: Single     Spouse name: Not on file    Number of children: Not on file    Years of education: Not on file    Highest education level: Not on file   Occupational History    Not on file   Tobacco Use    Smoking status: Every Day     Packs/day: 1.00     Years: 40.00     Additional pack years: 0.00     Total pack years: 40.00     Types: Cigarettes     Last attempt to quit: 10/6/2018     Years since quittin.0

## 2023-11-04 LAB
ALBUMIN SERPL-MCNC: 4.2 G/DL (ref 3.5–5)
ALBUMIN/GLOB SERPL: 1.4 (ref 1.1–2.2)
ALP SERPL-CCNC: 121 U/L (ref 45–117)
ALT SERPL-CCNC: 21 U/L (ref 12–78)
ANION GAP SERPL CALC-SCNC: 4 MMOL/L (ref 5–15)
AST SERPL-CCNC: 17 U/L (ref 15–37)
BILIRUB SERPL-MCNC: 0.5 MG/DL (ref 0.2–1)
BUN SERPL-MCNC: 13 MG/DL (ref 6–20)
BUN/CREAT SERPL: 12 (ref 12–20)
CALCIUM SERPL-MCNC: 8.7 MG/DL (ref 8.5–10.1)
CHLORIDE SERPL-SCNC: 108 MMOL/L (ref 97–108)
CHOLEST SERPL-MCNC: 205 MG/DL
CO2 SERPL-SCNC: 29 MMOL/L (ref 21–32)
CREAT SERPL-MCNC: 1.05 MG/DL (ref 0.7–1.3)
GLOBULIN SER CALC-MCNC: 3 G/DL (ref 2–4)
GLUCOSE SERPL-MCNC: 82 MG/DL (ref 65–100)
HCT VFR BLD AUTO: 46.9 % (ref 36.6–50.3)
HDLC SERPL-MCNC: 54 MG/DL
HDLC SERPL: 3.8 (ref 0–5)
HGB BLD-MCNC: 15 G/DL (ref 12.1–17)
LDLC SERPL CALC-MCNC: 125.2 MG/DL (ref 0–100)
POTASSIUM SERPL-SCNC: 5.1 MMOL/L (ref 3.5–5.1)
PROT SERPL-MCNC: 7.2 G/DL (ref 6.4–8.2)
SODIUM SERPL-SCNC: 141 MMOL/L (ref 136–145)
TRIGL SERPL-MCNC: 129 MG/DL
TSH SERPL DL<=0.05 MIU/L-ACNC: 0.84 UIU/ML (ref 0.36–3.74)
VLDLC SERPL CALC-MCNC: 25.8 MG/DL

## 2023-11-06 LAB
PSA FREE MFR SERPL: 25.5 %
PSA FREE SERPL-MCNC: 0.28 NG/ML
PSA SERPL-MCNC: 1.1 NG/ML (ref 0–4)

## 2023-11-09 ENCOUNTER — TELEPHONE (OUTPATIENT)
Facility: CLINIC | Age: 68
End: 2023-11-09

## 2023-11-09 NOTE — TELEPHONE ENCOUNTER
----- Message from [facility] sent at 11/9/2023  1:22 PM EST -----  Subject: Referral Request    Reason for referral request? CT lung scan was supposed to be sent to   number below  Provider patient wants to be referred to(if known):     Provider Phone Number(if known):[phone]    Additional Information for Provider?   ---------------------------------------------------------------------------  --------------  Kassidy Hamilton Bennie    [phone]; OK to leave message on voicemail  ---------------------------------------------------------------------------  --------------

## 2023-12-19 RX ORDER — BUSPIRONE HYDROCHLORIDE 7.5 MG/1
7.5 TABLET ORAL 2 TIMES DAILY
Qty: 180 TABLET | Refills: 1 | Status: SHIPPED | OUTPATIENT
Start: 2023-12-19

## 2024-01-31 RX ORDER — CARBAMAZEPINE 100 MG/1
TABLET, CHEWABLE ORAL
Qty: 90 TABLET | Refills: 3 | Status: SHIPPED | OUTPATIENT
Start: 2024-01-31

## 2024-04-01 RX ORDER — PRAVASTATIN SODIUM 10 MG
TABLET ORAL
Qty: 90 TABLET | Refills: 0 | Status: SHIPPED | OUTPATIENT
Start: 2024-04-01

## 2024-04-11 RX ORDER — FLUOXETINE HYDROCHLORIDE 40 MG/1
40 CAPSULE ORAL DAILY
Qty: 90 CAPSULE | Refills: 0 | Status: SHIPPED | OUTPATIENT
Start: 2024-04-11

## 2024-04-29 DIAGNOSIS — I10 ESSENTIAL (PRIMARY) HYPERTENSION: ICD-10-CM

## 2024-04-29 DIAGNOSIS — K21.9 GASTRO-ESOPHAGEAL REFLUX DISEASE WITHOUT ESOPHAGITIS: ICD-10-CM

## 2024-05-01 ENCOUNTER — TELEPHONE (OUTPATIENT)
Facility: CLINIC | Age: 69
End: 2024-05-01

## 2024-05-01 RX ORDER — OMEPRAZOLE 40 MG/1
40 CAPSULE, DELAYED RELEASE ORAL DAILY
Qty: 90 CAPSULE | Refills: 1 | Status: SHIPPED | OUTPATIENT
Start: 2024-05-01

## 2024-05-01 RX ORDER — LOSARTAN POTASSIUM 100 MG/1
100 TABLET ORAL DAILY
Qty: 90 TABLET | Refills: 1 | Status: SHIPPED | OUTPATIENT
Start: 2024-05-01

## 2024-05-07 ENCOUNTER — OFFICE VISIT (OUTPATIENT)
Facility: CLINIC | Age: 69
End: 2024-05-07
Payer: MEDICARE

## 2024-05-07 VITALS
TEMPERATURE: 97.7 F | SYSTOLIC BLOOD PRESSURE: 138 MMHG | HEIGHT: 68 IN | BODY MASS INDEX: 30.16 KG/M2 | HEART RATE: 75 BPM | OXYGEN SATURATION: 97 % | WEIGHT: 199 LBS | DIASTOLIC BLOOD PRESSURE: 85 MMHG | RESPIRATION RATE: 14 BRPM

## 2024-05-07 DIAGNOSIS — R56.1 POST-TRAUMATIC SEIZURES (HCC): ICD-10-CM

## 2024-05-07 DIAGNOSIS — E78.5 HYPERLIPIDEMIA, UNSPECIFIED HYPERLIPIDEMIA TYPE: ICD-10-CM

## 2024-05-07 DIAGNOSIS — G47.09 OTHER INSOMNIA: ICD-10-CM

## 2024-05-07 DIAGNOSIS — I10 ESSENTIAL (PRIMARY) HYPERTENSION: Primary | ICD-10-CM

## 2024-05-07 DIAGNOSIS — Z72.0 TOBACCO ABUSE: ICD-10-CM

## 2024-05-07 DIAGNOSIS — J44.9 CHRONIC OBSTRUCTIVE PULMONARY DISEASE, UNSPECIFIED COPD TYPE (HCC): ICD-10-CM

## 2024-05-07 DIAGNOSIS — F33.9 RECURRENT MAJOR DEPRESSIVE DISORDER, REMISSION STATUS UNSPECIFIED (HCC): ICD-10-CM

## 2024-05-07 PROCEDURE — 99214 OFFICE O/P EST MOD 30 MIN: CPT | Performed by: FAMILY MEDICINE

## 2024-05-07 PROCEDURE — 3017F COLORECTAL CA SCREEN DOC REV: CPT | Performed by: FAMILY MEDICINE

## 2024-05-07 PROCEDURE — 4004F PT TOBACCO SCREEN RCVD TLK: CPT | Performed by: FAMILY MEDICINE

## 2024-05-07 PROCEDURE — G8427 DOCREV CUR MEDS BY ELIG CLIN: HCPCS | Performed by: FAMILY MEDICINE

## 2024-05-07 PROCEDURE — 1123F ACP DISCUSS/DSCN MKR DOCD: CPT | Performed by: FAMILY MEDICINE

## 2024-05-07 PROCEDURE — 3079F DIAST BP 80-89 MM HG: CPT | Performed by: FAMILY MEDICINE

## 2024-05-07 PROCEDURE — 3075F SYST BP GE 130 - 139MM HG: CPT | Performed by: FAMILY MEDICINE

## 2024-05-07 PROCEDURE — G8417 CALC BMI ABV UP PARAM F/U: HCPCS | Performed by: FAMILY MEDICINE

## 2024-05-07 PROCEDURE — 3023F SPIROM DOC REV: CPT | Performed by: FAMILY MEDICINE

## 2024-05-07 RX ORDER — TRAZODONE HYDROCHLORIDE 50 MG/1
50 TABLET ORAL NIGHTLY
Qty: 90 TABLET | Refills: 0
Start: 2024-05-07

## 2024-05-07 ASSESSMENT — ANXIETY QUESTIONNAIRES
4. TROUBLE RELAXING: NEARLY EVERY DAY
1. FEELING NERVOUS, ANXIOUS, OR ON EDGE: NEARLY EVERY DAY
5. BEING SO RESTLESS THAT IT IS HARD TO SIT STILL: MORE THAN HALF THE DAYS
IF YOU CHECKED OFF ANY PROBLEMS ON THIS QUESTIONNAIRE, HOW DIFFICULT HAVE THESE PROBLEMS MADE IT FOR YOU TO DO YOUR WORK, TAKE CARE OF THINGS AT HOME, OR GET ALONG WITH OTHER PEOPLE: VERY DIFFICULT
6. BECOMING EASILY ANNOYED OR IRRITABLE: NEARLY EVERY DAY
7. FEELING AFRAID AS IF SOMETHING AWFUL MIGHT HAPPEN: NOT AT ALL
2. NOT BEING ABLE TO STOP OR CONTROL WORRYING: NOT AT ALL
GAD7 TOTAL SCORE: 13
3. WORRYING TOO MUCH ABOUT DIFFERENT THINGS: MORE THAN HALF THE DAYS

## 2024-05-07 ASSESSMENT — PATIENT HEALTH QUESTIONNAIRE - PHQ9
9. THOUGHTS THAT YOU WOULD BE BETTER OFF DEAD, OR OF HURTING YOURSELF: NOT AT ALL
10. IF YOU CHECKED OFF ANY PROBLEMS, HOW DIFFICULT HAVE THESE PROBLEMS MADE IT FOR YOU TO DO YOUR WORK, TAKE CARE OF THINGS AT HOME, OR GET ALONG WITH OTHER PEOPLE: SOMEWHAT DIFFICULT
SUM OF ALL RESPONSES TO PHQ QUESTIONS 1-9: 16
5. POOR APPETITE OR OVEREATING: MORE THAN HALF THE DAYS
7. TROUBLE CONCENTRATING ON THINGS, SUCH AS READING THE NEWSPAPER OR WATCHING TELEVISION: NEARLY EVERY DAY
SUM OF ALL RESPONSES TO PHQ QUESTIONS 1-9: 16
SUM OF ALL RESPONSES TO PHQ QUESTIONS 1-9: 16
2. FEELING DOWN, DEPRESSED OR HOPELESS: NOT AT ALL
8. MOVING OR SPEAKING SO SLOWLY THAT OTHER PEOPLE COULD HAVE NOTICED. OR THE OPPOSITE, BEING SO FIGETY OR RESTLESS THAT YOU HAVE BEEN MOVING AROUND A LOT MORE THAN USUAL: MORE THAN HALF THE DAYS
3. TROUBLE FALLING OR STAYING ASLEEP: NEARLY EVERY DAY
4. FEELING TIRED OR HAVING LITTLE ENERGY: NEARLY EVERY DAY
6. FEELING BAD ABOUT YOURSELF - OR THAT YOU ARE A FAILURE OR HAVE LET YOURSELF OR YOUR FAMILY DOWN: NOT AT ALL
1. LITTLE INTEREST OR PLEASURE IN DOING THINGS: NEARLY EVERY DAY
SUM OF ALL RESPONSES TO PHQ9 QUESTIONS 1 & 2: 3
SUM OF ALL RESPONSES TO PHQ QUESTIONS 1-9: 16

## 2024-05-08 NOTE — PROGRESS NOTES
Chief Complaint   Patient presents with    6 Month Follow-Up     Head and eye pain/vision worsening          \"Have you been to the ER, urgent care clinic since your last visit?  Hospitalized since your last visit?\"    NO    “Have you seen or consulted any other health care providers outside of [facility] since your last visit?”    NO    “Have you had a colorectal cancer screening such as a colonoscopy/FIT/Cologuard?    NO    No colonoscopy on file  No cologuard on file  No FIT/FOBT on file   No flexible sigmoidoscopy on file            Health Maintenance Due   Topic Date Due    Colorectal Cancer Screen  Never done    Low dose CT lung screening &/or counseling  Never done    Respiratory Syncytial Virus (RSV) Pregnant or age 60 yrs+ (1 - 1-dose 60+ series) Never done    Pneumococcal 65+ years Vaccine (2 of 2 - PCV) 10/26/2018    Shingles vaccine (3 of 3) 03/19/2019    AAA screen  Never done       
aspirin 81 MG EC tablet Take by mouth daily    diphenhydrAMINE (SOMINEX) 25 MG tablet Take 1 tablet by mouth every 6 hours as needed    fluticasone (FLONASE) 50 MCG/ACT nasal spray 2 sprays by Nasal route daily    ketorolac (TORADOL) 10 MG tablet Take 1 tablet by mouth every 6 hours as needed    levocetirizine (XYZAL) 5 MG tablet Take 1 tablet by mouth daily     No current facility-administered medications for this visit.     No Known Allergies    Review of Systems:  Constitutional: Negative for fatigue, malaise  Resp: Negative for cough, wheezing or SOB  CV: Negative for chest pain, dizziness or palpitations  GI: Negative for nausea or abdominal pain  MS: Negative for acute myalgias or arthralgias   Neuro: Negative for HA, weakness or paresthesia  Psych: Negative for depression or anxiety     Vitals:    05/07/24 1339 05/07/24 1401   BP: (!) 175/80 138/85   Site: Left Upper Arm    Position: Sitting    Cuff Size: Large Adult    Pulse: 75    Resp: 14    Temp: 97.7 °F (36.5 °C)    TempSrc: Infrared    SpO2: 97%    Weight: 90.3 kg (199 lb)    Height: 1.727 m (5' 8\")        Physical Examination:  General: Well developed, well nourished, in no acute distress  Head: Normocephalic, atraumatic  Eyes: Sclera clear, EOMI  Neck: Normal range of motion  Respiratory: symmetrical, unlabored effort  Cardiovascular: Regular rate and rhythm  Extremities: Full range of motion, normal gait  Neurologic: No focal deficits  Psych: Active, alert and oriented. Affect appropriate      Diagnosis Orders   1. Essential (primary) hypertension  TSH    Comprehensive Metabolic Panel      2. Hyperlipidemia, unspecified hyperlipidemia type  Lipid Panel    Comprehensive Metabolic Panel      3. Chronic obstructive pulmonary disease, unspecified COPD type (HCC)  Hemoglobin and Hematocrit      4. Other insomnia  traZODone (DESYREL) 50 MG tablet      5. Post-traumatic seizures (HCC)        6. Recurrent major depressive disorder, remission status

## 2024-05-09 LAB
ALBUMIN SERPL-MCNC: 3.8 G/DL (ref 3.5–5)
ALBUMIN/GLOB SERPL: 1.4 (ref 1.1–2.2)
ALP SERPL-CCNC: 130 U/L (ref 45–117)
ALT SERPL-CCNC: 26 U/L (ref 12–78)
ANION GAP SERPL CALC-SCNC: 3 MMOL/L (ref 5–15)
AST SERPL-CCNC: 22 U/L (ref 15–37)
BILIRUB SERPL-MCNC: 0.6 MG/DL (ref 0.2–1)
BUN SERPL-MCNC: 15 MG/DL (ref 6–20)
BUN/CREAT SERPL: 15 (ref 12–20)
CALCIUM SERPL-MCNC: 8.9 MG/DL (ref 8.5–10.1)
CHLORIDE SERPL-SCNC: 106 MMOL/L (ref 97–108)
CHOLEST SERPL-MCNC: 173 MG/DL
CO2 SERPL-SCNC: 29 MMOL/L (ref 21–32)
CREAT SERPL-MCNC: 0.98 MG/DL (ref 0.7–1.3)
GLOBULIN SER CALC-MCNC: 2.8 G/DL (ref 2–4)
GLUCOSE SERPL-MCNC: 102 MG/DL (ref 65–100)
HCT VFR BLD AUTO: 44.8 % (ref 36.6–50.3)
HDLC SERPL-MCNC: 51 MG/DL
HDLC SERPL: 3.4 (ref 0–5)
HGB BLD-MCNC: 14.4 G/DL (ref 12.1–17)
LDLC SERPL CALC-MCNC: 99.6 MG/DL (ref 0–100)
POTASSIUM SERPL-SCNC: 4.5 MMOL/L (ref 3.5–5.1)
PROT SERPL-MCNC: 6.6 G/DL (ref 6.4–8.2)
SODIUM SERPL-SCNC: 138 MMOL/L (ref 136–145)
TRIGL SERPL-MCNC: 112 MG/DL
TSH SERPL DL<=0.05 MIU/L-ACNC: 0.87 UIU/ML (ref 0.36–3.74)
VLDLC SERPL CALC-MCNC: 22.4 MG/DL

## 2024-06-04 RX ORDER — CARBAMAZEPINE 100 MG/1
TABLET, CHEWABLE ORAL
Qty: 90 TABLET | Refills: 3 | Status: SHIPPED | OUTPATIENT
Start: 2024-06-04

## 2024-07-22 RX ORDER — FLUOXETINE 40 MG/1
40 CAPSULE ORAL DAILY
Qty: 90 CAPSULE | Refills: 1 | Status: SHIPPED | OUTPATIENT
Start: 2024-07-22

## 2024-07-29 RX ORDER — BUSPIRONE HYDROCHLORIDE 7.5 MG/1
7.5 TABLET ORAL 2 TIMES DAILY
Qty: 180 TABLET | Refills: 1 | Status: SHIPPED | OUTPATIENT
Start: 2024-07-29

## 2024-10-02 ENCOUNTER — COMMUNITY OUTREACH (OUTPATIENT)
Facility: CLINIC | Age: 69
End: 2024-10-02

## 2024-10-14 RX ORDER — CARBAMAZEPINE 100 MG/1
TABLET, CHEWABLE ORAL
Qty: 90 TABLET | Refills: 3 | Status: SHIPPED | OUTPATIENT
Start: 2024-10-14

## 2024-10-31 RX ORDER — PRAVASTATIN SODIUM 10 MG
TABLET ORAL
Qty: 90 TABLET | Refills: 0 | Status: SHIPPED | OUTPATIENT
Start: 2024-10-31

## 2024-11-03 DIAGNOSIS — I10 ESSENTIAL (PRIMARY) HYPERTENSION: ICD-10-CM

## 2024-11-03 DIAGNOSIS — K21.9 GASTRO-ESOPHAGEAL REFLUX DISEASE WITHOUT ESOPHAGITIS: ICD-10-CM

## 2024-11-04 RX ORDER — OMEPRAZOLE 40 MG/1
40 CAPSULE, DELAYED RELEASE ORAL DAILY
Qty: 90 CAPSULE | Refills: 0 | Status: SHIPPED | OUTPATIENT
Start: 2024-11-04

## 2024-11-04 RX ORDER — LOSARTAN POTASSIUM 100 MG/1
100 TABLET ORAL DAILY
Qty: 90 TABLET | Refills: 0 | Status: SHIPPED | OUTPATIENT
Start: 2024-11-04

## 2024-11-04 RX ORDER — METOPROLOL TARTRATE 25 MG/1
25 TABLET, FILM COATED ORAL DAILY
Qty: 90 TABLET | Refills: 0 | Status: SHIPPED | OUTPATIENT
Start: 2024-11-04

## 2024-11-08 ENCOUNTER — OFFICE VISIT (OUTPATIENT)
Facility: CLINIC | Age: 69
End: 2024-11-08

## 2024-11-08 VITALS
HEART RATE: 59 BPM | BODY MASS INDEX: 30.28 KG/M2 | OXYGEN SATURATION: 98 % | DIASTOLIC BLOOD PRESSURE: 105 MMHG | SYSTOLIC BLOOD PRESSURE: 161 MMHG | TEMPERATURE: 98.2 F | RESPIRATION RATE: 14 BRPM | WEIGHT: 199.8 LBS | HEIGHT: 68 IN

## 2024-11-08 DIAGNOSIS — Z87.891 PERSONAL HISTORY OF TOBACCO USE: ICD-10-CM

## 2024-11-08 DIAGNOSIS — Z00.00 MEDICARE ANNUAL WELLNESS VISIT, SUBSEQUENT: ICD-10-CM

## 2024-11-08 DIAGNOSIS — I10 ESSENTIAL (PRIMARY) HYPERTENSION: Primary | ICD-10-CM

## 2024-11-08 DIAGNOSIS — R56.1 POST-TRAUMATIC SEIZURES (HCC): ICD-10-CM

## 2024-11-08 DIAGNOSIS — E78.5 HYPERLIPIDEMIA, UNSPECIFIED HYPERLIPIDEMIA TYPE: ICD-10-CM

## 2024-11-08 DIAGNOSIS — J44.9 CHRONIC OBSTRUCTIVE PULMONARY DISEASE, UNSPECIFIED COPD TYPE (HCC): ICD-10-CM

## 2024-11-08 SDOH — ECONOMIC STABILITY: FOOD INSECURITY: WITHIN THE PAST 12 MONTHS, THE FOOD YOU BOUGHT JUST DIDN'T LAST AND YOU DIDN'T HAVE MONEY TO GET MORE.: NEVER TRUE

## 2024-11-08 SDOH — ECONOMIC STABILITY: INCOME INSECURITY: HOW HARD IS IT FOR YOU TO PAY FOR THE VERY BASICS LIKE FOOD, HOUSING, MEDICAL CARE, AND HEATING?: NOT HARD AT ALL

## 2024-11-08 SDOH — ECONOMIC STABILITY: FOOD INSECURITY: WITHIN THE PAST 12 MONTHS, YOU WORRIED THAT YOUR FOOD WOULD RUN OUT BEFORE YOU GOT MONEY TO BUY MORE.: NEVER TRUE

## 2024-11-08 ASSESSMENT — PATIENT HEALTH QUESTIONNAIRE - PHQ9
SUM OF ALL RESPONSES TO PHQ QUESTIONS 1-9: 4
SUM OF ALL RESPONSES TO PHQ9 QUESTIONS 1 & 2: 0
10. IF YOU CHECKED OFF ANY PROBLEMS, HOW DIFFICULT HAVE THESE PROBLEMS MADE IT FOR YOU TO DO YOUR WORK, TAKE CARE OF THINGS AT HOME, OR GET ALONG WITH OTHER PEOPLE: VERY DIFFICULT
4. FEELING TIRED OR HAVING LITTLE ENERGY: SEVERAL DAYS
7. TROUBLE CONCENTRATING ON THINGS, SUCH AS READING THE NEWSPAPER OR WATCHING TELEVISION: NOT AT ALL
3. TROUBLE FALLING OR STAYING ASLEEP: NEARLY EVERY DAY
9. THOUGHTS THAT YOU WOULD BE BETTER OFF DEAD, OR OF HURTING YOURSELF: NOT AT ALL
8. MOVING OR SPEAKING SO SLOWLY THAT OTHER PEOPLE COULD HAVE NOTICED. OR THE OPPOSITE, BEING SO FIGETY OR RESTLESS THAT YOU HAVE BEEN MOVING AROUND A LOT MORE THAN USUAL: NOT AT ALL
SUM OF ALL RESPONSES TO PHQ QUESTIONS 1-9: 4
6. FEELING BAD ABOUT YOURSELF - OR THAT YOU ARE A FAILURE OR HAVE LET YOURSELF OR YOUR FAMILY DOWN: NOT AT ALL
1. LITTLE INTEREST OR PLEASURE IN DOING THINGS: NOT AT ALL
SUM OF ALL RESPONSES TO PHQ QUESTIONS 1-9: 4
5. POOR APPETITE OR OVEREATING: NOT AT ALL
2. FEELING DOWN, DEPRESSED OR HOPELESS: NOT AT ALL
SUM OF ALL RESPONSES TO PHQ QUESTIONS 1-9: 4

## 2024-11-08 ASSESSMENT — LIFESTYLE VARIABLES
HOW MANY STANDARD DRINKS CONTAINING ALCOHOL DO YOU HAVE ON A TYPICAL DAY: PATIENT DOES NOT DRINK
HOW OFTEN DO YOU HAVE A DRINK CONTAINING ALCOHOL: NEVER

## 2024-11-08 NOTE — PROGRESS NOTES
Chief Complaint   Patient presents with    Medicare AWV    6 Month Follow-Up        \"Have you been to the ER, urgent care clinic since your last visit?  Hospitalized since your last visit?\"    NO    “Have you seen or consulted any other health care providers outside of [facility] since your last visit?”    NO        “Have you had a colorectal cancer screening such as a colonoscopy/FIT/Cologuard?    NO    No colonoscopy on file  No cologuard on file  No FIT/FOBT on file   No flexible sigmoidoscopy on file         Click Here for Release of Records Request     Health Maintenance Due   Topic Date Due    Diabetes screen  Never done    Colorectal Cancer Screen  Never done    Lung Cancer Screening &/or Counseling  Never done    Respiratory Syncytial Virus (RSV) Pregnant or age 60 yrs+ (1 - 1-dose 60+ series) Never done    Pneumococcal 65+ years Vaccine (2 of 2 - PCV) 10/26/2018    Shingles vaccine (3 of 3) 03/19/2019    AAA screen  Never done    Flu vaccine (1) 08/01/2024    COVID-19 Vaccine (7 - 2023-24 season) 09/01/2024    Annual Wellness Visit (Medicare)  11/03/2024       
  No Known Allergies  Prior to Visit Medications    Medication Sig Taking? Authorizing Provider   metoprolol tartrate (LOPRESSOR) 25 MG tablet TAKE ONE TABLET BY MOUTH EVERY DAY Yes Jonnie Merlos MD   omeprazole (PRILOSEC) 40 MG delayed release capsule TAKE ONE CAPSULE BY MOUTH EVERY DAY Yes Jonnie Merlos MD   losartan (COZAAR) 100 MG tablet TAKE ONE TABLET BY MOUTH EVERY DAY Yes Jonnie Merlos MD   pravastatin (PRAVACHOL) 10 MG tablet TAKE ONE TABLET BY MOUTH NIGHTLY Yes Jonnie Merlos MD   carBAMazepine (TEGRETOL) 100 MG chewable tablet CHEW AND SWALLOW 1 TABLET BY MOUTH 3 TIMES DAILY Yes Jonnie Merlos MD   busPIRone (BUSPAR) 7.5 MG tablet TAKE 1 TABLET BY MOUTH TWICE A DAY Yes Jonnie Merlos MD   FLUoxetine (PROZAC) 40 MG capsule TAKE ONE CAPSULE BY MOUTH EVERY DAY Yes Jonnie Merlos MD   traZODone (DESYREL) 50 MG tablet Take 1 tablet by mouth nightly Yes Jonnie Merlos MD   acetaminophen (TYLENOL) 650 MG extended release tablet Take 1 tablet by mouth 2 times daily Yes Automatic Reconciliation, Ar   aspirin 81 MG EC tablet Take by mouth daily Yes Automatic Reconciliation, Ar   diphenhydrAMINE (SOMINEX) 25 MG tablet Take 1 tablet by mouth every 6 hours as needed Yes Automatic Reconciliation, Ar   fluticasone (FLONASE) 50 MCG/ACT nasal spray 2 sprays by Nasal route daily Yes Automatic Reconciliation, Ar   ketorolac (TORADOL) 10 MG tablet Take 1 tablet by mouth every 6 hours as needed Yes Automatic Reconciliation, Ar   levocetirizine (XYZAL) 5 MG tablet Take 1 tablet by mouth daily Yes Automatic Reconciliation, Ar       CareTeam (Including outside providers/suppliers regularly involved in providing care):   Patient Care Team:  Jonnie Merlos MD as PCP - General  Jonnie Merlos MD as PCP - Empaneled Provider      Reviewed and updated this visit:  Tobacco  Allergies  Meds  Problems  Med Hx  Surg Hx  Soc Hx  Fam Hx

## 2024-11-08 NOTE — PATIENT INSTRUCTIONS
ask your healthcare professional. Healthwise, [facility] disclaims any warranty or liability for your use of this information.           Starting a Weight Loss Plan: Care Instructions  Overview     It can be a challenge to lose weight. But your doctor can help you make a weight-loss plan that meets your needs.  You don't have to make a lot of big changes at once. A better idea might be to focus on small changes and stick with them. When those changes become habit, you can add a few more changes.  Some people find it helpful to take an exercise or nutrition class. If you have questions, ask your doctor about seeing a registered dietitian or an exercise specialist. You might also think about joining a weight-loss support group.  If you're not ready to make changes right now, try to pick a date in the future. Then make an appointment with your doctor to talk about when and how you'll get started with a plan.  Follow-up care is a key part of your treatment and safety. Be sure to make and go to all appointments, and call your doctor if you are having problems. It's also a good idea to know your test results and keep a list of the medicines you take.  How can you care for yourself at home?  Set realistic goals. Many people expect to lose much more weight than is likely. A weight loss of 5% to 10% of your body weight may be enough to improve your health.  Get family and friends involved to provide support. Talk to them about why you are trying to lose weight, and ask them to help. They can help by participating in exercise and having meals with you, even if they may be eating something different.  Find what works best for you. If you do not have time or do not like to cook, a program that offers meal replacement bars or shakes may be better for you. Or if you like to prepare meals, finding a plan that includes daily menus and recipes may be best.  Ask your doctor about other health professionals who can help you achieve

## 2024-11-09 LAB
ALBUMIN SERPL-MCNC: 4 G/DL (ref 3.5–5)
ALBUMIN/GLOB SERPL: 1.4 (ref 1.1–2.2)
ALP SERPL-CCNC: 137 U/L (ref 45–117)
ALT SERPL-CCNC: 39 U/L (ref 12–78)
ANION GAP SERPL CALC-SCNC: 3 MMOL/L (ref 2–12)
AST SERPL-CCNC: 26 U/L (ref 15–37)
BILIRUB SERPL-MCNC: 0.5 MG/DL (ref 0.2–1)
BUN SERPL-MCNC: 17 MG/DL (ref 6–20)
BUN/CREAT SERPL: 18 (ref 12–20)
CALCIUM SERPL-MCNC: 9 MG/DL (ref 8.5–10.1)
CARBAMAZEPINE SERPL-MCNC: 6.8 UG/ML (ref 4–12)
CHLORIDE SERPL-SCNC: 106 MMOL/L (ref 97–108)
CHOLEST SERPL-MCNC: 191 MG/DL
CO2 SERPL-SCNC: 29 MMOL/L (ref 21–32)
CREAT SERPL-MCNC: 0.97 MG/DL (ref 0.7–1.3)
ERYTHROCYTE [DISTWIDTH] IN BLOOD BY AUTOMATED COUNT: 12.8 % (ref 11.5–14.5)
GLOBULIN SER CALC-MCNC: 2.9 G/DL (ref 2–4)
GLUCOSE SERPL-MCNC: 100 MG/DL (ref 65–100)
HCT VFR BLD AUTO: 43.3 % (ref 36.6–50.3)
HDLC SERPL-MCNC: 49 MG/DL
HDLC SERPL: 3.9 (ref 0–5)
HGB BLD-MCNC: 14 G/DL (ref 12.1–17)
LDLC SERPL CALC-MCNC: 115 MG/DL (ref 0–100)
MCH RBC QN AUTO: 32 PG (ref 26–34)
MCHC RBC AUTO-ENTMCNC: 32.3 G/DL (ref 30–36.5)
MCV RBC AUTO: 99.1 FL (ref 80–99)
NRBC # BLD: 0 K/UL (ref 0–0.01)
NRBC BLD-RTO: 0 PER 100 WBC
PLATELET # BLD AUTO: 216 K/UL (ref 150–400)
PMV BLD AUTO: 9.7 FL (ref 8.9–12.9)
POTASSIUM SERPL-SCNC: 4.8 MMOL/L (ref 3.5–5.1)
PROT SERPL-MCNC: 6.9 G/DL (ref 6.4–8.2)
RBC # BLD AUTO: 4.37 M/UL (ref 4.1–5.7)
SODIUM SERPL-SCNC: 138 MMOL/L (ref 136–145)
TRIGL SERPL-MCNC: 135 MG/DL
TSH SERPL DL<=0.05 MIU/L-ACNC: 0.94 UIU/ML (ref 0.36–3.74)
VLDLC SERPL CALC-MCNC: 27 MG/DL
WBC # BLD AUTO: 5.9 K/UL (ref 4.1–11.1)

## 2025-01-06 RX ORDER — FLUOXETINE 40 MG/1
40 CAPSULE ORAL DAILY
Qty: 90 CAPSULE | Refills: 1 | Status: SHIPPED | OUTPATIENT
Start: 2025-01-06

## 2025-01-31 RX ORDER — PRAVASTATIN SODIUM 10 MG
TABLET ORAL
Qty: 90 TABLET | Refills: 1 | Status: SHIPPED | OUTPATIENT
Start: 2025-01-31

## 2025-02-10 DIAGNOSIS — K21.9 GASTRO-ESOPHAGEAL REFLUX DISEASE WITHOUT ESOPHAGITIS: ICD-10-CM

## 2025-02-10 DIAGNOSIS — I10 ESSENTIAL (PRIMARY) HYPERTENSION: ICD-10-CM

## 2025-02-10 RX ORDER — METOPROLOL TARTRATE 25 MG/1
25 TABLET, FILM COATED ORAL DAILY
Qty: 90 TABLET | Refills: 1 | Status: SHIPPED | OUTPATIENT
Start: 2025-02-10

## 2025-02-10 RX ORDER — OMEPRAZOLE 40 MG/1
40 CAPSULE, DELAYED RELEASE ORAL DAILY
Qty: 90 CAPSULE | Refills: 1 | Status: SHIPPED | OUTPATIENT
Start: 2025-02-10

## 2025-02-10 RX ORDER — LOSARTAN POTASSIUM 100 MG/1
100 TABLET ORAL DAILY
Qty: 90 TABLET | Refills: 1 | Status: SHIPPED | OUTPATIENT
Start: 2025-02-10

## 2025-02-17 RX ORDER — CARBAMAZEPINE 100 MG/1
TABLET, CHEWABLE ORAL
Qty: 90 TABLET | Refills: 3 | Status: SHIPPED | OUTPATIENT
Start: 2025-02-17

## 2025-03-18 NOTE — PATIENT INSTRUCTIONS
Home Blood Pressure Test: About This Test 
What is it? A home blood pressure test allows you to keep track of your blood pressure at home. Blood pressure is a measure of the force of blood against the walls of your arteries. Blood pressure readings include two numbers, such as 130/80 (say \"130 over 80\"). The first number is the systolic pressure. The second number is the diastolic pressure. Why is this test done? You may do this test at home to: · Find out if you have high blood pressure. · Track your blood pressure if you have high blood pressure. · Track how well medicine is working to reduce high blood pressure. · Check how lifestyle changes, such as weight loss and exercise, are affecting blood pressure. How can you prepare for the test? 
· Do not use caffeine, tobacco, or medicines known to raise blood pressure (such as nasal decongestant sprays) for at least 30 minutes before taking your blood pressure. · Do not exercise for at least 30 minutes before taking your blood pressure. What happens before the test? 
Take your blood pressure while you feel comfortable and relaxed. Sit quietly with both feet on the floor for at least 5 minutes before the test. 
What happens during the test? 
· Sit with your arm slightly bent and resting on a table so that your upper arm is at the same level as your heart. · Roll up your sleeve or take off your shirt to expose your upper arm. · Wrap the blood pressure cuff around your upper arm so that the lower edge of the cuff is about 1 inch above the bend of your elbow. Proceed with the following steps depending on if you are using an automatic or manual pressure monitor. Automatic blood pressure monitors · Press the on/off button on the automatic monitor and wait until the ready-to-measure \"heart\" symbol appears next to zero in the display window. · Press the start button. The cuff will inflate and deflate by itself. ST Evaluation orders received and chart reviewed.  Pt remains NPO for ongoing medical work-up/imaging, biopsy.  Cognitive deficits persist.  Son at bedside and updated on POC. ST will return in pm for assessment as ordered.    13:30--ST re-attempted assessment; however, family reported pt to remain NPO and still waiting on further medical management.  ST will return in a.m. for evaluation as previously ordered.   · Your blood pressure numbers will appear on the screen. · Write your numbers in your log book, along with the date and time. Manual blood pressure monitors · Place the earpieces of a stethoscope in your ears, and place the bell of the stethoscope over the artery, just below the cuff. · Close the valve on the rubber inflating bulb. · Squeeze the bulb rapidly with your opposite hand to inflate the cuff until the dial or column of mercury reads about 30 mm Hg higher than your usual systolic pressure. If you do not know your usual pressure, inflate the cuff to 210 mm Hg or until the pulse at your wrist disappears. · Open the pressure valve just slightly by twisting or pressing the valve on the bulb. · As you watch the pressure slowly fall, note the level on the dial at which you first start to hear a pulsing or tapping sound through the stethoscope. This is your systolic blood pressure. · Continue letting the air out slowly. The sounds will become muffled and will finally disappear. Note the pressure when the sounds completely disappear. This is your diastolic blood pressure. Let out all the remaining air. · Write your numbers in your log book, along with the date and time. What else should you know about the test? 
Here are the categories of blood pressure for adults: 
Ideal blood pressure. Systolic is less than 817, and diastolic is less than 80. Elevated blood pressure. Systolic is 328 to 243, and diastolic is less than 80. High blood pressure (hypertension). Systolic is 470 or above. Diastolic is 80 or above. One or both numbers may be high. It is more accurate to take the average of several readings made throughout the day than to rely on a single reading. Follow-up care is a key part of your treatment and safety. Be sure to make and go to all appointments, and call your doctor if you are having problems. It's also a good idea to keep a list of the medicines you take. Where can you learn more? Go to http://jonatan-donna.info/. Enter C427 in the search box to learn more about \"Home Blood Pressure Test: About This Test.\" Current as of: 2017 Content Version: 11.7 © 2988-7366 [employer]. Care instructions adapted under license by [employer] (which disclaims liability or warranty for this information). If you have questions about a medical condition or this instruction, always ask your healthcare professional. [address] any warranty or liability for your use of this information. [address] Affiliated with [address] [place] [address], [facility] [address] [place], [address] [place] [address] 
[phone] Monitor blood pressure outside the office several times weekly at different times during the day and evening. Bring the record to me in 3 weeks for review. Blood Pressure Record Patient Name:  ______________________ :  ______________________ Date/Time BP Reading Pulse Learning About Benefits From Quitting Smoking How does quitting smoking make you healthier? If you're thinking about quitting smoking, you may have a few reasons to be smoke-free. Your health may be one of them. · When you quit smoking, you lower your risks for cancer, lung disease, heart attack, stroke, blood vessel disease, and blindness from macular degeneration. · When you're smoke-free, you get sick less often, and you heal faster. You are less likely to get colds, flu, bronchitis, and pneumonia. · As a nonsmoker, you may find that your mood is better and you are less stressed. When and how will you feel healthier?  
Quitting has real health benefits that start from day 1 of being smoke-free. And the longer you stay smoke-free, the healthier you get and the better you feel. The first hours · After just 20 minutes, your blood pressure and heart rate go down. That means there's less stress on your heart and blood vessels. · Within 12 hours, the level of carbon monoxide in your blood drops back to normal. That makes room for more oxygen. With more oxygen in your body, you may notice that you have more energy than when you smoked. After 2 weeks · Your lungs start to work better. · Your risk of heart attack starts to drop. After 1 month · When your lungs are clear, you cough less and breathe deeper, so it's easier to be active. · Your sense of taste and smell return. That means you can enjoy food more than you have since you started smoking. Over the years · After 1 year, your risk of heart disease is half what it would be if you kept smoking. · After 5 years, your risk of stroke starts to shrink. Within a few years after that, it's about the same as if you'd never smoked. · After 10 years, your risk of dying from lung cancer is cut by about half. And your risk for many other types of cancer is lower too. How would quitting help others in your life? When you quit smoking, you improve the health of everyone who now breathes in your smoke. · Their heart, lung, and cancer risks drop, much like yours. · They are sick less. For babies and small children, living smoke-free means they're less likely to have ear infections, pneumonia, and bronchitis. · If you're a woman who is or will be pregnant someday, quitting smoking means a healthier . · Children who are close to you are less likely to become adult smokers. Where can you learn more? Go to http://jonatan-donna.info/. Enter 052 [id number] 72 11 in the search box to learn more about \"Learning About Benefits From Quitting Smoking. \" Current as of: 2017 Content Version: 117 © 7925-9644 Healthwise[employer] Inc[employer]. Care instructions adapted under license by [employer] (which disclaims liability or warranty for this information). If you have questions about a medical condition or this instruction, always ask your healthcare professional. [address] any warranty or liability for your use of this information.

## 2025-04-27 ENCOUNTER — HOSPITAL ENCOUNTER (EMERGENCY)
Facility: HOSPITAL | Age: 70
Discharge: HOME | End: 2025-04-27
Attending: STUDENT IN AN ORGANIZED HEALTH CARE EDUCATION/TRAINING PROGRAM
Payer: MEDICARE

## 2025-04-27 ENCOUNTER — APPOINTMENT (OUTPATIENT)
Facility: HOSPITAL | Age: 70
End: 2025-04-27
Payer: MEDICARE

## 2025-04-27 VITALS
RESPIRATION RATE: 18 BRPM | HEART RATE: 63 BPM | SYSTOLIC BLOOD PRESSURE: 169 MMHG | HEIGHT: 68 IN | TEMPERATURE: 98 F | BODY MASS INDEX: 30.31 KG/M2 | WEIGHT: 200 LBS | DIASTOLIC BLOOD PRESSURE: 79 MMHG | OXYGEN SATURATION: 100 %

## 2025-04-27 DIAGNOSIS — S01.01XA LACERATION OF SCALP, INITIAL ENCOUNTER: Primary | ICD-10-CM

## 2025-04-27 LAB
ALBUMIN SERPL-MCNC: 3.7 G/DL (ref 3.5–5)
ALBUMIN/GLOB SERPL: 1.1 (ref 1.1–2.2)
ALP SERPL-CCNC: 132 U/L (ref 45–117)
ALT SERPL-CCNC: 23 U/L (ref 12–78)
ANION GAP SERPL CALC-SCNC: 5 MMOL/L (ref 2–12)
AST SERPL W P-5'-P-CCNC: 21 U/L (ref 15–37)
BASOPHILS # BLD: 0.02 K/UL (ref 0–0.1)
BASOPHILS NFR BLD: 0.3 % (ref 0–1)
BILIRUB SERPL-MCNC: 0.5 MG/DL (ref 0.2–1)
BUN SERPL-MCNC: 18 MG/DL (ref 6–20)
BUN/CREAT SERPL: 19 (ref 12–20)
CA-I BLD-MCNC: 8.6 MG/DL (ref 8.5–10.1)
CHLORIDE SERPL-SCNC: 110 MMOL/L (ref 97–108)
CO2 SERPL-SCNC: 28 MMOL/L (ref 21–32)
CREAT SERPL-MCNC: 0.97 MG/DL (ref 0.7–1.3)
DIFFERENTIAL METHOD BLD: NORMAL
EOSINOPHIL # BLD: 0.13 K/UL (ref 0–0.4)
EOSINOPHIL NFR BLD: 2 % (ref 0–7)
ERYTHROCYTE [DISTWIDTH] IN BLOOD BY AUTOMATED COUNT: 12.5 % (ref 11.5–14.5)
GLOBULIN SER CALC-MCNC: 3.3 G/DL (ref 2–4)
GLUCOSE SERPL-MCNC: 89 MG/DL (ref 65–100)
HCT VFR BLD AUTO: 43 % (ref 36.6–50.3)
HGB BLD-MCNC: 14.1 G/DL (ref 12.1–17)
IMM GRANULOCYTES # BLD AUTO: 0.02 K/UL (ref 0–0.04)
IMM GRANULOCYTES NFR BLD AUTO: 0.3 % (ref 0–0.5)
LYMPHOCYTES # BLD: 1.47 K/UL (ref 0.8–3.5)
LYMPHOCYTES NFR BLD: 23.1 % (ref 12–49)
MCH RBC QN AUTO: 32 PG (ref 26–34)
MCHC RBC AUTO-ENTMCNC: 32.8 G/DL (ref 30–36.5)
MCV RBC AUTO: 97.7 FL (ref 80–99)
MONOCYTES # BLD: 0.51 K/UL (ref 0–1)
MONOCYTES NFR BLD: 8 % (ref 5–13)
NEUTS SEG # BLD: 4.21 K/UL (ref 1.8–8)
NEUTS SEG NFR BLD: 66.3 % (ref 32–75)
NRBC # BLD: 0 K/UL (ref 0–0.01)
NRBC BLD-RTO: 0 PER 100 WBC
PLATELET # BLD AUTO: 179 K/UL (ref 150–400)
PMV BLD AUTO: 9.8 FL (ref 8.9–12.9)
POTASSIUM SERPL-SCNC: 3.8 MMOL/L (ref 3.5–5.1)
PROT SERPL-MCNC: 7 G/DL (ref 6.4–8.2)
RBC # BLD AUTO: 4.4 M/UL (ref 4.1–5.7)
SODIUM SERPL-SCNC: 143 MMOL/L (ref 136–145)
WBC # BLD AUTO: 6.4 K/UL (ref 4.1–11.1)

## 2025-04-27 PROCEDURE — 6370000000 HC RX 637 (ALT 250 FOR IP): Performed by: STUDENT IN AN ORGANIZED HEALTH CARE EDUCATION/TRAINING PROGRAM

## 2025-04-27 PROCEDURE — 99284 EMERGENCY DEPT VISIT MOD MDM: CPT

## 2025-04-27 PROCEDURE — 93005 ELECTROCARDIOGRAM TRACING: CPT | Performed by: STUDENT IN AN ORGANIZED HEALTH CARE EDUCATION/TRAINING PROGRAM

## 2025-04-27 PROCEDURE — 6830039000 HC L3 TRAUMA ALERT

## 2025-04-27 PROCEDURE — 80053 COMPREHEN METABOLIC PANEL: CPT

## 2025-04-27 PROCEDURE — 85025 COMPLETE CBC W/AUTO DIFF WBC: CPT

## 2025-04-27 PROCEDURE — 70450 CT HEAD/BRAIN W/O DYE: CPT

## 2025-04-27 PROCEDURE — 12002 RPR S/N/AX/GEN/TRNK2.6-7.5CM: CPT

## 2025-04-27 PROCEDURE — 72125 CT NECK SPINE W/O DYE: CPT

## 2025-04-27 PROCEDURE — 99285 EMERGENCY DEPT VISIT HI MDM: CPT

## 2025-04-27 RX ORDER — ACETAMINOPHEN 500 MG
1000 TABLET ORAL
Status: COMPLETED | OUTPATIENT
Start: 2025-04-27 | End: 2025-04-27

## 2025-04-27 RX ADMIN — ACETAMINOPHEN 1000 MG: 500 TABLET ORAL at 12:31

## 2025-04-27 ASSESSMENT — PAIN - FUNCTIONAL ASSESSMENT
PAIN_FUNCTIONAL_ASSESSMENT: 0-10
PAIN_FUNCTIONAL_ASSESSMENT: 0-10

## 2025-04-27 ASSESSMENT — PAIN SCALES - GENERAL
PAINLEVEL_OUTOF10: 10
PAINLEVEL_OUTOF10: 8

## 2025-04-27 ASSESSMENT — PAIN DESCRIPTION - LOCATION: LOCATION: HEAD

## 2025-04-27 NOTE — DISCHARGE INSTRUCTIONS
Thank you for choosing our Emergency Department for your care.  It is our privilege to care for you in your time of need.  In the next several days, you may receive a survey via email or mailed to your home about your experience with our team.  We would greatly appreciate you taking a few minutes to complete the survey, as we use this information to learn what we have done well and what we could be doing better. Thank you for trusting us with your care!    Below you will find a list of your tests from today's visit.   Labs and Radiology Studies  Recent Results (from the past 12 hours)   CBC with Auto Differential    Collection Time: 04/27/25 12:10 PM   Result Value Ref Range    WBC 6.4 4.1 - 11.1 K/uL    RBC 4.40 4.10 - 5.70 M/uL    Hemoglobin 14.1 12.1 - 17.0 g/dL    Hematocrit 43.0 36.6 - 50.3 %    MCV 97.7 80.0 - 99.0 FL    MCH 32.0 26.0 - 34.0 PG    MCHC 32.8 30.0 - 36.5 g/dL    RDW 12.5 11.5 - 14.5 %    Platelets 179 150 - 400 K/uL    MPV 9.8 8.9 - 12.9 FL    Nucleated RBCs 0.0 0.0  WBC    nRBC 0.00 0.00 - 0.01 K/uL    Neutrophils % 66.3 32.0 - 75.0 %    Lymphocytes % 23.1 12.0 - 49.0 %    Monocytes % 8.0 5.0 - 13.0 %    Eosinophils % 2.0 0.0 - 7.0 %    Basophils % 0.3 0.0 - 1.0 %    Immature Granulocytes % 0.3 0 - 0.5 %    Neutrophils Absolute 4.21 1.80 - 8.00 K/UL    Lymphocytes Absolute 1.47 0.80 - 3.50 K/UL    Monocytes Absolute 0.51 0.00 - 1.00 K/UL    Eosinophils Absolute 0.13 0.00 - 0.40 K/UL    Basophils Absolute 0.02 0.00 - 0.10 K/UL    Immature Granulocytes Absolute 0.02 0.00 - 0.04 K/UL    Differential Type AUTOMATED     Comprehensive Metabolic Panel    Collection Time: 04/27/25 12:10 PM   Result Value Ref Range    Sodium 143 136 - 145 mmol/L    Potassium 3.8 3.5 - 5.1 mmol/L    Chloride 110 (H) 97 - 108 mmol/L    CO2 28 21 - 32 mmol/L    Anion Gap 5 2 - 12 mmol/L    Glucose 89 65 - 100 mg/dL    BUN 18 6 - 20 mg/dL    Creatinine 0.97 0.70 - 1.30 mg/dL    BUN/Creatinine Ratio 19 12 - 20       Est, Glom Filt Rate 86 >60 ml/min/1.73m2    Calcium 8.6 8.5 - 10.1 mg/dL    Total Bilirubin 0.5 0.2 - 1.0 mg/dL    AST 21 15 - 37 U/L    ALT 23 12 - 78 U/L    Alk Phosphatase 132 (H) 45 - 117 U/L    Total Protein 7.0 6.4 - 8.2 g/dL    Albumin 3.7 3.5 - 5.0 g/dL    Globulin 3.3 2.0 - 4.0 g/dL    Albumin/Globulin Ratio 1.1 1.1 - 2.2       CT Head W/O Contrast  Result Date: 4/27/2025  EXAM:  CT CERVICAL SPINE WO CONTRAST, CT HEAD WO CONTRAST INDICATION:   fall COMPARISON: MRI brain 6/30/2022, cervical spine radiograph 9/29/2021. TECHNIQUE: Unenhanced CT of the head was performed using 5 mm images. Brain and bone windows were generated.  CT dose reduction was achieved through use of a standardized protocol tailored for this examination and automatic exposure control for dose modulation. Unenhanced multislice helical CT of the cervical spine was performed in the axial plane. Coronal and sagittal reconstructions were obtained.  CT dose reduction was achieved through use of a standardized protocol tailored for this examination and automatic exposure control for dose modulation. FINDINGS: CT Head: The ventricles are normal in size and position. Unchanged scattered periventricular and deep white matter hypodensities, consistent with mild chronic microangiopathic ischemic changes. Stable small area of encephalomalacia in the right lateral temporal lobe. Basilar cisterns are patent. No midline shift. There is no evidence of acute infarct, hemorrhage, or extraaxial fluid collection. The paranasal sinuses, mastoid air cells, and middle ears are clear. The orbital contents are within normal limits. Moderate sized left parietal scalp hematoma and overlying laceration. CT Cervical Spine: Normal alignment. Vertebral body heights are preserved without evidence of an acute fracture. Mild to moderate degenerative disc disease in the mid and lower cervical spine. There is no more than mild spinal canal stenosis at any level.

## 2025-04-27 NOTE — ED PROVIDER NOTES
[facility] EMERGENCY DEPT  EMERGENCY DEPARTMENT HISTORY AND PHYSICAL EXAM      Date of evaluation: 2025  Patient Name: Hira Cespedes Jr.  Birthdate 1955  MRN: [id number]  ED Provider: Carter Michael MD   Note Started: 2:01 PM EDT 25    HISTORY OF PRESENT ILLNESS     Chief Complaint   Patient presents with    Fall       History Provided By: Patient, EMS     HPI: Hira Cespedes Jr. is a 69 y.o. male with past medical history as reviewed below presents for evaluation of laceration.  Patient tripped, falling and hit the back of his head.  He does not believe he passed out.  He is on blood thinners.  No other injuries from the fall    PAST MEDICAL HISTORY   Past Medical History:  Past Medical History:   Diagnosis Date    Chronic bronchitis (HCC)     H/O seasonal allergies     Hypertension        Past Surgical History:  No past surgical history on file.    Family History:  No family history on file.    Social History:  Social History     Tobacco Use    Smoking status: Every Day     Current packs/day: 0.00     Average packs/day: 1 pack/day for 40.0 years (40.0 ttl pk-yrs)     Types: Cigarettes     Start date: 10/6/1978     Last attempt to quit: 10/6/2018     Years since quittin.5    Smokeless tobacco: Never   Substance Use Topics    Alcohol use: No    Drug use: No       Allergies:  No Known Allergies    PCP: Jonnie Merlos MD    Current Meds:   No current facility-administered medications for this encounter.     Current Outpatient Medications   Medication Sig Dispense Refill    carBAMazepine (TEGRETOL) 100 MG chewable tablet CHEW AND SWALLOW 1 TABLET BY MOUTH 3 TIMES DAILY 90 tablet 3    losartan (COZAAR) 100 MG tablet TAKE ONE TABLET BY MOUTH EVERY DAY 90 tablet 1    omeprazole (PRILOSEC) 40 MG delayed release capsule TAKE ONE CAPSULE BY MOUTH EVERY DAY 90 capsule 1    metoprolol tartrate (LOPRESSOR) 25 MG tablet TAKE ONE TABLET BY MOUTH EVERY DAY 90 tablet 1    pravastatin (PRAVACHOL) 10 MG tablet TAKE

## 2025-04-27 NOTE — ED TRIAGE NOTES
GLF this morning, pt tripped. No LOC, on thinners. 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 About I\" lac to the back of the head, bleeding controlled. Pt ambulatory when EMS arrives, a&ox4. No other complaints.

## 2025-04-28 LAB
EKG ATRIAL RATE: 62 BPM
EKG DIAGNOSIS: NORMAL
EKG P AXIS: 65 DEGREES
EKG P-R INTERVAL: 154 MS
EKG Q-T INTERVAL: 422 MS
EKG QRS DURATION: 76 MS
EKG QTC CALCULATION (BAZETT): 428 MS
EKG R AXIS: 37 DEGREES
EKG T AXIS: 34 DEGREES
EKG VENTRICULAR RATE: 62 BPM

## 2025-04-28 PROCEDURE — 93010 ELECTROCARDIOGRAM REPORT: CPT | Performed by: SPECIALIST

## 2025-04-28 NOTE — PATIENT INSTRUCTIONS
Nausea and Vomiting: Care Instructions Your Care Instructions When you are nauseated, you may feel weak and sweaty and notice a lot of saliva in your mouth. Nausea often leads to vomiting. Most of the time you do not need to worry about nausea and vomiting, but they can be signs of other illnesses. Two common causes of nausea and vomiting are stomach flu and food poisoning. Nausea and vomiting from viral stomach flu will usually start to improve within 24 hours. Nausea and vomiting from food poisoning may last from 12 to 48 hours. The doctor has checked you carefully, but problems can develop later. If you notice any problems or new symptoms, get medical treatment right away. Follow-up care is a key part of your treatment and safety. Be sure to make and go to all appointments, and call your doctor if you are having problems. It's also a good idea to know your test results and keep a list of the medicines you take. How can you care for yourself at home? · To prevent dehydration, drink plenty of fluids, enough so that your urine is light yellow or clear like water. Choose water and other caffeine-free clear liquids until you feel better. If you have kidney, heart, or liver disease and have to limit fluids, talk with your doctor before you increase the amount of fluids you drink. · Rest in bed until you feel better. · When you are able to eat, try clear soups, mild foods, and liquids until all symptoms are gone for 12 to 48 hours. Other good choices include dry toast, crackers, cooked cereal, and gelatin dessert, such as Jell-O. When should you call for help? Call 911 anytime you think you may need emergency care. For example, call if: 
  · You passed out (lost consciousness).  
 Call your doctor now or seek immediate medical care if: 
  · You have symptoms of dehydration, such as: 
? Dry eyes and a dry mouth. ? Passing only a little dark urine. ?  Feeling thirstier than usual.  
 What Is The Reason For Today's Visit?: Full Body Skin Examination What Is The Reason For Today's Visit? (Being Monitored For X): concerning skin lesions on a periodic basis   · You have new or worsening belly pain.  
  · You have a new or higher fever.  
  · You vomit blood or what looks like coffee grounds.  
 Watch closely for changes in your health, and be sure to contact your doctor if: 
  · You have ongoing nausea and vomiting.  
  · Your vomiting is getting worse.  
  · Your vomiting lasts longer than 2 days.  
  · You are not getting better as expected. Where can you learn more? Go to http://jonatan-donna.info/. Enter 25 [id number] in the search box to learn more about \"Nausea and Vomiting: Care Instructions. \" Current as of: September 23, 2018 Content Version: 11.9 © 6410-1116 [employer]. Care instructions adapted under license by [employer] (which disclaims liability or warranty for this information). If you have questions about a medical condition or this instruction, always ask your healthcare professional. [address] any warranty or liability for your use of this information.

## 2025-05-09 ENCOUNTER — OFFICE VISIT (OUTPATIENT)
Facility: CLINIC | Age: 70
End: 2025-05-09
Payer: MEDICARE

## 2025-05-09 VITALS
SYSTOLIC BLOOD PRESSURE: 153 MMHG | RESPIRATION RATE: 17 BRPM | WEIGHT: 198 LBS | HEART RATE: 60 BPM | TEMPERATURE: 98.6 F | HEIGHT: 68 IN | OXYGEN SATURATION: 98 % | DIASTOLIC BLOOD PRESSURE: 76 MMHG | BODY MASS INDEX: 30.01 KG/M2

## 2025-05-09 DIAGNOSIS — I10 ESSENTIAL (PRIMARY) HYPERTENSION: ICD-10-CM

## 2025-05-09 DIAGNOSIS — J44.9 CHRONIC OBSTRUCTIVE PULMONARY DISEASE, UNSPECIFIED COPD TYPE (HCC): ICD-10-CM

## 2025-05-09 DIAGNOSIS — S01.91XD: Primary | ICD-10-CM

## 2025-05-09 DIAGNOSIS — R56.1 POST-TRAUMATIC SEIZURES (HCC): ICD-10-CM
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
## 2025-05-09 DIAGNOSIS — Z48.02 ENCOUNTER FOR STAPLE REMOVAL: ICD-10-CM

## 2025-05-09 PROCEDURE — 1159F MED LIST DOCD IN RCRD: CPT | Performed by: FAMILY MEDICINE

## 2025-05-09 PROCEDURE — 99214 OFFICE O/P EST MOD 30 MIN: CPT | Performed by: FAMILY MEDICINE

## 2025-05-09 PROCEDURE — 3017F COLORECTAL CA SCREEN DOC REV: CPT | Performed by: FAMILY MEDICINE

## 2025-05-09 PROCEDURE — 4004F PT TOBACCO SCREEN RCVD TLK: CPT | Performed by: FAMILY MEDICINE

## 2025-05-09 PROCEDURE — 3023F SPIROM DOC REV: CPT | Performed by: FAMILY MEDICINE

## 2025-05-09 PROCEDURE — 3078F DIAST BP <80 MM HG: CPT | Performed by: FAMILY MEDICINE

## 2025-05-09 PROCEDURE — 3077F SYST BP >= 140 MM HG: CPT | Performed by: FAMILY MEDICINE

## 2025-05-09 PROCEDURE — 1160F RVW MEDS BY RX/DR IN RCRD: CPT | Performed by: FAMILY MEDICINE

## 2025-05-09 PROCEDURE — G8417 CALC BMI ABV UP PARAM F/U: HCPCS | Performed by: FAMILY MEDICINE

## 2025-05-09 PROCEDURE — G8427 DOCREV CUR MEDS BY ELIG CLIN: HCPCS | Performed by: FAMILY MEDICINE

## 2025-05-09 PROCEDURE — 1123F ACP DISCUSS/DSCN MKR DOCD: CPT | Performed by: FAMILY MEDICINE

## 2025-05-09 PROCEDURE — G2211 COMPLEX E/M VISIT ADD ON: HCPCS | Performed by: FAMILY MEDICINE

## 2025-05-09 SDOH — ECONOMIC STABILITY: FOOD INSECURITY: WITHIN THE PAST 12 MONTHS, YOU WORRIED THAT YOUR FOOD WOULD RUN OUT BEFORE YOU GOT MONEY TO BUY MORE.: NEVER TRUE

## 2025-05-09 SDOH — ECONOMIC STABILITY: FOOD INSECURITY: WITHIN THE PAST 12 MONTHS, THE FOOD YOU BOUGHT JUST DIDN'T LAST AND YOU DIDN'T HAVE MONEY TO GET MORE.: NEVER TRUE

## 2025-05-09 ASSESSMENT — PATIENT HEALTH QUESTIONNAIRE - PHQ9
4. FEELING TIRED OR HAVING LITTLE ENERGY: NOT AT ALL
SUM OF ALL RESPONSES TO PHQ QUESTIONS 1-9: 0
3. TROUBLE FALLING OR STAYING ASLEEP: NOT AT ALL
1. LITTLE INTEREST OR PLEASURE IN DOING THINGS: NOT AT ALL
8. MOVING OR SPEAKING SO SLOWLY THAT OTHER PEOPLE COULD HAVE NOTICED. OR THE OPPOSITE, BEING SO FIGETY OR RESTLESS THAT YOU HAVE BEEN MOVING AROUND A LOT MORE THAN USUAL: NOT AT ALL
SUM OF ALL RESPONSES TO PHQ QUESTIONS 1-9: 0
9. THOUGHTS THAT YOU WOULD BE BETTER OFF DEAD, OR OF HURTING YOURSELF: NOT AT ALL
5. POOR APPETITE OR OVEREATING: NOT AT ALL
6. FEELING BAD ABOUT YOURSELF - OR THAT YOU ARE A FAILURE OR HAVE LET YOURSELF OR YOUR FAMILY DOWN: NOT AT ALL
7. TROUBLE CONCENTRATING ON THINGS, SUCH AS READING THE NEWSPAPER OR WATCHING TELEVISION: NOT AT ALL
2. FEELING DOWN, DEPRESSED OR HOPELESS: NOT AT ALL
SUM OF ALL RESPONSES TO PHQ QUESTIONS 1-9: 0
10. IF YOU CHECKED OFF ANY PROBLEMS, HOW DIFFICULT HAVE THESE PROBLEMS MADE IT FOR YOU TO DO YOUR WORK, TAKE CARE OF THINGS AT HOME, OR GET ALONG WITH OTHER PEOPLE: NOT DIFFICULT AT ALL
SUM OF ALL RESPONSES TO PHQ QUESTIONS 1-9: 0

## 2025-05-09 NOTE — PROGRESS NOTES
\"Have you been to the ER, urgent care clinic since your last visit?  Hospitalized since your last visit?\"    Yes     “Have you seen or consulted any other health care providers outside of [facility] since your last visit?”    NO        “Have you had a colorectal cancer screening such as a colonoscopy/FIT/Cologuard?    NO    No colonoscopy on file  No cologuard on file  No FIT/FOBT on file   No flexible sigmoidoscopy on file         Click Here for Release of Records Request     Health Maintenance Due   Topic Date Due    Colorectal Cancer Screen  Never done    Lung Cancer Screening &/or Counseling  Never done    Respiratory Syncytial Virus (RSV) Pregnant or age 60 yrs+ (1 - Risk 60-74 years 1-dose series) Never done    Pneumococcal 50+ years Vaccine (2 of 2 - PCV) 10/26/2018    Shingles vaccine (3 of 3) 03/19/2019    AAA screen  Never done    COVID-19 Vaccine (8 - 2024-25 season) 05/01/2025

## 2025-05-16 NOTE — PROGRESS NOTES
Progress Note    Patient: Hira Cespedes Jr. MRN: [id number]  SSN: [id number]    YOB: 1955  Age: 69 y.o.  Sex: male        Chief Complaint   Patient presents with    6 Month Follow-Up     he is a 69 y.o. year old male who presents for follow up of his chronic health conditions. He has had a recent fall with a head laceration. He was evaluated in the ED and needs staples removed. Patient with hx of skull fracture with resultant depression, neurogenic pain, seizures and hearing loss. He says his mood is stable with current dose of fluoxetine. No recent seizures. Patient denies HA, CP, dysuria, acute weakness or paresthesia. He continues to smoke.    Encounter Diagnoses   Name Primary?    Laceration of head without complication, subsequent encounter Yes    Encounter for staple removal     Fall, subsequent encounter     Post-traumatic seizures (HCC)     Essential (primary) hypertension     Chronic obstructive pulmonary disease, unspecified COPD type (HCC)      BP Readings from Last 3 Encounters:   05/09/25 (!) 153/76   04/27/25 (!) 169/79   11/08/24 (!) 161/105     Wt Readings from Last 3 Encounters:   05/09/25 89.8 kg (198 lb)   04/27/25 90.7 kg (200 lb)   11/08/24 90.6 kg (199 lb 12.8 oz)     Body mass index is 30.11 kg/m².    Patient Active Problem List   Diagnosis    Neurogenic pain    Non-compliance    HTN (hypertension)    Chronic rhinitis    Recurrent depression    Tinnitus    Tobacco abuse    Hearing loss of both ears    Chronic SI joint pain    Hand pain    Depression due to head injury    Allergic arthritis involving hand    Back pain    Chest pain, unspecified    Simple chronic bronchitis (HCC)    Hyperlipidemia    Mild cognitive impairment    Convulsions, unspecified convulsion type (HCC)     History reviewed. No pertinent surgical history.  Social History     Socioeconomic History    Marital status: Single     Spouse name: Not on file    Number of children: Not on file    Years of education:

## 2025-05-22 RX ORDER — BUSPIRONE HYDROCHLORIDE 7.5 MG/1
7.5 TABLET ORAL 2 TIMES DAILY
Qty: 180 TABLET | Refills: 1 | Status: SHIPPED | OUTPATIENT
Start: 2025-05-22

## 2025-06-19 RX ORDER — CARBAMAZEPINE 100 MG/1
TABLET, CHEWABLE ORAL
Qty: 90 TABLET | Refills: 1 | Status: SHIPPED | OUTPATIENT
Start: 2025-06-19

## 2025-07-15 RX ORDER — FLUOXETINE HYDROCHLORIDE 40 MG/1
40 CAPSULE ORAL DAILY
Qty: 90 CAPSULE | Refills: 1 | Status: SHIPPED | OUTPATIENT
Start: 2025-07-15

## 2025-08-02 DIAGNOSIS — I10 ESSENTIAL (PRIMARY) HYPERTENSION: ICD-10-CM

## 2025-08-02 DIAGNOSIS — K21.9 GASTRO-ESOPHAGEAL REFLUX DISEASE WITHOUT ESOPHAGITIS: ICD-10-CM

## 2025-08-04 RX ORDER — METOPROLOL TARTRATE 25 MG/1
25 TABLET, FILM COATED ORAL DAILY
Qty: 90 TABLET | Refills: 1 | Status: SHIPPED | OUTPATIENT
Start: 2025-08-04

## 2025-08-04 RX ORDER — LOSARTAN POTASSIUM 100 MG/1
100 TABLET ORAL DAILY
Qty: 90 TABLET | Refills: 1 | Status: SHIPPED | OUTPATIENT
Start: 2025-08-04

## 2025-08-04 RX ORDER — PRAVASTATIN SODIUM 10 MG
10 TABLET ORAL NIGHTLY
Qty: 90 TABLET | Refills: 1 | Status: SHIPPED | OUTPATIENT
Start: 2025-08-04

## 2025-08-04 RX ORDER — OMEPRAZOLE 40 MG/1
40 CAPSULE, DELAYED RELEASE ORAL DAILY
Qty: 90 CAPSULE | Refills: 1 | Status: SHIPPED | OUTPATIENT
Start: 2025-08-04

## 2025-08-25 RX ORDER — CARBAMAZEPINE 100 MG/1
TABLET, CHEWABLE ORAL
Qty: 90 TABLET | Refills: 1 | Status: SHIPPED | OUTPATIENT
Start: 2025-08-25